# Patient Record
Sex: FEMALE | Race: WHITE | Employment: OTHER | URBAN - METROPOLITAN AREA
[De-identification: names, ages, dates, MRNs, and addresses within clinical notes are randomized per-mention and may not be internally consistent; named-entity substitution may affect disease eponyms.]

---

## 2017-01-18 RX ORDER — TOLTERODINE TARTRATE 2 MG/1
2 TABLET, EXTENDED RELEASE ORAL 2 TIMES DAILY
Qty: 180 TAB | Refills: 3 | Status: SHIPPED | OUTPATIENT
Start: 2017-01-18 | End: 2018-01-15 | Stop reason: SDUPTHER

## 2017-02-28 ENCOUNTER — OFFICE VISIT (OUTPATIENT)
Dept: CARDIOLOGY CLINIC | Age: 82
End: 2017-02-28

## 2017-02-28 ENCOUNTER — HOSPITAL ENCOUNTER (OUTPATIENT)
Dept: LAB | Age: 82
Discharge: HOME | End: 2017-02-28
Payer: MEDICARE

## 2017-02-28 VITALS
HEIGHT: 62 IN | OXYGEN SATURATION: 97 % | DIASTOLIC BLOOD PRESSURE: 70 MMHG | HEART RATE: 90 BPM | BODY MASS INDEX: 23 KG/M2 | WEIGHT: 125 LBS | SYSTOLIC BLOOD PRESSURE: 130 MMHG

## 2017-02-28 DIAGNOSIS — I25.10 ATHEROSCLEROSIS OF NATIVE CORONARY ARTERY OF NATIVE HEART WITHOUT ANGINA PECTORIS: Chronic | ICD-10-CM

## 2017-02-28 DIAGNOSIS — R00.2 PALPITATIONS: ICD-10-CM

## 2017-02-28 DIAGNOSIS — I35.0 MILD AORTIC STENOSIS: Chronic | ICD-10-CM

## 2017-02-28 DIAGNOSIS — I10 ESSENTIAL HYPERTENSION: Chronic | ICD-10-CM

## 2017-02-28 DIAGNOSIS — R07.9 CHEST PAIN, UNSPECIFIED TYPE: ICD-10-CM

## 2017-02-28 DIAGNOSIS — I48.0 PAF (PAROXYSMAL ATRIAL FIBRILLATION) (HCC): Primary | ICD-10-CM

## 2017-02-28 LAB
ANION GAP BLD CALC-SCNC: 5 MMOL/L (ref 3–18)
BASOPHILS # BLD AUTO: 0.1 K/UL (ref 0–0.1)
BASOPHILS # BLD: 1 % (ref 0–2)
BUN SERPL-MCNC: 36 MG/DL (ref 7–18)
BUN/CREAT SERPL: 44 (ref 12–20)
CALCIUM SERPL-MCNC: 8.9 MG/DL (ref 8.5–10.1)
CHLORIDE SERPL-SCNC: 107 MMOL/L (ref 100–108)
CO2 SERPL-SCNC: 29 MMOL/L (ref 21–32)
CREAT SERPL-MCNC: 0.81 MG/DL (ref 0.6–1.3)
DIFFERENTIAL METHOD BLD: ABNORMAL
EOSINOPHIL # BLD: 0.2 K/UL (ref 0–0.4)
EOSINOPHIL NFR BLD: 3 % (ref 0–5)
ERYTHROCYTE [DISTWIDTH] IN BLOOD BY AUTOMATED COUNT: 13.4 % (ref 11.6–14.5)
GLUCOSE SERPL-MCNC: 117 MG/DL (ref 74–99)
HCT VFR BLD AUTO: 34.5 % (ref 35–45)
HGB BLD-MCNC: 11.1 G/DL (ref 12–16)
LYMPHOCYTES # BLD AUTO: 25 % (ref 21–52)
LYMPHOCYTES # BLD: 1.4 K/UL (ref 0.9–3.6)
MCH RBC QN AUTO: 31.3 PG (ref 24–34)
MCHC RBC AUTO-ENTMCNC: 32.2 G/DL (ref 31–37)
MCV RBC AUTO: 97.2 FL (ref 74–97)
MONOCYTES # BLD: 0.5 K/UL (ref 0.05–1.2)
MONOCYTES NFR BLD AUTO: 9 % (ref 3–10)
NEUTS SEG # BLD: 3.4 K/UL (ref 1.8–8)
NEUTS SEG NFR BLD AUTO: 62 % (ref 40–73)
PLATELET # BLD AUTO: 167 K/UL (ref 135–420)
PMV BLD AUTO: 10.6 FL (ref 9.2–11.8)
POTASSIUM SERPL-SCNC: 4.3 MMOL/L (ref 3.5–5.5)
RBC # BLD AUTO: 3.55 M/UL (ref 4.2–5.3)
SODIUM SERPL-SCNC: 141 MMOL/L (ref 136–145)
WBC # BLD AUTO: 5.4 K/UL (ref 4.6–13.2)

## 2017-02-28 PROCEDURE — 36415 COLL VENOUS BLD VENIPUNCTURE: CPT | Performed by: INTERNAL MEDICINE

## 2017-02-28 PROCEDURE — 85025 COMPLETE CBC W/AUTO DIFF WBC: CPT | Performed by: INTERNAL MEDICINE

## 2017-02-28 PROCEDURE — 80048 BASIC METABOLIC PNL TOTAL CA: CPT | Performed by: INTERNAL MEDICINE

## 2017-02-28 NOTE — PROGRESS NOTES
HISTORY OF PRESENT ILLNESS  Rashida Dale is a 80 y.o. female. HPI  She came to the office with multiple issues. Approximately four weeks ago, she had sudden onset of chest tightness along with bilateral arm pain and jaw pain. However, her jaw pain and arm pain felt as if she had tingling rather than tightness. But, her chest felt very heavy. She was also short of breath. She has not had any recurrence of chest pain or jaw pain since then, but she has been short of breath with the slightest amount of physical activity. She has had no resting dyspnea, orthopnea or PND. She has been feeling palpitations lately and felt as if her heartbeat was wrong, but she could not pinpoint. She denies dizziness or syncope. She does have issues with balance and she walks with a cane. She has had no symptoms to indicate TIA or amaurosis fugax. She has a history of successful PTCA of the LAD in March 1995 and has had no recurrent angina ever since. She had a repeat cardiac catheterization in March 1995 for chest pain, which revealed no evidence of restenosis . Her noninvasive carotid studies on March 19, 2007, demonstrated 50% stenosis of the distal right internal carotid artery, which was not severe enough to warrant surgical intervention. She underwent stress nuclear cardiac imaging on May 11, 2009, which was a normal imaging, with no evidence of scarring or ischemia and no interval change in comparison to the study of March 2007.    Because of the continued chest pain despite the negative stress and EKG and cardiac imaging, she underwent the cardiac catheterization on 12/15/2010, which was reviewed.    1. Patient dominant RCA with the 30% of proximal stenosis. 2. Patent left main trunk. .  3. Patient LAD with the diffuse 20 to 30 % narrowing throughout. 4. Patient circumflex artery with diffuse 30% stenosis in the mid segment.   5. Normal left ventricular systolic function with EF in the 60% range.  6. Mild aortic stenosis with the pressure gradient of 10 to 20 mmHg on pullback. It was felt that her chest pain was noncardiac in nature, and the continued medical treatment was recommended.    She was admitted to [facility] on 12/4/11 with hemoptysis. She was evaluated by endoscopy examination and found to have a hiatal hernia but no source of bleeding. She was subsequently evaluated by pulmonology and was found to have obstructive lesion of left upper lobe. A bronchoscopy was done on 12/5/11 which demonstrated obstructive upper left lobe with hemorrhage of entire bronchial tree suggestive of hemoptysis rather than hematemesis from the GI system. 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 She has been under the care of Dr. Mariam Perez for hemoptysis and abnormal CT scan and chest X-ray but no decision has been made thus far. She underwent endoscopic examination on 10/21/13 and was found to have gastric ulcers, which were treated with Nexium. She is now just taking Prilosec occasionally. She has had stress nuclear cardiac imaging on 2/10/14, which demonstrated normal perfusion with no evidence of scarring or ischemia. The ejection fraction was in the 80% range. She underwent noninvasive carotid study to evaluate asymptomatic carotid bruit on 2/10/14, which demonstrated bilateral 1-49% stenosis of the internal carotid arteries, which was felt to be not severe enough to cause any symptoms or warrant treatment.    She continues to be followed by a pulmonologist for some mass like lesion in the lungs by chest X-ray and CT scan, but it has been negative for malignancy thus far. She has had occasional hemoptysis and it is not clear if it ever has been related to the lung lesions.  She is now under the care of Dr. Sylvia Cortes, as Dr. Mariam Perez has retired.    She underwent stress nuclear cardiac imaging on 02/22/16, which demonstrated a very small focal area of mild ischemia in the inferior apex. LV function was normal with EF in the 75% range. Review of Systems   Constitutional: Negative for malaise/fatigue and weight loss. HENT: Negative for hearing loss. Eyes: Negative for blurred vision and double vision. Respiratory: Positive for shortness of breath. Cardiovascular: Positive for chest pain and palpitations. Negative for orthopnea, claudication, leg swelling and PND. Gastrointestinal: Positive for heartburn. Negative for blood in stool and melena. Genitourinary: Negative for dysuria, frequency, hematuria and urgency. Musculoskeletal: Negative for back pain and joint pain. Skin: Negative for itching and rash. Neurological: Negative for dizziness, loss of consciousness, weakness and headaches. Psychiatric/Behavioral: Negative for depression and memory loss. Physical Exam   Constitutional: She is oriented to person, place, and time. She appears well-developed and well-nourished. HENT:   Head: Normocephalic and atraumatic. Eyes: Conjunctivae are normal. Pupils are equal, round, and reactive to light. Neck: Normal range of motion. Neck supple. No JVD present. Cardiovascular: Normal rate, S1 normal and S2 normal.  An irregularly irregular rhythm present. Extrasystoles are present. PMI is not displaced. Exam reveals no gallop and no friction rub. Murmur heard. Harsh early systolic murmur is present with a grade of 1/6  at the upper right sternal border radiating to the neck  Pulses:       Carotid pulses are 3+ on the right side with bruit, and 3+ on the left side with bruit. Pulmonary/Chest: Effort normal. She has no rales. Abdominal: Soft. There is no tenderness. Musculoskeletal: She exhibits no edema. Neurological: She is alert and oriented to person, place, and time. No cranial nerve deficit. Skin: Skin is warm and dry.    Psychiatric: She has a normal mood and affect. Her behavior is normal.     Visit Vitals    /70    Pulse 90    Ht 5' 2\" (1.575 m)    Wt 56.7 kg (125 lb)    SpO2 97%    BMI 22.86 kg/m2       Past Medical History:   Diagnosis Date    Allergic rhinitis     Anemia     Asymptomatic carotid bruit     asymptomatic    Cardiac cath 12/15/2010    pRCA 30%. LM patent. LAD 25%. CX 30%. LVEDP 10 mmHg. EF 60%. Mild AS.  Cardiac nuclear imaging test, low to mod risk 02/22/2016    Low to intermediate risk. Sm reversible inferoapical defect may be a sm area of ischemia. No prior infarction. EF >75%. Neg EKG on pharm stress test.    Carotid duplex 02/10/2014    Mild 1-49% bilateral ICA stenosis. 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 Marilyn Orlando Hypertension     Lung collapse Dec 2011    Collapse of left upper lobe    Mild aortic stenosis 8/22/2011    Osteoarthritis     Osteoporosis     Positive PPD        Social History     Social History    Marital status:      Spouse name: N/A    Number of children: N/A    Years of education: N/A     Occupational History    Not on file.      Social History Main Topics    Smoking status: Never Smoker    Smokeless tobacco: Never Used      Comment: extensive secondhand smoke exposure through job    Alcohol use No    Drug use: No    Sexual activity: No     Other Topics Concern    Not on file     Social History Narrative       Family History   Problem Relation Age of Onset    Heart Disease Mother      History of CHF    Heart Disease Father     Heart Attack Father     Heart Disease Sister     Cancer Brother      non-Hodgkin lymphoma    Cancer Brother      liver cancer    Heart Disease Brother     Stroke Brother     Parkinsonism Sister     Parkinsonism Brother        Past Surgical History:   Procedure Laterality Date    HX CATARACT REMOVAL      HX COLONOSCOPY      HX HEART CATHETERIZATION  12/15/10    negative    HX HYSTERECTOMY      HX KNEE ARTHROSCOPY      HX PTCA  3/1995    of the LAD; has had no recurrent angina since    HX TONSILLECTOMY      OH BRONCHOSCOPY BRONCHIAL/ENDOBRNCL BX 1+ SITES  9/25/2012            Current Outpatient Prescriptions   Medication Sig Dispense Refill    PSYLLIUM SEED, WITH DEXTROSE, (FIBER PO) Take  by mouth.  tolterodine (DETROL) 2 mg tablet Take 1 Tab by mouth two (2) times a day. 180 Tab 3    carvedilol (COREG) 12.5 mg tablet take 1 tablet by mouth twice a day 180 Tab 3    lovastatin (MEVACOR) 20 mg tablet Take 1 Tab by mouth daily. 90 Tab 3    trimethoprim-sulfamethoxazole (BACTRIM DS) 160-800 mg per tablet Take 1 Tab by mouth two (2) times a day. 14 Tab 0    amLODIPine (NORVASC) 10 mg tablet take 1 tablet by mouth daily 30 Tab 6    losartan (COZAAR) 50 mg tablet Take 1 Tab by mouth daily. 30 Tab 6    hydrochlorothiazide (MICROZIDE) 12.5 mg capsule Take 1 Cap by mouth daily. 90 Cap 3    docusate sodium (COLACE) 100 mg capsule One capsule by mouth twice daily 60 Cap 3    omeprazole (PRILOSEC) 20 mg capsule Take 20 mg by mouth daily as needed.  naproxen sodium (ALEVE) 220 mg cap Take  by mouth.  lactobacillus acidophilus (BACID) cap Take 2 capsules by mouth two (2) times a day.  ascorbic acid (VITAMIN C) 250 mg tablet Take 1 Tab by mouth two (2) times daily (with meals).  [Request refills from PCP ( Barry Chapman)] 30 Tab 0    cholecalciferol (VITAMIN D3) 1,000 unit tablet Take 2 Tabs by mouth daily. [Request refills from PCP (Dr. Aidan Chapman)] 30 Tab 0    aspirin 81 mg tablet Take 81 mg by mouth daily.  MULTIVITAMIN PO Take 1 Tab by mouth daily.  DOCOSAHEXANOIC ACID/EPA (FISH OIL PO) Take 3 Tabs by mouth daily. EKG: atrial fibrillation, rate controlled, occasional PVC noted, unifocal, AF new since 8/29/16  . ASSESSMENT and PLAN  Encounter Diagnoses   Name Primary?  PAF (paroxysmal atrial fibrillation) ([facility]) Yes    Coronary artery disease, status post PTCA of LAD in March 1995/EF 70%.  Mild aortic stenosis     Essential hypertension     Palpitations     Chest pain, unspecified type    Her chest pain was very suggestive of angina with heaviness in the center of the chest, jaw pain and bilateral arm pain, but was atypical in that it was nonexertional and has not been recurrent in the past four weeks. The  duration was twenty to twenty-five minutes that is atypical for angina. However, she has been experiencing dyspnea on exertion. She has developed new onset atrial fibrillation with some palpitations. Her dyspnea on exertion could be secondary to left ventricular diastolic dysfunction with superimposed atrial fibrillation. We discussed the atrial fibrillation and she was advised that atrial fibrillation is not a life threatening arrhythmia, but there are issues with heart rate control and stroke prevention. I would obtain a Holter monitor to determine the heart rate control. She is somewhat concerned about the anticoagulation because of her advanced age and balance issues, which I agreed with. I would like to evaluate her coronary artery status thoroughly before considering anticoagulation in case she would require invasive treatment.

## 2017-02-28 NOTE — MR AVS SNAPSHOT
Visit Information Date & Time Provider Department Dept. Phone Encounter #  
 2/28/2017 10:40 AM Dylan Painting MD [facility] [phone] [id number] Your Appointments 4/25/2017  9:25 AM  
LAB with IOC NURSE VISIT Internist of [facility] ([address]) Appt Note: OV lab  
 [address], [address] [facility] [address] [place]  
  
   
 [address], [address]  
  
    
 5/2/2017 10:00 AM  
Office Visit with Janey Cortez MD  
Internist of [address] [place] [address] [address] [facility]) Appt Note: 4 mth f/u  
 [address] [facility], [address] [facility] [address] [place]  
  
   
 [address], [address] Upcoming Health Maintenance Date Due DTaP/Tdap/Td series (1 - Tdap) 12/29/1949 Pneumococcal 65+ High/Highest Risk (2 of 2 - PPSV23) 6/16/2016 GLAUCOMA SCREENING Q2Y 1/5/2017 MEDICARE YEARLY EXAM 4/22/2017 Allergies as of 2/28/2017  Review Complete On: 2/28/2017 By: Dylan Painting MD  
  
 Severity Noted Reaction Type Reactions Bactrim [Sulfamethoprim Ds] High 02/11/2013   Systemic Nausea and Vomiting Vicodin [Hydrocodone-acetaminophen] Medium 10/03/2012   Side Effect Nausea Only Atenolol    Other (comments)  
 interolance Codeine    Other (comments)  
 headache Erythromycin    Unknown (comments) Other Medication    Not Reported This Time Refined sugar Pcn [Penicillins]    Hives Current Immunizations  Reviewed on 8/26/2016 Name Date Influenza High Dose Vaccine PF 8/26/2016 10:11 AM, 10/12/2015 11:23 AM, 9/10/2014  3:33 PM  
 Influenza Vaccine 10/24/2013 Influenza Vaccine Split 10/28/2011 Influenza Vaccine Whole 10/25/2010 Pneumococcal Conjugate (PCV-13) 4/21/2016 10:28 AM  
 TB Skin Test (PPD) 1/1/1992 Not reviewed this visit You Were Diagnosed With   
  
 Codes Comments PAF (paroxysmal atrial fibrillation) ([facility]    -  Primary ICD-10-CM: I48.0 ICD-9-CM: 427.31 Atherosclerosis of native coronary artery of native heart without angina pectoris     ICD-10-CM: I25.10 ICD-9-CM: 414.01 Mild aortic stenosis     ICD-10-CM: I35.0 ICD-9-CM: 424.1 Essential hypertension     ICD-10-CM: I10 
ICD-9-CM: 401.9 Palpitations     ICD-10-CM: R00.2 ICD-9-CM: 785.1 Chest pain, unspecified type     ICD-10-CM: R07.9 ICD-9-CM: 786.50 Vitals BP  
  
  
  
  
  
 130/70 Vitals History BMI and BSA Data Body Mass Index Body Surface Area  
 22.86 kg/m 2 1.57 m 2 Preferred Pharmacy Pharmacy Name Phone [facility] [address] [facility], [address] [place] [address] [phone] Your Updated Medication List  
  
   
This list is accurate as of: 2/28/17 11:59 AM.  Always use your most recent med list.  
  
  
  
  
 ALEVE 220 mg Cap Generic drug:  naproxen sodium Take  by mouth. amLODIPine 10 mg tablet Commonly known as:  NORVASC  
take 1 tablet by mouth daily  
  
 ascorbic acid (vitamin C) 250 mg tablet Commonly known as:  VITAMIN C Take 1 Tab by mouth two (2) times daily (with meals). [Request refills from PCP (Dr. Katrin Chapman)] aspirin 81 mg tablet Take 81 mg by mouth daily. carvedilol 12.5 mg tablet Commonly known as:  COREG  
take 1 tablet by mouth twice a day  
  
 cholecalciferol 1,000 unit tablet Commonly known as:  VITAMIN D3 Take 2 Tabs by mouth daily. [Request refills from PCP (Dr. Katrin Chapman)] docusate sodium 100 mg capsule Commonly known as:  Lucetta Oviedo One capsule by mouth twice daily FIBER PO Take  by mouth. FISH OIL PO Take 3 Tabs by mouth daily. hydroCHLOROthiazide 12.5 mg capsule Commonly known as:  Dalton Bue Take 1 Cap by mouth daily. Lactobacillus acidophilus Cap Commonly known as:  BACID  
 Take 2 capsules by mouth two (2) times a day. losartan 50 mg tablet Commonly known as:  COZAAR Take 1 Tab by mouth daily. lovastatin 20 mg tablet Commonly known as:  MEVACOR Take 1 Tab by mouth daily. MULTIVITAMIN PO Take 1 Tab by mouth daily. PriLOSEC 20 mg capsule Generic drug:  omeprazole Take 20 mg by mouth daily as needed. tolterodine 2 mg tablet Commonly known as:  Yared Knuckles Take 1 Tab by mouth two (2) times a day. trimethoprim-sulfamethoxazole 160-800 mg per tablet Commonly known as:  BACTRIM DS Take 1 Tab by mouth two (2) times a day. We Performed the Following AMB POC EKG ROUTINE W/ 12 LEADS, INTER & REP [22221 CPT(R)] To-Do List   
 02/28/2017 ECG:  CARDIAC SPECT REST AND STRESS   
  
 02/28/2017 Lab:  CBC WITH AUTOMATED DIFF   
  
 02/28/2017 ECG:  ECG HOLTER MONITOR, UP TO 48 HRS   
  
 02/28/2017 Lab:  METABOLIC PANEL, BASIC   
  
 02/28/2017 ECG:  NUCLEAR STRESS TEST   
  
 03/02/2017 9:15 AM  
  Appointment with [address] [place] [address] at [facility] NON-INVASIVE [address] [place] [address] ([phone]) This is a 2-part test which takes approximately 4 hours to complete. Please see part 2 of exam below for full instructions 03/02/2017 9:45 AM  
  Appointment with [facility] NUC CARD/TREADMILL ROOM at [facility] NON-INVASIVE CARD ([phone]) 1-No eating or coffee after midnight  2-Do not take diabetic meds (bring with) 3-Please take all other meds unless specified by cardiology 03/02/2017 1:00 PM  
  Appointment with Sarthak Gilman at [facility] NON-INVASIVE [address] [place] [address] ([phone]) Age Limit for ALL Heart procedures @ [address] is 18 yrs and older only. Under the age of 25, refer to [facility] ([phone]). This study requires a physician's written prescription. Patients may bring the order or it may also be faxed to Central Scheduling at [phone]. Please wear a shirt with buttons down the front. Bring list of medications.   Do not have a bath/shower during your 24 hour recording. Please remind patient they will need to return 24 hours, 48 hours, or 72 hours after monitor is in place to have it removed by the technician.  (example: If patient is scheduled for a 24 hour holter, return 24 hours after monitor is in place to have it removed. If patient is scheduled for a 48 hour holter monitor, then they would return 48 hours after holter is in place, etc.)  Staff is available until 2:30 p.m. for equipment removal/returns due on Saturday. IMPORTANT:  Once patient has been fitted with a HOLTER MONITOR, they should not have any other exams performed until the Holter has been removed. Introducing [facility]! [facility] introduces [employer] patient portal. Now you can access parts of your medical record, email your doctor's office, and request medication refills online. 1. In your internet browser, go to [email or web address] 2. Click on the First Time User? Click Here link in the Sign In box. You will see the New Member Sign Up page. 3. Enter your [employer] Access Code exactly as it appears below. You will not need to use this code after youve completed the sign-up process. If you do not sign up before the expiration date, you must request a new code. · [employer] Access Code: [email or web address] Expires: 3/5/2017 10:29 AM 
 
4. Enter the last four digits of your Social Security Number (xxxx) and Date of Birth (mm/dd/yyyy) as indicated and click Submit. You will be taken to the next sign-up page. 5. Create a [employer] ID. This will be your [employer] login ID and cannot be changed, so think of one that is secure and easy to remember. 6. Create a [employer] password. You can change your password at any time. 7. Enter your Password Reset Question and Answer. This can be used at a later time if you forget your password. 8. Enter your e-mail address.  You will receive e-mail notification when new information is available in [employer]. 9. Click Sign Up. You can now view and download portions of your medical record. 10. Click the Download Summary menu link to download a portable copy of your medical information. If you have questions, please visit the Frequently Asked Questions section of the [employer] website. Remember, [employer] is NOT to be used for urgent needs. For medical emergencies, dial 911. Now available from your iPhone and Android! Please provide this summary of care documentation to your next provider. Your primary care clinician is listed as Laureen Price. If you have any questions after today's visit, please call [phone].

## 2017-02-28 NOTE — PROGRESS NOTES
1. Have you been to the ER, urgent care clinic since your last visit? Hospitalized since your last visit? no  2. Have you seen or consulted any other health care providers outside of the [address] [place] [address] since your last visit? Include any pap smears or colon screening.  no

## 2017-03-02 ENCOUNTER — HOSPITAL ENCOUNTER (OUTPATIENT)
Dept: NON INVASIVE DIAGNOSTICS | Age: 82
Discharge: HOME | End: 2017-03-02
Attending: INTERNAL MEDICINE
Payer: MEDICARE

## 2017-03-02 DIAGNOSIS — I25.10 ATHEROSCLEROSIS OF NATIVE CORONARY ARTERY OF NATIVE HEART WITHOUT ANGINA PECTORIS: Chronic | ICD-10-CM

## 2017-03-02 DIAGNOSIS — R07.9 CHEST PAIN, UNSPECIFIED TYPE: ICD-10-CM

## 2017-03-02 DIAGNOSIS — I48.0 PAF (PAROXYSMAL ATRIAL FIBRILLATION) (HCC): ICD-10-CM

## 2017-03-02 PROCEDURE — 93017 CV STRESS TEST TRACING ONLY: CPT | Performed by: INTERNAL MEDICINE

## 2017-03-02 PROCEDURE — 93225 XTRNL ECG REC<48 HRS REC: CPT | Performed by: INTERNAL MEDICINE

## 2017-03-02 PROCEDURE — A9500 TC99M SESTAMIBI: HCPCS

## 2017-03-02 PROCEDURE — 78452 HT MUSCLE IMAGE SPECT MULT: CPT | Performed by: INTERNAL MEDICINE

## 2017-03-02 PROCEDURE — 74011250636 HC RX REV CODE- 250/636: Performed by: INTERNAL MEDICINE

## 2017-03-02 RX ORDER — SODIUM CHLORIDE 9 MG/ML
250 INJECTION, SOLUTION INTRAVENOUS ONCE
Status: COMPLETED | OUTPATIENT
Start: 2017-03-02 | End: 2017-03-02

## 2017-03-02 RX ADMIN — SODIUM CHLORIDE 250 ML/HR: 900 INJECTION, SOLUTION INTRAVENOUS at 10:40

## 2017-03-02 RX ADMIN — REGADENOSON 0.4 MG: 0.08 INJECTION, SOLUTION INTRAVENOUS at 10:45

## 2017-03-02 NOTE — PROCEDURES
[address] [facility] CARDIAC STRESS    Name:  Katey Holguin  MR#:  [id number]  :  [id number]  Account #:  [de-identified]  Date of Adm:  2017  Date of Service:  2017      PHARMACOLOGIC NUCLEAR SCAN RESULTS    PROCEDURES PERFORMED: Pharmacologic nuclear scan. Baseline electrocardiogram shows atrial fibrillation with nonspecific ST  changes. The patient has an IV in the right arm. She received Lexiscan per  protocol. She tolerated the procedure well. No chest pain was noted. She received resting dose of sestamibi 10.73 mCi at 9:15 a.m. She received stress dose of sestamibi 33.0 mCi at 10:45 a.m. TREADMILL FINDINGS:  1. ST segment changes: Less than 0.5 mm upsloping ST depression  inferolaterally at peak infusion. 2. Dysrhythmia: Occasional PVCs. 3. Chest pain: None. 4. Both heart rate and blood pressure response are normal.    TREADMILL CONCLUSION: This is a negative pharmacologic stress  test from an electrocardiographic standpoint. MYOCARDIAL NUCLEAR PERFUSION STUDY FINDINGS:  1. Perfusion imaging shows no evidence of significant ongoing  ischemia or prior infarction. 2. Gated SPECT imaging shows small left ventricular chamber size  and hyperdynamic LV function with estimated ejection fraction greater  than 70%. No obvious regional wall motion abnormalities noted. CONCLUSIONS:  1. Negative pharmacologic stress test from an electrocardiographic  standpoint. 2. Normal perfusion study. 3. Perfusion imaging shows no evidence of significant ongoing  ischemia or prior infarction. 4. Gated SPECT imaging shows small left ventricular chamber size  and hyperdynamic LV function with estimated ejection fraction greater  than 70%. No obvious regional wall motion abnormalities are noted. 5. In comparison to prior study of 2016, no significant changes  are noted. 6. This is a low risk finding.         MD KARIS Camilo / Fadi  D:  2017   11:45  T: 03/02/2017   15:34  Job #:  [id number]

## 2017-03-02 NOTE — PROGRESS NOTES
Patient was given 10.74 mCi of Sestamibi for the Resting pictures. Patient received 0.4 mg of Lexiscan for the exercise portion of the Stress test. Patient was then given 33 mCi of Sestamibi for the Stress pictures. Armband was removed and disposed of before the patient left.

## 2017-03-03 LAB
ATTENDING PHYSICIAN, CST07: NORMAL
DIAGNOSIS, 93000: NORMAL
DUKE TM SCORE RESULT, CST14: NORMAL
DUKE TREADMILL SCORE, CST13: NORMAL
ECG INTERP BEFORE EX, CST11: NORMAL
ECG INTERP DURING EX, CST12: NORMAL
FUNCTIONAL CAPACITY, CST17: NORMAL
KNOWN CARDIAC CONDITION, CST08: NORMAL
MAX. DIASTOLIC BP, CST04: 87 MMHG
MAX. HEART RATE, CST05: 127 BPM
MAX. SYSTOLIC BP, CST03: 143 MMHG
OVERALL BP RESPONSE TO EXERCISE, CST16: NORMAL
OVERALL HR RESPONSE TO EXERCISE, CST15: NORMAL
PEAK EX METS, CST10: 1 METS
PROTOCOL NAME, CST01: NORMAL
TEST INDICATION, CST09: NORMAL

## 2017-03-17 RX ORDER — AMLODIPINE BESYLATE 10 MG/1
TABLET ORAL
Qty: 30 TAB | Refills: 6 | Status: SHIPPED | OUTPATIENT
Start: 2017-03-17 | End: 2017-10-10 | Stop reason: SDUPTHER

## 2017-03-30 RX ORDER — LOSARTAN POTASSIUM 50 MG/1
50 TABLET ORAL DAILY
Qty: 30 TAB | Refills: 6 | Status: SHIPPED | OUTPATIENT
Start: 2017-03-30 | End: 2017-11-02 | Stop reason: SDUPTHER

## 2017-04-18 ENCOUNTER — OFFICE VISIT (OUTPATIENT)
Dept: CARDIOLOGY CLINIC | Age: 82
End: 2017-04-18

## 2017-04-18 VITALS
DIASTOLIC BLOOD PRESSURE: 72 MMHG | WEIGHT: 126 LBS | HEART RATE: 82 BPM | BODY MASS INDEX: 23.19 KG/M2 | HEIGHT: 62 IN | SYSTOLIC BLOOD PRESSURE: 130 MMHG | OXYGEN SATURATION: 97 %

## 2017-04-18 DIAGNOSIS — I25.10 ATHEROSCLEROSIS OF NATIVE CORONARY ARTERY OF NATIVE HEART WITHOUT ANGINA PECTORIS: Chronic | ICD-10-CM

## 2017-04-18 DIAGNOSIS — I10 ESSENTIAL HYPERTENSION: Chronic | ICD-10-CM

## 2017-04-18 DIAGNOSIS — I35.0 MILD AORTIC STENOSIS: Chronic | ICD-10-CM

## 2017-04-18 DIAGNOSIS — R07.9 CHEST PAIN, UNSPECIFIED TYPE: ICD-10-CM

## 2017-04-18 DIAGNOSIS — I48.20 CHRONIC ATRIAL FIBRILLATION (HCC): Primary | ICD-10-CM

## 2017-04-18 NOTE — PROGRESS NOTES
HISTORY OF PRESENT ILLNESS  Neri Garg is a 80 y.o. female. HPI  She has been feeling better in general.  She is no longer experiencing chest tightness. She continues with the mild dyspnea on exertion. She has had occasional fluttering in the chest, but no prolonged palpitations. She has had dizziness but more vertigo-type. She has had no syncope. She has had no symptoms to indicate TIA or amaurosis fugax. For the evaluation of her chest pain, she underwent stress nuclear cardiac imaging on 03/02/2017, which demonstrated normal perfusion with no evidence of scarring or ischemia. Ejection fraction was greater than 70%. She underwent a Holter monitor on 03/06/2017 to evaluate the heart rate response, which demonstrated baseline atrial fibrillation with a minimum heart rate of 58 and a maximum heart rate of 141 with an average heart rate of 88. There were no significant pauses. Her BUN/creatinine were 36 and 0.81 on 02/28/2017. She has a history of successful PTCA of the LAD in March 1995 and has had no recurrent angina ever since. She had a repeat cardiac catheterization in March 1995 for chest pain, which revealed no evidence of restenosis . Her noninvasive carotid studies on March 19, 2007, demonstrated 50% stenosis of the distal right internal carotid artery, which was not severe enough to warrant surgical intervention. She underwent stress nuclear cardiac imaging on May 11, 2009, which was a normal imaging, with no evidence of scarring or ischemia and no interval change in comparison to the study of March 2007.    Because of the continued chest pain despite the negative stress and EKG and cardiac imaging, she underwent the cardiac catheterization on 12/15/2010, which was reviewed.    1. Patient dominant RCA with the 30% of proximal stenosis. 2. Patent left main trunk. .  3. Patient LAD with the diffuse 20 to 30 % narrowing throughout.   4. Patient circumflex artery with diffuse 30% stenosis in the mid segment. 5. Normal left ventricular systolic function with EF in the 60% range. 6. Mild aortic stenosis with the pressure gradient of 10 to 20 mmHg on pullback. It was felt that her chest pain was noncardiac in nature, and the continued medical treatment was recommended.    [facility]. She was evaluated by endoscopy examination and found to have a hiatal hernia but no source of bleeding. She was subsequently evaluated by pulmonology and was found to have obstructive lesion of left upper lobe. A bronchoscopy was done on 12/5/11 which demonstrated obstructive upper left lobe with hemorrhage of entire bronchial tree suggestive of hemoptysis rather than hematemesis from the GI system. 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 She has been under the care of Dr. Chandler Avelar for hemoptysis and abnormal CT scan and chest X-ray but no decision has been made thus far. She underwent endoscopic examination on 10/21/13 and was found to have gastric ulcers, which were treated with Nexium. She is now just taking Prilosec occasionally. She has had stress nuclear cardiac imaging on 2/10/14, which demonstrated normal perfusion with no evidence of scarring or ischemia. The ejection fraction was in the 80% range. She underwent noninvasive carotid study to evaluate asymptomatic carotid bruit on 2/10/14, which demonstrated bilateral 1-49% stenosis of the internal carotid arteries, which was felt to be not severe enough to cause any symptoms or warrant treatment.    She continues to be followed by a pulmonologist for some mass like lesion in the lungs by chest X-ray and CT scan, but it has been negative for malignancy thus far. She has had occasional hemoptysis and it is not clear if it ever has been related to the lung lesions.  She is now under the care of Dr. Concepción Esparza, as Dr. Sara Christiansen has retired.    She underwent stress nuclear cardiac imaging on 02/22/16, which demonstrated a very small focal area of mild ischemia in the inferior apex. LV function was normal with EF in the 75% range. Review of Systems   Constitutional: Negative for malaise/fatigue and weight loss. HENT: Negative for hearing loss. Eyes: Negative for blurred vision and double vision. Respiratory: Positive for shortness of breath. Cardiovascular: Positive for palpitations. Negative for chest pain, orthopnea, claudication, leg swelling and PND. Gastrointestinal: Negative for blood in stool, heartburn and melena. Genitourinary: Negative for dysuria, frequency, hematuria and urgency. Musculoskeletal: Negative for back pain and joint pain. Skin: Negative for itching and rash. Neurological: Negative for dizziness, loss of consciousness, weakness and headaches. Psychiatric/Behavioral: Negative for depression and memory loss. Physical Exam   Constitutional: She is oriented to person, place, and time. She appears well-developed and well-nourished. HENT:   Head: Normocephalic and atraumatic. Eyes: Conjunctivae are normal. Pupils are equal, round, and reactive to light. Neck: Normal range of motion. Neck supple. No JVD present. Cardiovascular: Normal rate, S1 normal and S2 normal.  An irregularly irregular rhythm present. No extrasystoles are present. PMI is not displaced. Exam reveals no gallop and no friction rub. Murmur heard. Harsh early systolic murmur is present with a grade of 1/6  at the upper right sternal border radiating to the neck  Pulses:       Carotid pulses are 3+ on the right side with bruit, and 3+ on the left side with bruit. Pulmonary/Chest: Effort normal. She has no rales. Abdominal: Soft. There is no tenderness. Musculoskeletal: She exhibits no edema. Neurological: She is alert and oriented to person, place, and time.  No cranial nerve deficit. Skin: Skin is warm and dry. Psychiatric: She has a normal mood and affect. Her behavior is normal.     Visit Vitals    /72    Pulse 82    Ht 5' 2\" (1.575 m)    Wt 57.2 kg (126 lb)    SpO2 97%    BMI 23.05 kg/m2       Past Medical History:   Diagnosis Date    Allergic rhinitis     Anemia     Asymptomatic carotid bruit     asymptomatic    Cardiac cath 12/15/2010    pRCA 30%. LM patent. LAD 25%. CX 30%. LVEDP 10 mmHg. EF 60%. Mild AS.  Cardiac nuclear imaging test, low to mod risk 02/22/2016    Low to intermediate risk. Sm reversible inferoapical defect may be a sm area of ischemia. No prior infarction. EF >75%. Neg EKG on pharm stress test.    Carotid duplex 02/10/2014    Mild 1-49% bilateral ICA stenosis. 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 Matthew Mcintyre Hypertension     Lung collapse Dec 2011    Collapse of left upper lobe    Mild aortic stenosis 8/22/2011    Osteoarthritis     Osteoporosis     Positive PPD        Social History     Social History    Marital status:      Spouse name: N/A    Number of children: N/A    Years of education: N/A     Occupational History    Not on file.      Social History Main Topics    Smoking status: Never Smoker    Smokeless tobacco: Never Used      Comment: extensive secondhand smoke exposure through job    Alcohol use No    Drug use: No    Sexual activity: No     Other Topics Concern    Not on file     Social History Narrative       Family History   Problem Relation Age of Onset    Heart Disease Mother      History of CHF    Heart Disease Father     Heart Attack Father     Heart Disease Sister     Cancer Brother      non-Hodgkin lymphoma    Cancer Brother      liver cancer    Heart Disease Brother     Stroke Brother     Parkinsonism Sister     Parkinsonism Brother        Past Surgical History:   Procedure Laterality Date    HX CATARACT REMOVAL      HX COLONOSCOPY      HX HEART CATHETERIZATION  12/15/10    negative    HX HYSTERECTOMY      HX KNEE ARTHROSCOPY      HX PTCA  3/1995    of the LAD; has had no recurrent angina since    HX TONSILLECTOMY      NC BRONCHOSCOPY BRONCHIAL/ENDOBRNCL BX 1+ SITES  9/25/2012            Current Outpatient Prescriptions   Medication Sig Dispense Refill    losartan (COZAAR) 50 mg tablet Take 1 Tab by mouth daily. 30 Tab 6    amLODIPine (NORVASC) 10 mg tablet take 1 tablet by mouth daily 30 Tab 6    PSYLLIUM SEED, WITH DEXTROSE, (FIBER PO) Take  by mouth.  tolterodine (DETROL) 2 mg tablet Take 1 Tab by mouth two (2) times a day. 180 Tab 3    carvedilol (COREG) 12.5 mg tablet take 1 tablet by mouth twice a day 180 Tab 3    lovastatin (MEVACOR) 20 mg tablet Take 1 Tab by mouth daily. 90 Tab 3    hydrochlorothiazide (MICROZIDE) 12.5 mg capsule Take 1 Cap by mouth daily. 90 Cap 3    docusate sodium (COLACE) 100 mg capsule One capsule by mouth twice daily 60 Cap 3    omeprazole (PRILOSEC) 20 mg capsule Take 20 mg by mouth daily as needed.  naproxen sodium (ALEVE) 220 mg cap Take  by mouth.  lactobacillus acidophilus (BACID) cap Take 2 capsules by mouth two (2) times a day.  ascorbic acid (VITAMIN C) 250 mg tablet Take 1 Tab by mouth two (2) times daily (with meals).  [Request refills from PCP (Dr. Lis Chapman)] 30 Tab 0    cholecalciferol (VITAMIN D3) 1,000 unit tablet Take 2 Tabs by mouth daily. [Request refills from PCP ( [facility])] 30 Tab 0    aspirin 81 mg tablet Take 81 mg by mouth daily.  MULTIVITAMIN PO Take 1 Tab by mouth daily.  DOCOSAHEXANOIC ACID/EPA (FISH OIL PO) Take 3 Tabs by mouth daily. EKG: unchanged from previous tracings, atrial fibrillation, rate controlled, occasional PVC noted, unifocal.    ASSESSMENT and PLAN  Encounter Diagnoses   Name Primary?  Chronic atrial fibrillation (HCC) Yes    Coronary artery disease, status post PTCA of LAD in March 1995/EF 70%.  Mild aortic stenosis     Essential hypertension     Chest pain, unspecified type    She has been feeling better. She is no longer experiencing chest pain and her recent stress test demonstrated normal perfusion with no evidence of ischemia and normal left ventricle. She continues with atrial fibrillation of which the rate seems to be adequately controlled. She has had only mild palpitations with no prolonged palpitations. Her heart rate seems to have been under control. We have discussed at length regarding the pros and cons of anticoagulation. The patient feels that she is confident enough to handle the anticoagulation. She feels that she is quite steady, although, she walks with a cane. We discussed Coumadin vs. novel anticoagulation. We agreed upon initiating anticoagulation with Xarelto 15 mg daily. I chose Xarelto because of her advanced age with the potential for a renal dysfunction issue in the future. I spent forty minutes with the patient in face-to-face consultation of which greater than fifty percent was spent in counseling and discussion.

## 2017-04-18 NOTE — PATIENT INSTRUCTIONS
Start Xarelto 15mg one tab dailyRivaroxaban (By mouth)   Rivaroxaban ([id number]-a-ban)  Treats and prevents blood clots, which lowers the risk of stroke, deep vein thrombosis (DVT), pulmonary embolism (PE), and similar conditions. This medicine is a blood thinner. Brand Name(s):Xarelto, Xarelto Starter Pack   There may be other brand names for this medicine. When This Medicine Should Not Be Used: This medicine is not right for everyone. Do not use it if you had an allergic reaction to rivaroxaban, or you have severe bleeding. How to Use This Medicine:   Tablet  · Take this medicine as directed, and take it at the same time each day. · 10-milligram (mg) tablet: Take with or without food. · 15-mg or 20-mg tablet: Take with food. · If you cannot swallow the tablets, you may crush the tablet and mix it with applesauce. Eat some food after you swallow the mixture. · Tube feeding: You may crush the tablet and mix the medicine in 50 milliliters (mL) of water before giving it via the tube. This must be followed by a feeding. · This medicine should come with a Medication Guide. Ask your pharmacist for a copy if you do not have one. · Missed dose:   ¨ Ask your doctor or pharmacist if you are not sure what to do if you miss a dose. ¨ Once-daily dose: If you miss a dose or forget to use your medicine, use it as soon as you can on the same day. Do not use extra medicine to make up for a missed dose. ¨ Twice-daily dose to treat a blood clot (15-mg tablet): If you miss a dose or forget to use your medicine, use it as soon as you can on the same day. You may take 2 doses at the same time to make up for the missed dose. This is only for people who take a total of 30 mg per day. · Store the medicine in a closed container at room temperature, away from heat, moisture, and direct light.   Drugs and Foods to Avoid:   Ask your doctor or pharmacist before using any other medicine, including over-the-counter medicines, vitamins, and herbal products. · Some foods and medicines can affect how rivaroxaban works. Tell your doctor if you are using any of the following:  ¨ NSAID medicine (including aspirin, celecoxib, diclofenac, ibuprofen, naproxen)  ¨ Ketoconazole, itraconazole, lopinavir, ritonavir, indinavir, conivaptan, carbamazepine, phenytoin, rifampin, Alannah's wort  ¨ Another blood thinner (including clopidogrel, enoxaparin, heparin, warfarin)  Warnings While Using This Medicine:   · Tell your doctor if you are pregnant or breastfeeding, or if you have kidney disease, liver disease, bleeding problems, or an artificial heart valve. · This medicine may increase your risk of bleeding. Be careful to avoid injuries that could cause bleeding. Stay away from rough sports or other situations where you could be bruised, cut, or hurt. Brush and floss your teeth gently. Be careful when using sharp objects, including razors and fingernail clippers. Avoid picking your nose. If you need to blow your nose, blow it gently. · This medicine may cause nerve damage if you have a medical procedure done to your back, including anesthesia or a spinal puncture. This is more likely to happen if you have a history of back injury, back surgery, problems with your spine, or procedures or punctures to your back. Tell your doctor if you are also taking another blood thinner, because this also increases the risk. · Do not stop using this medicine suddenly without asking your doctor. You might have a higher risk of stroke for a short time after you stop using this medicine. · Tell any doctor or dentist who treats you that you are using this medicine. · Your doctor will do lab tests at regular visits to check on the effects of this medicine. Keep all appointments. · Keep all medicine out of the reach of children. Never share your medicine with anyone.   Possible Side Effects While Using This Medicine:   Call your doctor right away if you notice any of these side effects:  · Allergic reaction: Itching or hives, swelling in your face or hands, swelling or tingling in your mouth or throat, chest tightness, trouble breathing  · Blistering, peeling, or red skin rash  · Decrease in how much or how often you urinate  · Heavy menstrual bleeding, or vaginal bleeding  · Red or brown urine, bloody or black, tarry stools  · Unusual bleeding or bruising, including frequent nosebleeds  · Vomiting blood or material that looks like coffee grounds  If you notice other side effects that you think are caused by this medicine, tell your doctor. Call your doctor for medical advice about side effects. You may report side effects to FDA at 0-729-WEP-5328  © 2016 [employer] is for End User's use only and may not be sold, redistributed or otherwise used for commercial purposes. The above information is an  only. It is not intended as medical advice for individual conditions or treatments. Talk to your doctor, nurse or pharmacist before following any medical regimen to see if it is safe and effective for you.

## 2017-04-18 NOTE — MR AVS SNAPSHOT
Visit Information Date & Time Provider Department Dept. Phone Encounter #  
 4/18/2017  9:40 AM Narciso Pereira MD [facility] [address] [id number] Your Appointments 4/25/2017 11:05 AM  
LAB with [facility] NURSE VISIT Internist of [facility] ([facility] Appt Note: OV lab; pt called to confirm appt 02/16/17; pt r/s from 04/25/2017  
 [address], [address] [facility] [address] [place]  
  
   
 [address], [address]  
  
    
 5/2/2017 10:00 AM  
Office Visit with Abiel Ibarra MD  
Intern[facility] of [address] [place] [address] [facility]: [facility] mth f/u  
 [address] [facility], [address] [id number] [address] [place]  
  
   
 [address], [address] Upcoming Health Maintenance Date Due DTaP/Tdap/Td series (1 - Tdap) 12/29/1949 GLAUCOMA SCREENING Q2Y 1/5/2017 Pneumococcal 65+ Low/Medium Risk (2 of 2 - PPSV23) 4/21/2017 MEDICARE YEARLY EXAM 4/22/2017 Allergies as of 4/18/2017  Review Complete On: 4/18/2017 By: Narciso Pereira MD  
  
 Severity Noted Reaction Type Reactions Bactrim [Sulfamethoprim Ds] High 02/11/2013   Systemic Nausea and Vomiting Vicodin [Hydrocodone-acetaminophen] Medium 10/03/2012   Side Effect Nausea Only Atenolol    Other (comments)  
 interolance Codeine    Other (comments)  
 headache Erythromycin    Unknown (comments) Other Medication    Not Reported This Time Refined sugar Pcn [Penicillins]    Hives Current Immunizations  Reviewed on 8/26/2016 Name Date Influenza High Dose Vaccine PF 8/26/2016 10:11 AM, 10/12/2015 11:23 AM, 9/10/2014  3:33 PM  
 Influenza Vaccine 10/24/2013 Influenza Vaccine Split 10/28/2011 Influenza Vaccine Whole 10/25/2010 Pneumococcal Conjugate (PCV-13) 4/21/2016 10:28 AM  
 TB Skin Test (PPD) 1/1/1992 Not reviewed this visit You Were Diagnosed With   
 Codes Comments PAF (paroxysmal atrial fibrillation) ([facility]    -  Primary ICD-10-CM: I48.0 ICD-9-CM: 427.31 Atherosclerosis of native coronary artery of native heart without angina pectoris     ICD-10-CM: I25.10 ICD-9-CM: 414.01 Mild aortic stenosis     ICD-10-CM: I35.0 ICD-9-CM: 424.1 Essential hypertension     ICD-10-CM: I10 
ICD-9-CM: 401.9 Vitals BP Pulse Height(growth percentile) Weight(growth percentile) SpO2 BMI  
 130/72 82 5' 2\" (1.575 m) 126 lb (57.2 kg) 97% 23.05 kg/m2 OB Status Smoking Status Hysterectomy Never Smoker Vitals History BMI and BSA Data Body Mass Index Body Surface Area 23.05 kg/m 2 1.58 m 2 Preferred Pharmacy Pharmacy Name Phone [facility] [address] [facility], [address] [facility] [phone] Your Updated Medication List  
  
   
This list is accurate as of: 4/18/17 10:36 AM.  Always use your most recent med list.  
  
  
  
  
 ALEVE 220 mg Cap Generic drug:  naproxen sodium Take  by mouth. amLODIPine 10 mg tablet Commonly known as:  NORVASC  
take 1 tablet by mouth daily  
  
 ascorbic acid (vitamin C) 250 mg tablet Commonly known as:  VITAMIN C Take 1 Tab by mouth two (2) times daily (with meals). [Request refills from PCP (Dr. Jermaine Chapman)] aspirin 81 mg tablet Take 81 mg by mouth daily. carvedilol 12.5 mg tablet Commonly known as:  COREG  
take 1 tablet by mouth twice a day  
  
 cholecalciferol 1,000 unit tablet Commonly known as:  VITAMIN D3 Take 2 Tabs by mouth daily. [Request refills from PCP (Dr. Jermaine Chapman)] docusate sodium 100 mg capsule Commonly known as:  Mello Evert One capsule by mouth twice daily FIBER PO Take  by mouth. FISH OIL PO Take 3 Tabs by mouth daily. hydroCHLOROthiazide 12.5 mg capsule Commonly known as:  Jean Claude Sheppardtead Take 1 Cap by mouth daily. Lactobacillus acidophilus Cap Commonly known as:  BACID Take 2 capsules by mouth two (2) times a day. losartan 50 mg tablet Commonly known as:  COZAAR Take 1 Tab by mouth daily. lovastatin 20 mg tablet Commonly known as:  MEVACOR Take 1 Tab by mouth daily. MULTIVITAMIN PO Take 1 Tab by mouth daily. PriLOSEC 20 mg capsule Generic drug:  omeprazole Take 20 mg by mouth daily as needed. tolterodine 2 mg tablet Commonly known as:  Jung Wheeler Take 1 Tab by mouth two (2) times a day. We Performed the Following AMB POC EKG ROUTINE W/ 12 LEADS, INTER & REP [39883 CPT(R)] Patient Instructions Start Xarelto 15mg one tab dailyRivaroxaban (By mouth) Rivaroxaban (wee-l-FUZ-a-ban) Treats and prevents blood clots, which lowers the risk of stroke, deep vein thrombosis (DVT), pulmonary embolism (PE), and similar conditions. This medicine is a blood thinner. Brand Name(s):Xarelto, Xarelto Starter Pack There may be other brand names for this medicine. When This Medicine Should Not Be Used: This medicine is not right for everyone. Do not use it if you had an allergic reaction to rivaroxaban, or you have severe bleeding. How to Use This Medicine:  
Tablet · Take this medicine as directed, and take it at the same time each day. · 10-milligram (mg) tablet: Take with or without food. · 15-mg or 20-mg tablet: Take with food. · If you cannot swallow the tablets, you may crush the tablet and mix it with applesauce. Eat some food after you swallow the mixture. · Tube feeding: You may crush the tablet and mix the medicine in 50 milliliters (mL) of water before giving it via the tube. This must be followed by a feeding. · This medicine should come with a Medication Guide. Ask your pharmacist for a copy if you do not have one. · Missed dose: ¨ Ask your doctor or pharmacist if you are not sure what to do if you miss a dose. ¨ Once-daily dose: If you miss a dose or forget to use your medicine, use it as soon as you can on the same day. Do not use extra medicine to make up for a missed dose. ¨ Twice-daily dose to treat a blood clot (15-mg tablet): If you miss a dose or forget to use your medicine, use it as soon as you can on the same day. You may take 2 doses at the same time to make up for the missed dose. This is only for people who take a total of 30 mg per day. · Store the medicine in a closed container at room temperature, away from heat, moisture, and direct light. Drugs and Foods to Avoid: Ask your doctor or pharmacist before using any other medicine, including over-the-counter medicines, vitamins, and herbal products. · Some foods and medicines can affect how rivaroxaban works. Tell your doctor if you are using any of the following: ¨ NSAID medicine (including aspirin, celecoxib, diclofenac, ibuprofen, naproxen) ¨ Ketoconazole, itraconazole, lopinavir, ritonavir, indinavir, conivaptan, carbamazepine, phenytoin, rifampin, Alannah's wort ¨ Another blood thinner (including clopidogrel, enoxaparin, heparin, warfarin) Warnings While Using This Medicine: · Tell your doctor if you are pregnant or breastfeeding, or if you have kidney disease, liver disease, bleeding problems, or an artificial heart valve. · This medicine may increase your risk of bleeding. Be careful to avoid injuries that could cause bleeding. Stay away from rough sports or other situations where you could be bruised, cut, or hurt. Brush and floss your teeth gently. Be careful when using sharp objects, including razors and fingernail clippers. Avoid picking your nose. If you need to blow your nose, blow it gently. · This medicine may cause nerve damage if you have a medical procedure done to your back, including anesthesia or a spinal puncture.  This is more likely to happen if you have a history of back injury, back surgery, problems with your spine, or procedures or punctures to your back. Tell your doctor if you are also taking another blood thinner, because this also increases the risk. · Do not stop using this medicine suddenly without asking your doctor. You might have a higher risk of stroke for a short time after you stop using this medicine. · Tell any doctor or dentist who treats you that you are using this medicine. · Your doctor will do lab tests at regular visits to check on the effects of this medicine. Keep all appointments. · Keep all medicine out of the reach of children. Never share your medicine with anyone. Possible Side Effects While Using This Medicine:  
Call your doctor right away if you notice any of these side effects: · Allergic reaction: Itching or hives, swelling in your face or hands, swelling or tingling in your mouth or throat, chest tightness, trouble breathing · Blistering, peeling, or red skin rash · Decrease in how much or how often you urinate · Heavy menstrual bleeding, or vaginal bleeding · Red or brown urine, bloody or black, tarry stools · Unusual bleeding or bruising, including frequent nosebleeds · Vomiting blood or material that looks like coffee grounds If you notice other side effects that you think are caused by this medicine, tell your doctor. Call your doctor for medical advice about side effects. You may report side effects to FDA at 7-190-FDA-5391 © 2016 [address] is for End User's use only and may not be sold, redistributed or otherwise used for commercial purposes. The above information is an  only. It is not intended as medical advice for individual conditions or treatments. Talk to your doctor, nurse or pharmacist before following any medical regimen to see if it is safe and effective for you. Introducing [facility]!    
 Jorge L Part introduces [employer] patient portal. Now you can access parts of your medical record, email your doctor's office, and request medication refills online. 1. In your internet browser, go to [email or web address] 2. Click on the First Time User? Click Here link in the Sign In box. You will see the New Member Sign Up page. 3. Enter your [employer]om[employer] Access Code exactly as it appears below. You will not need to use this code after youve completed the sign-up process. If you do not sign up before the expiration date, you must request a new code. · [employer] Access Code: [email or web address] Expires: 7/17/2017 10:36 AM 
 
4. Enter the last four digits of your Social Security Number (xxxx) and Date of Birth (mm/dd/yyyy) as indicated and click Submit. You will be taken to the next sign-up page. 5. Create a Womensforum ID. This will be your Wom[employer] login ID and cannot be changed, so think of one that is secure and easy to remember. 6. Create a [employer] password. You can change your password at any time. 7. Enter your Password Reset Question and Answer. This can be used at a later time if you forget your password. 8. Enter your e-mail address. You will receive e-mail notification when new information is available in [address] 9. Click Sign Up. You can now view and download portions of your medical record. 10. Click the Download Summary menu link to download a portable copy of your medical information. If you have questions, please visit the Frequently Asked Questions section of the [employer] website. Remember, [employer] is NOT to be used for urgent needs. For medical emergencies, dial 911. Now available from your iPhone and Android! Please provide this summary of care documentation to your next provider. Your primary care clinician is listed as Cece Jones. If you have any questions after today's visit, please call [phone].

## 2017-04-18 NOTE — PROGRESS NOTES
1. Have you been to the ER, urgent care clinic since your last visit? Hospitalized since your last visit? no  2. Have you seen or consulted any other health care providers outside of the [address] [place] [address] since your last visit? Include any pap smears or colon screening.  no

## 2017-04-20 DIAGNOSIS — E78.5 HYPERLIPIDEMIA LDL GOAL <100: ICD-10-CM

## 2017-04-20 DIAGNOSIS — E55.9 HYPOVITAMINOSIS D: ICD-10-CM

## 2017-04-20 DIAGNOSIS — I10 ESSENTIAL HYPERTENSION: Chronic | ICD-10-CM

## 2017-04-21 ENCOUNTER — HOSPITAL ENCOUNTER (EMERGENCY)
Age: 82
Discharge: HOME | End: 2017-04-21
Attending: EMERGENCY MEDICINE
Payer: MEDICARE

## 2017-04-21 VITALS
SYSTOLIC BLOOD PRESSURE: 120 MMHG | TEMPERATURE: 97.9 F | WEIGHT: 120 LBS | BODY MASS INDEX: 21.95 KG/M2 | RESPIRATION RATE: 20 BRPM | OXYGEN SATURATION: 96 % | HEART RATE: 87 BPM | DIASTOLIC BLOOD PRESSURE: 78 MMHG

## 2017-04-21 DIAGNOSIS — N30.01 ACUTE CYSTITIS WITH HEMATURIA: Primary | ICD-10-CM

## 2017-04-21 LAB
APPEARANCE UR: ABNORMAL
BACTERIA URNS QL MICRO: ABNORMAL /HPF
BILIRUB UR QL: NEGATIVE
COLOR UR: YELLOW
EPITH CASTS URNS QL MICRO: ABNORMAL /LPF (ref 0–5)
GLUCOSE UR STRIP.AUTO-MCNC: NEGATIVE MG/DL
HGB UR QL STRIP: ABNORMAL
KETONES UR QL STRIP.AUTO: NEGATIVE MG/DL
LEUKOCYTE ESTERASE UR QL STRIP.AUTO: ABNORMAL
NITRITE UR QL STRIP.AUTO: POSITIVE
PH UR STRIP: 6.5 [PH] (ref 5–8)
PROT UR STRIP-MCNC: NEGATIVE MG/DL
RBC #/AREA URNS HPF: ABNORMAL /HPF (ref 0–5)
SP GR UR REFRACTOMETRY: 1.01 (ref 1–1.03)
UROBILINOGEN UR QL STRIP.AUTO: 1 EU/DL (ref 0.2–1)
WBC URNS QL MICRO: ABNORMAL /HPF (ref 0–4)

## 2017-04-21 PROCEDURE — 99282 EMERGENCY DEPT VISIT SF MDM: CPT

## 2017-04-21 PROCEDURE — 81001 URINALYSIS AUTO W/SCOPE: CPT | Performed by: EMERGENCY MEDICINE

## 2017-04-21 RX ORDER — CIPROFLOXACIN 500 MG/1
TABLET ORAL
Qty: 10 TAB | Refills: 0 | Status: SHIPPED | OUTPATIENT
Start: 2017-04-21 | End: 2017-04-28 | Stop reason: ALTCHOICE

## 2017-04-21 NOTE — ED PROVIDER NOTES
HPI Comments: 9:05 AM Dangelo Gambino is a 80 y.o. Female, with h/o frequent UTIs and kidney disease (unknown stage), who presents to ED c/o hematuria onset 6AM. Pt explains that she went to the bathroom every 2 hours through the night and had no abnormalities during that time. She also reports that she recently started her blood thinning medication last night at meal time and takes Aspirin daily. Pt also c/o increased frequency and dysuria. Pt denies abd pain, fever, chills, or any other symptom at this time. No other acute symptoms or complaints were noted. Patient is a 80 y.o. female presenting with hematuria. The history is provided by the patient. Blood in Urine    Associated symptoms include hematuria. Pertinent negatives include no nausea, no vomiting, no abdominal pain and no back pain. Past Medical History:   Diagnosis Date    Allergic rhinitis     Anemia     Asymptomatic carotid bruit     asymptomatic    Atrial fibrillation ([address] 04/2017    Started on Xarelto    Cardiac cath 12/15/2010    pRCA 30%. LM patent. LAD 25%. CX 30%. LVEDP 10 mmHg. EF 60%. Mild AS.  Cardiac nuclear imaging test, low to mod risk 02/22/2016    Low to intermediate risk. Sm reversible inferoapical defect may be a sm area of ischemia. No prior infarction. EF >75%. Neg EKG on pharm stress test.    Carotid duplex 02/10/2014    Mild 1-49% bilateral ICA stenosis.      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 Raven Finney Hypertension     Lung collapse Dec 2011    Collapse of left upper lobe    Mild aortic stenosis 8/22/2011    Osteoarthritis     Osteoporosis     Positive PPD        Past Surgical History:   Procedure Laterality Date    HX CATARACT REMOVAL      HX COLONOSCOPY      HX HEART CATHETERIZATION  12/15/10    negative    HX HYSTERECTOMY      HX KNEE ARTHROSCOPY      HX PTCA  3/1995    of the LAD; has had no recurrent angina since    HX TONSILLECTOMY      NH BRONCHOSCOPY BRONCHIAL/ENDOBRNCL BX 1+ SITES  9/25/2012              Family History:   Problem Relation Age of Onset    Heart Disease Mother      History of CHF    Heart Disease Father     Heart Attack Father     Heart Disease Sister     Cancer Brother      non-Hodgkin lymphoma    Cancer Brother      liver cancer    Heart Disease Brother     Stroke Brother     Parkinsonism Sister     Parkinsonism Brother        Social History     Social History    Marital status:      Spouse name: N/A    Number of children: N/A    Years of education: N/A     Occupational History    Not on file. Social History Main Topics    Smoking status: Never Smoker    Smokeless tobacco: Never Used      Comment: extensive secondhand smoke exposure through job    Alcohol use No    Drug use: No    Sexual activity: No     Other Topics Concern    Not on file     Social History Narrative         ALLERGIES: Bactrim [sulfamethoprim ds]; Vicodin [hydrocodone-acetaminophen]; Atenolol; Codeine; Erythromycin; Other medication; and Pcn [penicillins]    Review of Systems   Constitutional: Negative for fever. HENT: Negative for congestion and rhinorrhea. Respiratory: Negative for shortness of breath. Cardiovascular: Negative for chest pain. Gastrointestinal: Negative for abdominal pain, diarrhea, nausea and vomiting.    Genitourinary: Positive for dysuria and hematuria. Musculoskeletal: Negative for back pain. Skin: Negative for rash. Neurological: Negative for dizziness and headaches. All other systems reviewed and are negative. Vitals:    04/21/17 0842   BP: 120/78   Pulse: 87   Resp: 20   Temp: 97.9 °F (36.6 °C)   SpO2: 96%   Weight: 54.4 kg (120 lb)            Physical Exam   Constitutional: She is oriented to person, place, and time. She appears well-developed and well-nourished. HENT:   Head: Normocephalic and atraumatic. Neck: Neck supple. No JVD present. Cardiovascular: Normal rate and regular rhythm. Pulmonary/Chest: Effort normal and breath sounds normal. No respiratory distress. Abdominal: Soft. There is no tenderness. Musculoskeletal: She exhibits no edema. Neurological: She is alert and oriented to person, place, and time. Skin: Skin is warm and dry. No rash noted. Psychiatric: Judgment normal.        MDM  Number of Diagnoses or Management Options  Acute cystitis with hematuria:   Diagnosis management comments: Results reviewed with pt, she agrees with dispo and F/U plan. Chapis Fairbanks MD  11:33 AM         Amount and/or Complexity of Data Reviewed  Clinical lab tests: ordered and reviewed      ED Course       Procedures    Vitals:  Patient Vitals for the past 12 hrs:   Temp Pulse Resp BP SpO2   04/21/17 0842 97.9 °F (36.6 °C) 87 20 120/78 96 %     96% on RA, indicating adequate oxygenation.      Lab findings:  Recent Results (from the past 12 hour(s))   URINALYSIS W/ RFLX MICROSCOPIC    Collection Time: 04/21/17  9:35 AM   Result Value Ref Range    Color YELLOW      Appearance CLOUDY      Specific gravity 1.012 1.005 - 1.030      pH (UA) 6.5 5.0 - 8.0      Protein NEGATIVE  NEG mg/dL    Glucose NEGATIVE  NEG mg/dL    Ketone NEGATIVE  NEG mg/dL    Bilirubin NEGATIVE  NEG      Blood LARGE (A) NEG      Urobilinogen 1.0 0.2 - 1.0 EU/dL    Nitrites POSITIVE (A) NEG      Leukocyte Esterase LARGE (A) NEG URINE MICROSCOPIC ONLY    Collection Time: 04/21/17  9:35 AM   Result Value Ref Range    WBC TOO NUMEROUS TO COUNT 0 - 4 /hpf    RBC 21 to 35 0 - 5 /hpf    Epithelial cells 1+ 0 - 5 /lpf    Bacteria 4+ (A) NEG /hpf       Reevaluation of patient:   11:28 AM Patient rechecked. Discussed all lab findings, diagnosis, and plan for dc. She will be sent home with Cipro and was instructed to return to the ED if symptoms worsen or do not resolve within the next 7-10 days    Disposition:  Diagnosis:   1. Acute cystitis with hematuria        Disposition: discharged home    Follow-up Information     None            Patient's Medications   Start Taking    No medications on file   Continue Taking    AMLODIPINE (NORVASC) 10 MG TABLET    take 1 tablet by mouth daily    ASCORBIC ACID (VITAMIN C) 250 MG TABLET    Take 1 Tab by mouth two (2) times daily (with meals). [Request refills from PCP (Dr. Eb Chapman)]    ASPIRIN 81 MG TABLET    Take 81 mg by mouth daily. CARVEDILOL (COREG) 12.5 MG TABLET    take 1 tablet by mouth twice a day    CHOLECALCIFEROL (VITAMIN D3) 1,000 UNIT TABLET    Take 2 Tabs by mouth daily. [Request refills from PCP (Dr. Eb Chapman)]    DOCOSAHEXANOIC ACID/EPA (FISH OIL PO)    Take 3 Tabs by mouth daily. DOCUSATE SODIUM (COLACE) 100 MG CAPSULE    One capsule by mouth twice daily    HYDROCHLOROTHIAZIDE (MICROZIDE) 12.5 MG CAPSULE    Take 1 Cap by mouth daily. LACTOBACILLUS ACIDOPHILUS (BACID) CAP    Take 2 capsules by mouth two (2) times a day. LOSARTAN (COZAAR) 50 MG TABLET    Take 1 Tab by mouth daily. LOVASTATIN (MEVACOR) 20 MG TABLET    Take 1 Tab by mouth daily. MULTIVITAMIN PO    Take 1 Tab by mouth daily. NAPROXEN SODIUM (ALEVE) 220 MG CAP    Take  by mouth. OMEPRAZOLE (PRILOSEC) 20 MG CAPSULE    Take 20 mg by mouth daily as needed. PSYLLIUM SEED, WITH DEXTROSE, (FIBER PO)    Take  by mouth. RIVAROXABAN (XARELTO) 15 MG TAB TABLET    Take 1 Tab by mouth daily. TOLTERODINE (DETROL) 2 MG TABLET    Take 1 Tab by mouth two (2) times a day.    These Medications have changed    No medications on file   Stop Taking    No medications on file     Scribe Attestation:   I, Dhruv Silva, am acting as a scribe for, and in the presence of, Dr. Janelle Gordillo MD April 21, 2017 at 9:13 AM   Signed by: Lee Ferrell, April 21, 2017 9:13 AM    Physician Attestation   I personally performed the services described in this documentation, reviewed and edited the documentation which was dictated to the scribe in my presence, and it accurately records my words and actions  Janelle Gordillo MD April 21, 2017 at 9:13 AM

## 2017-04-21 NOTE — ED TRIAGE NOTES
Blood in urine started 6 am. No pain, dysuria or frequency.  Started on Xarelto last night for recent afib diagnosis by Dr Kailey Byrnes

## 2017-04-21 NOTE — DISCHARGE INSTRUCTIONS
Urinary Tract Infection in Women: Care Instructions  Your Care Instructions    A urinary tract infection, or UTI, is a general term for an infection anywhere between the kidneys and the urethra (where urine comes out). Most UTIs are bladder infections. They often cause pain or burning when you urinate. UTIs are caused by bacteria and can be cured with antibiotics. Be sure to complete your treatment so that the infection goes away. Follow-up care is a key part of your treatment and safety. Be sure to make and go to all appointments, and call your doctor if you are having problems. It's also a good idea to know your test results and keep a list of the medicines you take. How can you care for yourself at home? · Take your antibiotics as directed. Do not stop taking them just because you feel better. You need to take the full course of antibiotics. · Drink extra water and other fluids for the next day or two. This may help wash out the bacteria that are causing the infection. (If you have kidney, heart, or liver disease and have to limit fluids, talk with your doctor before you increase your fluid intake.)  · Avoid drinks that are carbonated or have caffeine. They can irritate the bladder. · Urinate often. Try to empty your bladder each time. · To relieve pain, take a hot bath or lay a heating pad set on low over your lower belly or genital area. Never go to sleep with a heating pad in place. To prevent UTIs  · Drink plenty of water each day. This helps you urinate often, which clears bacteria from your system. (If you have kidney, heart, or liver disease and have to limit fluids, talk with your doctor before you increase your fluid intake.)  · Urinate when you need to. · Urinate right after you have sex. · Change sanitary pads often. · Avoid douches, bubble baths, feminine hygiene sprays, and other feminine hygiene products that have deodorants.   · After going to the bathroom, wipe from front to back.  When should you call for help? Call your doctor now or seek immediate medical care if:  · Symptoms such as fever, chills, nausea, or vomiting get worse or appear for the first time. · You have new pain in your back just below your rib cage. This is called flank pain. · There is new blood or pus in your urine. · You have any problems with your antibiotic medicine. Watch closely for changes in your health, and be sure to contact your doctor if:  · You are not getting better after taking an antibiotic for 2 days. · Your symptoms go away but then come back. Where can you learn more? Go to http://raine-geovanny.info/. Enter D324 in the search box to learn more about \"Urinary Tract Infection in Women: Care Instructions. \"  Current as of: November 28, 2016  Content Version: 11.2  © 6375-1690 [employer], [employer]. Care instructions adapted under license by [employer] (which disclaims liability or warranty for this information). If you have questions about a medical condition or this instruction, always ask your healthcare professional. [address] any warranty or liability for your use of this information.

## 2017-04-21 NOTE — ED NOTES
C/o dysuria and urgency x2 days but felt there was  Blood in urine last night after starting Xarelto.

## 2017-04-24 ENCOUNTER — PATIENT OUTREACH (OUTPATIENT)
Dept: INTERNAL MEDICINE CLINIC | Age: 82
End: 2017-04-24

## 2017-04-24 NOTE — PROGRESS NOTES
Patient admitted to [address], 4/21/17 to 4/21/17 for acute cystitis. Presenting symptoms:   Blood in urine    New medications/changes to current medications:  Cipro 500 mg    ED utilization in past 6 months: 0    Contacted patient for ED follow up. Verified 2 patient identifiers. Introduced self, role and reason for call. Patient reports:  Little burning with urination  Fatigue  Clear urine    Patient denies:  Dysuria  Lower abdominal or flank pain  Chills/fever  N/V  Dizziness  AMS    ADL's:  Ambulates with cane. Has rolling walker on hand. Performs all other ADL's independently. DME:   Cane; rolling walker on hand    Support:   Spouse, daughter     Patient reports hx of frequent UTI's but had never seen blood like this in urine which is why she went to ED. Hx of A-fib. Started taking Xarelto Thursday evening (4/20/17) and saw ant blood in urine of the morning of 4/21/17. Denies blood in urine today but did have hematuria X 2 yesterday. Did suggest patient inform Dr. Abram Urias of recent visit to ED. Ambulating with cane for balance but states she has a rolling walker on hand. Patient verbalizes Dr. Abram Urias encouraged her to start using walker for more stability however patient continues to use cane. States it takes more to use walker. Encouraged patient to begin using walker as this may provide more stability and reduce falls. Educated on the importance of completing antibiotic. Instructed patient to report/monitor for any new or concerning symptoms or worsening of condition. Patient verbalizes understanding of information discussed. Opportunity to ask questions was provided. Patient has contact information was provided for future reference or further questions. Appointment(s):  Labs 4/25/17 at 11:05 AM  Dr. Ariadne Cruz on 5/2/17 at 10 AM  Patient plans to drive self to appointments.

## 2017-04-25 ENCOUNTER — HOSPITAL ENCOUNTER (OUTPATIENT)
Dept: LAB | Age: 82
Discharge: HOME | End: 2017-04-25
Payer: MEDICARE

## 2017-04-25 ENCOUNTER — TELEPHONE (OUTPATIENT)
Dept: CARDIOLOGY CLINIC | Age: 82
End: 2017-04-25

## 2017-04-25 ENCOUNTER — LAB ONLY (OUTPATIENT)
Dept: INTERNAL MEDICINE CLINIC | Age: 82
End: 2017-04-25

## 2017-04-25 DIAGNOSIS — E78.5 HYPERLIPIDEMIA LDL GOAL <100: ICD-10-CM

## 2017-04-25 DIAGNOSIS — I10 ESSENTIAL HYPERTENSION: Primary | ICD-10-CM

## 2017-04-25 DIAGNOSIS — E55.9 HYPOVITAMINOSIS D: ICD-10-CM

## 2017-04-25 DIAGNOSIS — I10 ESSENTIAL HYPERTENSION: ICD-10-CM

## 2017-04-25 LAB
ALBUMIN SERPL BCP-MCNC: 3.7 G/DL (ref 3.4–5)
ALBUMIN/GLOB SERPL: 1.2 {RATIO} (ref 0.8–1.7)
ALP SERPL-CCNC: 85 U/L (ref 45–117)
ALT SERPL-CCNC: 77 U/L (ref 13–56)
ANION GAP BLD CALC-SCNC: 9 MMOL/L (ref 3–18)
AST SERPL W P-5'-P-CCNC: 58 U/L (ref 15–37)
BASOPHILS # BLD AUTO: 0 K/UL (ref 0–0.06)
BASOPHILS # BLD: 1 % (ref 0–2)
BILIRUB SERPL-MCNC: 0.8 MG/DL (ref 0.2–1)
BUN SERPL-MCNC: 27 MG/DL (ref 7–18)
BUN/CREAT SERPL: 36 (ref 12–20)
CALCIUM SERPL-MCNC: 9.5 MG/DL (ref 8.5–10.1)
CHLORIDE SERPL-SCNC: 104 MMOL/L (ref 100–108)
CHOLEST SERPL-MCNC: 135 MG/DL
CO2 SERPL-SCNC: 29 MMOL/L (ref 21–32)
CREAT SERPL-MCNC: 0.75 MG/DL (ref 0.6–1.3)
DIFFERENTIAL METHOD BLD: ABNORMAL
EOSINOPHIL # BLD: 0.2 K/UL (ref 0–0.4)
EOSINOPHIL NFR BLD: 4 % (ref 0–5)
ERYTHROCYTE [DISTWIDTH] IN BLOOD BY AUTOMATED COUNT: 13.7 % (ref 11.6–14.5)
GLOBULIN SER CALC-MCNC: 3.1 G/DL (ref 2–4)
GLUCOSE SERPL-MCNC: 85 MG/DL (ref 74–99)
HCT VFR BLD AUTO: 38.5 % (ref 35–45)
HDLC SERPL-MCNC: 86 MG/DL (ref 40–60)
HDLC SERPL: 1.6 {RATIO} (ref 0–5)
HGB BLD-MCNC: 12.2 G/DL (ref 12–16)
LDLC SERPL CALC-MCNC: 41 MG/DL (ref 0–100)
LIPID PROFILE,FLP: ABNORMAL
LYMPHOCYTES # BLD AUTO: 31 % (ref 21–52)
LYMPHOCYTES # BLD: 1.6 K/UL (ref 0.9–3.6)
MCH RBC QN AUTO: 31.2 PG (ref 24–34)
MCHC RBC AUTO-ENTMCNC: 31.7 G/DL (ref 31–37)
MCV RBC AUTO: 98.5 FL (ref 74–97)
MONOCYTES # BLD: 0.5 K/UL (ref 0.05–1.2)
MONOCYTES NFR BLD AUTO: 10 % (ref 3–10)
NEUTS SEG # BLD: 2.8 K/UL (ref 1.8–8)
NEUTS SEG NFR BLD AUTO: 54 % (ref 40–73)
PLATELET # BLD AUTO: 172 K/UL (ref 135–420)
PMV BLD AUTO: 10.3 FL (ref 9.2–11.8)
POTASSIUM SERPL-SCNC: 4.3 MMOL/L (ref 3.5–5.5)
PROT SERPL-MCNC: 6.8 G/DL (ref 6.4–8.2)
RBC # BLD AUTO: 3.91 M/UL (ref 4.2–5.3)
SODIUM SERPL-SCNC: 142 MMOL/L (ref 136–145)
T4 FREE SERPL-MCNC: 1.1 NG/DL (ref 0.7–1.5)
TRIGL SERPL-MCNC: 40 MG/DL (ref ?–150)
TSH SERPL DL<=0.05 MIU/L-ACNC: 1.17 UIU/ML (ref 0.36–3.74)
VLDLC SERPL CALC-MCNC: 8 MG/DL
WBC # BLD AUTO: 5.1 K/UL (ref 4.6–13.2)

## 2017-04-25 PROCEDURE — 84439 ASSAY OF FREE THYROXINE: CPT | Performed by: INTERNAL MEDICINE

## 2017-04-25 PROCEDURE — 80053 COMPREHEN METABOLIC PANEL: CPT | Performed by: INTERNAL MEDICINE

## 2017-04-25 PROCEDURE — 80061 LIPID PANEL: CPT | Performed by: INTERNAL MEDICINE

## 2017-04-25 PROCEDURE — 82306 VITAMIN D 25 HYDROXY: CPT | Performed by: INTERNAL MEDICINE

## 2017-04-25 PROCEDURE — 84443 ASSAY THYROID STIM HORMONE: CPT | Performed by: INTERNAL MEDICINE

## 2017-04-25 PROCEDURE — 85025 COMPLETE CBC W/AUTO DIFF WBC: CPT | Performed by: INTERNAL MEDICINE

## 2017-04-25 PROCEDURE — 36415 COLL VENOUS BLD VENIPUNCTURE: CPT | Performed by: INTERNAL MEDICINE

## 2017-04-26 LAB — 25(OH)D3 SERPL-MCNC: 43.1 NG/ML (ref 30–100)

## 2017-04-28 ENCOUNTER — HOSPITAL ENCOUNTER (OUTPATIENT)
Dept: LAB | Age: 82
Discharge: HOME | End: 2017-04-28
Payer: MEDICARE

## 2017-04-28 ENCOUNTER — OFFICE VISIT (OUTPATIENT)
Dept: INTERNAL MEDICINE CLINIC | Age: 82
End: 2017-04-28

## 2017-04-28 VITALS
HEART RATE: 71 BPM | BODY MASS INDEX: 23.15 KG/M2 | WEIGHT: 125.8 LBS | SYSTOLIC BLOOD PRESSURE: 132 MMHG | HEIGHT: 62 IN | OXYGEN SATURATION: 97 % | DIASTOLIC BLOOD PRESSURE: 62 MMHG | RESPIRATION RATE: 12 BRPM | TEMPERATURE: 97.7 F

## 2017-04-28 DIAGNOSIS — R30.0 DYSURIA: ICD-10-CM

## 2017-04-28 DIAGNOSIS — I48.20 CHRONIC ATRIAL FIBRILLATION (HCC): ICD-10-CM

## 2017-04-28 DIAGNOSIS — R31.0 GROSS HEMATURIA: Primary | ICD-10-CM

## 2017-04-28 DIAGNOSIS — Z23 ENCOUNTER FOR IMMUNIZATION: ICD-10-CM

## 2017-04-28 DIAGNOSIS — I10 ESSENTIAL HYPERTENSION WITH GOAL BLOOD PRESSURE LESS THAN 140/90: ICD-10-CM

## 2017-04-28 DIAGNOSIS — E78.5 HYPERLIPIDEMIA LDL GOAL <100: ICD-10-CM

## 2017-04-28 DIAGNOSIS — R31.0 GROSS HEMATURIA: ICD-10-CM

## 2017-04-28 LAB
APPEARANCE UR: ABNORMAL
BACTERIA URNS QL MICRO: ABNORMAL /HPF
BILIRUB UR QL: NEGATIVE
COLOR UR: ABNORMAL
EPITH CASTS URNS QL MICRO: NEGATIVE /LPF (ref 0–5)
GLUCOSE UR STRIP.AUTO-MCNC: NEGATIVE MG/DL
HGB UR QL STRIP: ABNORMAL
KETONES UR QL STRIP.AUTO: NEGATIVE MG/DL
LEUKOCYTE ESTERASE UR QL STRIP.AUTO: ABNORMAL
NITRITE UR QL STRIP.AUTO: NEGATIVE
PH UR STRIP: 5.5 [PH] (ref 5–8)
PROT UR STRIP-MCNC: 30 MG/DL
RBC #/AREA URNS HPF: >100 /HPF (ref 0–5)
SP GR UR REFRACTOMETRY: 1.02 (ref 1–1.03)
UROBILINOGEN UR QL STRIP.AUTO: 0.2 EU/DL (ref 0.2–1)
WBC URNS QL MICRO: ABNORMAL /HPF (ref 0–4)

## 2017-04-28 PROCEDURE — 81001 URINALYSIS AUTO W/SCOPE: CPT | Performed by: INTERNAL MEDICINE

## 2017-04-28 PROCEDURE — 87086 URINE CULTURE/COLONY COUNT: CPT | Performed by: INTERNAL MEDICINE

## 2017-04-28 NOTE — MR AVS SNAPSHOT
Visit Information Date & Time Provider Department Dept. Phone Encounter #  
 4/28/2017  8:15 AM Nader Hope MD Internist of [address] [id number] Your Appointments 5/2/2017 10:00 AM  
Office Visit with Nader Hope MD  
Internist of [address] [place] [address]) Appt Note: 4 mth f/u  
 [address] [facility], [address] [facility] [address] [place]  
  
   
 [address] Rajinder  
  
    
 5/16/2017 11:30 AM  
XRAY with [facility] [address] ([address]) Appt Note: APPT [email or web address]  
 [address] [place] [address], [address] [address]  
[phone]  
  
   
 [address] [place] [address], [address] [place] [address],[address] [place] [address] 5/16/2017 11:45 AM  
Follow Up with Josie Fernandez MD  
[address] ([address] [place] [address]) Appt Note: [email or web address] [address] [place] [address], [address], [address] [place] [address],[address] [place] [address]  
  
    
 7/5/2017 10:00 AM  
Follow Up with Shivani De Paz MD  
[facility] ([address] [place] [address]) Appt Note: 3 month f/up [place] [address] [address] [address] [phone] [address]  
  
    
 8/31/2017 10:00 AM  
Office Visit with Nader Hope MD  
Internist of [address] [place] [address]) Appt Note: 4 month f/u  
 [address] [facility], [address] [place] [address]  
[phone] Upcoming Health Maintenance Date Due Pneumococcal 65+ Low/Medium Risk (2 of 2 - PPSV23) 4/21/2017 MEDICARE YEARLY EXAM 4/22/2017 DTaP/Tdap/Td series (1 - Tdap) 5/26/2017* GLAUCOMA SCREENING Q2Y 2/6/2019 *Topic was postponed. The date shown is not the original due date.    
  
Allergies as of 4/28/2017  Review Complete On: 4/28/2017 By: Nader Hope MD  
  
 Severity Noted Reaction Type Reactions Bactrim [Sulfamethoprim Ds] High 02/11/2013   Systemic Nausea and Vomiting Vicodin [Hydrocodone-acetaminophen] Medium 10/03/2012   Side Effect Nausea Only Atenolol    Other (comments)  
 interolance Codeine    Other (comments)  
 headache Erythromycin    Unknown (comments) Other Medication    Not Reported This Time Refined sugar Pcn [Penicillins]    Hives Current Immunizations  Reviewed on 8/26/2016 Name Date Influenza High Dose Vaccine PF 8/26/2016 10:11 AM, 10/12/2015 11:23 AM, 9/10/2014  3:33 PM  
 Influenza Vaccine 10/24/2013 Influenza Vaccine Split 10/28/2011 Influenza Vaccine Whole 10/25/2010 Pneumococcal Conjugate (PCV-13) 4/21/2016 10:28 AM  
 Pneumococcal Polysaccharide (PPSV-23)  Incomplete TB Skin Test (PPD) 1/1/1992 Not reviewed this visit You Were Diagnosed With   
  
 Codes Comments Gross hematuria    -  Primary ICD-10-CM: R31.0 ICD-9-CM: 599.71 Encounter for immunization     ICD-10-CM: M22 ICD-9-CM: V03.89 Chronic atrial fibrillation (HCC)     ICD-10-CM: S95.9 ICD-9-CM: 427.31 Essential hypertension with goal blood pressure less than 140/90     ICD-10-CM: I10 
ICD-9-CM: 401.9 Hyperlipidemia LDL goal <100     ICD-10-CM: E78.5 ICD-9-CM: 272.4 Dysuria     ICD-10-CM: R30.0 ICD-9-CM: 157. 1 Vitals BP Pulse Temp Resp Height(growth percentile) Weight(growth percentile) 132/62 71 97.7 °F (36.5 °C) (Oral) 12 5' 2\" (1.575 m) 125 lb 12.8 oz (57.1 kg) SpO2 BMI OB Status Smoking Status 97% 23.01 kg/m2 Hysterectomy Never Smoker Vitals History BMI and BSA Data Body Mass Index Body Surface Area 23.01 kg/m 2 1.58 m 2 Preferred Pharmacy Pharmacy Name Phone [facility] [address] [facility], [address] [place] [address] [phone] Your Updated Medication List  
  
   
 This list is accurate as of: 4/28/17  8:59 AM.  Always use your most recent med list.  
  
  
  
  
 ALEVE 220 mg Cap Generic drug:  naproxen sodium Take  by mouth. amLODIPine 10 mg tablet Commonly known as:  NORVASC  
take 1 tablet by mouth daily  
  
 ascorbic acid (vitamin C) 250 mg tablet Commonly known as:  VITAMIN C Take 1 Tab by mouth two (2) times daily (with meals). [Request refills from PCP (Dr. Ariana Chapman)] aspirin 81 mg tablet Take 81 mg by mouth daily. carvedilol 12.5 mg tablet Commonly known as:  COREG  
take 1 tablet by mouth twice a day  
  
 cholecalciferol 1,000 unit tablet Commonly known as:  VITAMIN D3 Take 2 Tabs by mouth daily. [Request refills from PCP (Dr. Ariana Chapman)] docusate sodium 100 mg capsule Commonly known as:  Landry Helton One capsule by mouth twice daily FIBER PO Take  by mouth. FISH OIL PO Take 3 Tabs by mouth daily. hydroCHLOROthiazide 12.5 mg capsule Commonly known as:  Sisto Lambing Take 1 Cap by mouth daily. Lactobacillus acidophilus Cap Commonly known as:  BACID Take 2 capsules by mouth two (2) times a day. losartan 50 mg tablet Commonly known as:  COZAAR Take 1 Tab by mouth daily. lovastatin 20 mg tablet Commonly known as:  MEVACOR Take 1 Tab by mouth daily. MULTIVITAMIN PO Take 1 Tab by mouth daily. PriLOSEC 20 mg capsule Generic drug:  omeprazole Take 20 mg by mouth daily as needed. rivaroxaban 15 mg Tab tablet Commonly known as:  Matt Looney Take 1 Tab by mouth daily. tolterodine 2 mg tablet Commonly known as:  So Kumart Take 1 Tab by mouth two (2) times a day. We Performed the Following ADMIN PNEUMOCOCCAL VACCINE [ \[facility] [place] [facility]\""] PNEUMOCOCCAL POLYSACCHARIDE VACCINE, 23-VALENT, ADULT OR IMMUNOSUPPRESSED PT DOSE, [94010 CPT(R)] To-Do List   
 Around 04/28/2017   Microbiology:  CULTURE, URINE   
  
 Around 04/28/2017 Lab:  URINALYSIS W/ RFLX MICROSCOPIC Around 08/21/2017 Lab:  LIPID PANEL Around 08/21/2017 Lab:  METABOLIC PANEL, COMPREHENSIVE Introducing [facility]! Ella Salters introduces [facility] patient portal. Now you can access parts of your medical record, email your doctor's office, and request medication refills online. 1. In your internet browser, go to [email or web address] 2. Click on the First Time User? Click Here link in the Sign In box. You will see the New Member Sign Up page. 3. Enter your [facility] Access Code exactly as it appears below. You will not need to use this code after youve completed the sign-up process. If you do not sign up before the expiration date, you must request a new code. · [facility] Access Code: [email or web address] Expires: 7/17/2017 10:36 AM 
 
4. Enter the last four digits of your Social Security Number (xxxx) and Date of Birth (mm/dd/yyyy) as indicated and click Submit. You will be taken to the next sign-up page. 5. Create a [facility] ID. This will be your [facility] login ID and cannot be changed, so think of one that is secure and easy to remember. 6. Create a [facility] password. You can change your password at any time. 7. Enter your Password Reset Question and Answer. This can be used at a later time if you forget your password. 8. Enter your e-mail address. You will receive e-mail notification when new information is available in [address] 9. Click Sign Up. You can now view and download portions of your medical record. 10. Click the Download Summary menu link to download a portable copy of your medical information. If you have questions, please visit the Frequently Asked Questions section of the [employer] website. Remember, [facility] is NOT to be used for urgent needs. For medical emergencies, dial 911. Now available from your iPhone and Android! Please provide this summary of care documentation to your next provider. Your primary care clinician is listed as Leonel Salazar. Francy Angela. If you have any questions after today's visit, please call [phone].

## 2017-04-28 NOTE — PROGRESS NOTES
Govind Hameed,born 12/29/1928, is a 80 y.o. female, who is seen today for reevaluation of hyperlipidemia hypertension atherosclerotic heart disease chronic atrial fibrillation and now complaining of hematuria. Her cardiologist talked to her at length about the importance of treating her atrial fibrillation with anticoagulant and that Xarelto would be much easier and safer for her particularly in view of her somewhat erratic diet. She notes that the day before starting Xarelto she did have some dysuria and starting the day she used Xarelto for the first time she had gross hematuria along with dysuria and frequency so she went to the emergency room that morning which was April 21. Urinalysis showed white cells too numerous to count and many red cells and she was treated with Cipro. She still has dysuria and every morning she uses Xarelto there is gross hematuria. No culture was done. She finished high-dose Cipro 3 days ago. She is having no chest pain or dyspnea. She is taking all of her medicine correctly including Xarelto despite seeing blood in her urine each day for a while after using Xarelto. Past Medical History:   Diagnosis Date    Allergic rhinitis     Anemia     Asymptomatic carotid bruit     asymptomatic    Atrial fibrillation ([facility] [address] 04/2017    Started on Xarelto    Cardiac cath 12/15/2010    pRCA 30%. LM patent. LAD 25%. CX 30%. LVEDP 10 mmHg. EF 60%. Mild AS.  Cardiac nuclear imaging test, low to mod risk 02/22/2016    Low to intermediate risk. Sm reversible inferoapical defect may be a sm area of ischemia. No prior infarction. EF >75%. Neg EKG on pharm stress test.    Carotid duplex 02/10/2014    Mild 1-49% bilateral ICA stenosis.      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 Lata Ho Hypertension     Lung collapse Dec 2011    Collapse of left upper lobe    Mild aortic stenosis 8/22/2011    Osteoarthritis     Osteoporosis     Positive PPD      Current Outpatient Prescriptions   Medication Sig Dispense Refill    rivaroxaban (XARELTO) 15 mg tab tablet Take 1 Tab by mouth daily. 30 Tab 6    losartan (COZAAR) 50 mg tablet Take 1 Tab by mouth daily. 30 Tab 6    amLODIPine (NORVASC) 10 mg tablet take 1 tablet by mouth daily 30 Tab 6    PSYLLIUM SEED, WITH DEXTROSE, (FIBER PO) Take  by mouth.  tolterodine (DETROL) 2 mg tablet Take 1 Tab by mouth two (2) times a day. 180 Tab 3    carvedilol (COREG) 12.5 mg tablet take 1 tablet by mouth twice a day 180 Tab 3    lovastatin (MEVACOR) 20 mg tablet Take 1 Tab by mouth daily. 90 Tab 3    hydrochlorothiazide (MICROZIDE) 12.5 mg capsule Take 1 Cap by mouth daily. 90 Cap 3    docusate sodium (COLACE) 100 mg capsule One capsule by mouth twice daily 60 Cap 3    omeprazole (PRILOSEC) 20 mg capsule Take 20 mg by mouth daily as needed.  naproxen sodium (ALEVE) 220 mg cap Take  by mouth.  lactobacillus acidophilus (BACID) cap Take 2 capsules by mouth two (2) times a day.  ascorbic acid (VITAMIN C) 250 mg tablet Take 1 Tab by mouth two (2) times daily (with meals). [Request refills from PCP (Dr. Luis Enrique Chapman)] 30 Tab 0    cholecalciferol (VITAMIN D3) 1,000 unit tablet Take 2 Tabs by mouth daily.  [Request refills from PCP (Dr. Luis Enrique Chapman)] 30 Tab 0    aspirin 81 mg tablet Take 81 mg by mouth daily.      MULTIVITAMIN PO Take 1 Tab by mouth daily.  DOCOSAHEXANOIC ACID/EPA (FISH OIL PO) Take 3 Tabs by mouth daily. Visit Vitals    /62    Pulse 71    Temp 97.7 °F (36.5 °C) (Oral)    Resp 12    Ht 5' 2\" (1.575 m)    Wt 125 lb 12.8 oz (57.1 kg)    SpO2 97%    BMI 23.01 kg/m2     Carotids are 2+ without bruits. Lungs are clear to percussion. Good breath sounds with no wheezing or crackles. Heart reveals an irregularly irregular rhythm with no murmur gallop click or rub. Apical impulse is not palpable. Abdomen is soft and nontender with no hepatosplenomegaly or masses and no bruits. Extremities reveal no clubbing cyanosis or edema. Pulses are intact throughout. Results for orders placed or performed during the hospital encounter of 04/25/17   VITAMIN D, 25 HYDROXY   Result Value Ref Range    Vitamin D 25-Hydroxy 43.1 30 - 100 ng/mL   T4, FREE   Result Value Ref Range    T4, Free 1.1 0.7 - 1.5 NG/DL   TSH 3RD GENERATION   Result Value Ref Range    TSH 1.17 0.36 - 3.74 uIU/mL   CBC WITH AUTOMATED DIFF   Result Value Ref Range    WBC 5.1 4.6 - 13.2 K/uL    RBC 3.91 (L) 4.20 - 5.30 M/uL    HGB 12.2 12.0 - 16.0 g/dL    HCT 38.5 35.0 - 45.0 %    MCV 98.5 (H) 74.0 - 97.0 FL    MCH 31.2 24.0 - 34.0 PG    MCHC 31.7 31.0 - 37.0 g/dL    RDW 13.7 11.6 - 14.5 %    PLATELET 027 003 - 755 K/uL    MPV 10.3 9.2 - 11.8 FL    NEUTROPHILS 54 40 - 73 %    LYMPHOCYTES 31 21 - 52 %    MONOCYTES 10 3 - 10 %    EOSINOPHILS 4 0 - 5 %    BASOPHILS 1 0 - 2 %    ABS. NEUTROPHILS 2.8 1.8 - 8.0 K/UL    ABS. LYMPHOCYTES 1.6 0.9 - 3.6 K/UL    ABS. MONOCYTES 0.5 0.05 - 1.2 K/UL    ABS. EOSINOPHILS 0.2 0.0 - 0.4 K/UL    ABS.  BASOPHILS 0.0 0.0 - 0.06 K/UL    DF AUTOMATED     METABOLIC PANEL, COMPREHENSIVE   Result Value Ref Range    Sodium 142 136 - 145 mmol/L    Potassium 4.3 3.5 - 5.5 mmol/L    Chloride 104 100 - 108 mmol/L    CO2 29 21 - 32 mmol/L    Anion gap 9 3.0 - 18 mmol/L    Glucose 85 74 - 99 mg/dL BUN 27 (H) 7.0 - 18 MG/DL    Creatinine 0.75 0.6 - 1.3 MG/DL    BUN/Creatinine ratio 36 (H) 12 - 20      GFR est AA >60 >60 ml/min/1.73m2    GFR est non-AA >60 >60 ml/min/1.73m2    Calcium 9.5 8.5 - 10.1 MG/DL    Bilirubin, total 0.8 0.2 - 1.0 MG/DL    ALT (SGPT) 77 (H) 13 - 56 U/L    AST (SGOT) 58 (H) 15 - 37 U/L    Alk. phosphatase 85 45 - 117 U/L    Protein, total 6.8 6.4 - 8.2 g/dL    Albumin 3.7 3.4 - 5.0 g/dL    Globulin 3.1 2.0 - 4.0 g/dL    A-G Ratio 1.2 0.8 - 1.7     LIPID PANEL   Result Value Ref Range    LIPID PROFILE          Cholesterol, total 135 <200 MG/DL    Triglyceride 40 <150 MG/DL    HDL Cholesterol 86 (H) 40 - 60 MG/DL    LDL, calculated 41 0 - 100 MG/DL    VLDL, calculated 8 MG/DL    CHOL/HDL Ratio 1.6 0 - 5.0       Assessment: #1. Hypertension controlled. She will continue hydrochlorothiazide 12.5 mg daily and amlodipine 10 mg daily. #2.  ASHD asymptomatic, she will continue aspirin 81 mg daily and carvedilol 12.5 mg twice daily. #3.  Chronic atrial fibrillation but currently with gross hematuria. Recommended she stop Xarelto for the next few days until urinary tract infection is adequately treated and then she should not see blood in her urine anymore. Try to convince her that Xarelto was not causing the bleeding but is accentuating bleeding that would be there anyway from earlier infection with inflammation of the urinary tract. #4. Hyperlipidemia doing well. She will continue lovastatin 20 mg each evening. #5.  ALT and AST are slightly elevated, that will be repeated in 4 months. She is having no symptoms referable to that laboratory findings so I do not think we need to check any sooner than 4 months. Follow-up in 4 months with lab    [facility] GLADIS Neves MD FACP    Please note: This document has been produced using voice recognition software. Unrecognized errors in transcription may be present.

## 2017-04-28 NOTE — PROGRESS NOTES
Patient in office today for ED follow up.  Admitted to [address] [place] [address], 4/21/17 to 4/21/17 for acute cystitis.

## 2017-04-30 LAB
BACTERIA SPEC CULT: NORMAL
SERVICE CMNT-IMP: NORMAL

## 2017-05-02 DIAGNOSIS — R31.0 GROSS HEMATURIA: Primary | ICD-10-CM

## 2017-05-02 NOTE — PROGRESS NOTES
I called the patient with her urinalysis results, she is growing only gram-positive contaminants, less than 3 white cells per high-power field but 100 red cells per high-power field and gross hematuria anytime she uses Xarelto. I told her she needs to see a urologist and I will arrange for an appointment.

## 2017-05-05 ENCOUNTER — TELEPHONE (OUTPATIENT)
Dept: CARDIOLOGY CLINIC | Age: 82
End: 2017-05-05

## 2017-05-08 NOTE — TELEPHONE ENCOUNTER
MD Bernard Frazier CNA        Cc: [facility]       Caller: Unspecified (3 days ago,  3:11 PM)                     Stop the Xaeralto.            Previous Messages          Called and left message for patient to call back.   Federica Villasenor MA

## 2017-05-09 PROBLEM — I48.0 PAROXYSMAL ATRIAL FIBRILLATION (HCC): Status: ACTIVE | Noted: 2017-05-09

## 2017-05-09 PROBLEM — E11.9 TYPE 2 DIABETES MELLITUS WITHOUT COMPLICATION (HCC): Status: ACTIVE | Noted: 2017-05-09

## 2017-05-09 PROBLEM — I10 ESSENTIAL HYPERTENSION: Status: ACTIVE | Noted: 2017-05-09

## 2017-05-09 PROBLEM — R31.0 GROSS HEMATURIA: Status: ACTIVE | Noted: 2017-05-09

## 2017-05-09 PROBLEM — N95.2 ATROPHIC VAGINITIS: Status: ACTIVE | Noted: 2017-05-09

## 2017-05-22 ENCOUNTER — OFFICE VISIT (OUTPATIENT)
Dept: PULMONOLOGY | Age: 82
End: 2017-05-22

## 2017-05-22 VITALS
HEART RATE: 84 BPM | BODY MASS INDEX: 23.19 KG/M2 | WEIGHT: 126 LBS | HEIGHT: 62 IN | OXYGEN SATURATION: 97 % | TEMPERATURE: 97.9 F | SYSTOLIC BLOOD PRESSURE: 134 MMHG | DIASTOLIC BLOOD PRESSURE: 70 MMHG | RESPIRATION RATE: 16 BRPM

## 2017-05-22 DIAGNOSIS — R91.8 PULMONARY INFILTRATE: Primary | ICD-10-CM

## 2017-05-22 DIAGNOSIS — J98.11 ATELECTASIS OF BOTH LUNGS: ICD-10-CM

## 2017-05-22 NOTE — MR AVS SNAPSHOT
Visit Information Date & Time Provider Department Dept. Phone Encounter #  
 5/22/2017  3:00 PM Fartun Mcgee MD [facility] [place] [phone] Follow-up Instructions Return in about 1 year (around 5/22/2018). Follow-up and Disposition History Your Appointments 7/5/2017 10:00 AM  
Follow Up with Camila Guthrie MD  
[facility] ([address] [place] [address] Road) Appt Note: 3 month f/up Nicole Hinds [phone]  
[phone] [address] [facility] [address] 8/18/2017  8:55 AM  
LAB with IOC NURSE VISIT [facility] ([address] [place] [address]) Appt Note: lab  
 [address], [address] [facility] [address] [place]  
  
   
 [address], [address]  
  
    
 8/31/2017 10:00 AM  
Office Visit with Shahla Hernandez MD  
Internist of [address] [place] [address] Appt Note: 4 month f/u  
 [address] [facility], [address] Ryan Roberts [address] [place]  
  
   
 [address], [address] Upcoming Health Maintenance Date Due  
 MEDICARE YEARLY EXAM 4/22/2017 DTaP/Tdap/Td series (1 - Tdap) 5/26/2017* INFLUENZA AGE 9 TO ADULT 8/1/2017 GLAUCOMA SCREENING Q2Y 2/6/2019 *Topic was postponed. The date shown is not the original due date. Allergies as of 5/22/2017  Review Complete On: 5/22/2017 By: Hiral Guevara LPN Severity Noted Reaction Type Reactions Bactrim [Sulfamethoprim Ds] High 02/11/2013   Systemic Nausea and Vomiting Vicodin [Hydrocodone-acetaminophen] Medium 10/03/2012   Side Effect Nausea Only Atenolol    Other (comments)  
 interolance Codeine    Other (comments)  
 headache Erythromycin    Unknown (comments) Other Medication    Not Reported This Time Refined sugar Pcn [Penicillins]    Hives Current Immunizations  Reviewed on 8/26/2016 Name Date Influenza High Dose Vaccine PF 8/26/2016 10:11 AM, 10/12/2015 11:23 AM, 9/10/2014  3:33 PM  
 Influenza Vaccine 10/24/2013 Influenza Vaccine Split 10/28/2011 Influenza Vaccine Whole 10/25/2010 Pneumococcal Conjugate (PCV-13) 4/21/2016 10:28 AM  
 Pneumococcal Polysaccharide (PPSV-23) 4/28/2017  9:07 AM  
 TB Skin Test (PPD) 1/1/1992 Not reviewed this visit You Were Diagnosed With   
  
 Codes Comments Pulmonary infiltrate    -  Primary ICD-10-CM: R91.8 ICD-9-CM: 793.19 Atelectasis of both lungs     ICD-10-CM: J98.11 ICD-9-CM: 518.0 Vitals BP Pulse Temp Resp Height(growth percentile) Weight(growth percentile) 134/70 (BP 1 Location: Left arm, BP Patient Position: Sitting) 84 97.9 °F (36.6 °C) (Oral) 16 5' 2\" (1.575 m) 126 lb (57.2 kg) SpO2 BMI OB Status Smoking Status 97% 23.05 kg/m2 Hysterectomy Never Smoker Vitals History BMI and BSA Data Body Mass Index Body Surface Area 23.05 kg/m 2 1.58 m 2 Preferred Pharmacy Pharmacy Name Phone [facility], [address] [place] [address] [phone] Your Updated Medication List  
  
   
This list is accurate as of: 5/22/17  3:52 PM.  Always use your most recent med list.  
  
  
  
  
 ALEVE 220 mg Cap Generic drug:  naproxen sodium Take  by mouth. amLODIPine 10 mg tablet Commonly known as:  NORVASC  
take 1 tablet by mouth daily  
  
 ascorbic acid (vitamin C) 250 mg tablet Commonly known as:  VITAMIN C Take 1 Tab by mouth two (2) times daily (with meals). [Request refills from PCP (Dr. Leida Chapman)] aspirin 81 mg tablet Take 81 mg by mouth daily. carvedilol 12.5 mg tablet Commonly known as:  COREG  
take 1 tablet by mouth twice a day  
  
 cholecalciferol 1,000 unit tablet Commonly known as:  VITAMIN D3  
 Take 2 Tabs by mouth daily. [Request refills from PCP (Dr. Gilberto Chapman)] docusate sodium 100 mg capsule Commonly known as:  Brayan Cost One capsule by mouth twice daily FIBER PO Take  by mouth. FISH OIL PO Take 3 Tabs by mouth daily. hydroCHLOROthiazide 12.5 mg capsule Commonly known as:  Rod Zelalem Take 1 Cap by mouth daily. Lactobacillus acidophilus Cap Commonly known as:  BACID Take 2 capsules by mouth two (2) times a day. losartan 50 mg tablet Commonly known as:  COZAAR Take 1 Tab by mouth daily. lovastatin 20 mg tablet Commonly known as:  MEVACOR Take 1 Tab by mouth daily. MULTIVITAMIN PO Take 1 Tab by mouth daily. OTHER Take 1 Tab by mouth daily as needed. Mely Left PriLOSEC 20 mg capsule Generic drug:  omeprazole Take 20 mg by mouth daily as needed. rivaroxaban 15 mg Tab tablet Commonly known as:  Chely Better Take 1 Tab by mouth daily. tolterodine 2 mg tablet Commonly known as:  Seferino Sick Take 1 Tab by mouth two (2) times a day. We Performed the Following AMB POC XRAY, CHEST, 2 VIEWS, FRONTAL [86298 CPT(R)] Follow-up Instructions Return in about 1 year (around 5/22/2018). Patient Instructions Call for symptoms such as worsening shortness of breath or a persisting cough Introducing [facility]! [employer] introduces [employer] patient portal. Now you can access parts of your medical record, email your doctor's office, and request medication refills online. 1. In your internet browser, go to [email or web address] 2. Click on the First Time User? Click Here link in the Sign In box. You will see the New Member Sign Up page. 3. Enter your Clear[employer] Access Code exactly as it appears below. You will not need to use this code after youve completed the sign-up process. If you do not sign up before the expiration date, you must request a new code. · [employer] Access Code: [email or web address] Expires: 7/17/2017 10:36 AM 
 
4. Enter the last four digits of your Social Security Number (xxxx) and Date of Birth (mm/dd/yyyy) as indicated and click Submit. You will be taken to the next sign-up page. 5. Create a [employer] ID. This will be your [employer] login ID and cannot be changed, so think of one that is secure and easy to remember. 6. Create a [employer] password. You can change your password at any time. 7. Enter your Password Reset Question and Answer. This can be used at a later time if you forget your password. 8. Enter your e-mail address. You will receive e-mail notification when new information is available in [address] 9. Click Sign Up. You can now view and download portions of your medical record. 10. Click the Download Summary menu link to download a portable copy of your medical information. If you have questions, please visit the Frequently Asked Questions section of the [employer] website. Remember, [employer] is NOT to be used for urgent needs. For medical emergencies, dial 911. Now available from your iPhone and Android! Please provide this summary of care documentation to your next provider. Your primary care clinician is listed as Fatou Giles. Shara Dawkins. If you have any questions after today's visit, please call [phone].

## 2017-05-22 NOTE — LETTER
Request for Examination Exam Date: 2017 Patient's Name:  Jae Galeas SS#:  [id number] :  [id number] Pregnant: No 
 
Address:  [facility] [place] [address] Phone#:  [phone] (home) Ordering Physician: Irais Mirza. Jihan Fuller MD 
 
Technician: Hannah Phipps LPN Insurance Information: See Attached Exams Ordered: CXR: PA & LAT Clinical Data/ Brief History: Atelectasis J98.11, Pulm. Infiltrate R91.8 Preliminary Exam Report: 
 
 
 
 
 
 
 
 
___________________________  __________________ ___________ Radiologist                  Date         Time

## 2017-05-22 NOTE — PROGRESS NOTES
[facility]  Pulmonary, Critical Care, and Sleep Medicine      Chief complaint:  Abnormal chest x-ray appearance    HPI:  Stacey Kan is 80years old returns to office today for followup concerning abnormal chest x-ray appearance and relates she has a minimal cough usually when she is short of breath. She says her shortness of breath is stable for the most part she is able to do shopping and housework without significant shortness of breath when she feels palpitations in her chest she notes shortness of breath and this is episodic and denies any chest pain except on one instance when she had severe pain radiating down her arms and upper jaw which lasted for 20 min. And which did not recur. She also relates she has no leg swelling  Allergies   Allergen Reactions    Bactrim [Sulfamethoprim Ds] Nausea and Vomiting    Vicodin [Hydrocodone-Acetaminophen] Nausea Only    Atenolol Other (comments)     interolance    Codeine Other (comments)     headache    Erythromycin Unknown (comments)    Other Medication Not Reported This Time     Refined sugar    Pcn [Penicillins] Hives     Current Outpatient Prescriptions   Medication Sig    OTHER Take 1 Tab by mouth daily as needed. allevert    losartan (COZAAR) 50 mg tablet Take 1 Tab by mouth daily.  amLODIPine (NORVASC) 10 mg tablet take 1 tablet by mouth daily    PSYLLIUM SEED, WITH DEXTROSE, (FIBER PO) Take  by mouth.  tolterodine (DETROL) 2 mg tablet Take 1 Tab by mouth two (2) times a day.  carvedilol (COREG) 12.5 mg tablet take 1 tablet by mouth twice a day    lovastatin (MEVACOR) 20 mg tablet Take 1 Tab by mouth daily.  hydrochlorothiazide (MICROZIDE) 12.5 mg capsule Take 1 Cap by mouth daily.  docusate sodium (COLACE) 100 mg capsule One capsule by mouth twice daily    omeprazole (PRILOSEC) 20 mg capsule Take 20 mg by mouth daily as needed.  naproxen sodium (ALEVE) 220 mg cap Take  by mouth.     lactobacillus acidophilus (BACID) cap Take 2 capsules by mouth two (2) times a day.  ascorbic acid (VITAMIN C) 250 mg tablet Take 1 Tab by mouth two (2) times daily (with meals). [Request refills from PCP (Dr. Shankar Chapman)]    cholecalciferol (VITAMIN D3) 1,000 unit tablet Take 2 Tabs by mouth daily. [Request refills from PCP (Dr. Shankar Chapman)]    aspirin 81 mg tablet Take 81 mg by mouth daily.  MULTIVITAMIN PO Take 1 Tab by mouth daily.  DOCOSAHEXANOIC ACID/EPA (FISH OIL PO) Take 3 Tabs by mouth daily.  rivaroxaban (XARELTO) 15 mg tab tablet Take 1 Tab by mouth daily. No current facility-administered medications for this visit. Past Medical History:   Diagnosis Date    Allergic rhinitis     Anemia     Asymptomatic carotid bruit     asymptomatic    Atrial fibrillation ([facility] [address] 04/2017    Started on Xarelto    Cardiac cath 12/15/2010    pRCA 30%. LM patent. LAD 25%. CX 30%. LVEDP 10 mmHg. EF 60%. Mild AS.  Cardiac nuclear imaging test, low to mod risk 02/22/2016    Low to intermediate risk. Sm reversible inferoapical defect may be a sm area of ischemia. No prior infarction. EF >75%. Neg EKG on pharm stress test.    Carotid duplex 02/10/2014    Mild 1-49% bilateral ICA stenosis.      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 Villafana Cage Hypertension     Lung collapse Dec 2011 Collapse of left upper lobe    Mild aortic stenosis 8/22/2011    Osteoarthritis     Osteoporosis     Positive PPD      Past Surgical History:   Procedure Laterality Date    HX CATARACT REMOVAL      HX COLONOSCOPY      HX HEART CATHETERIZATION  12/15/10    negative    HX HYSTERECTOMY      HX KNEE ARTHROSCOPY      HX PTCA  3/1995    of the LAD; has had no recurrent angina since    HX TONSILLECTOMY      IN BRONCHOSCOPY BRONCHIAL/ENDOBRNCL BX 1+ SITES  9/25/2012          Social History     Social History    Marital status:      Spouse name: N/A    Number of children: N/A    Years of education: N/A     Occupational History    Not on file.      Social History Main Topics    Smoking status: Never Smoker    Smokeless tobacco: Never Used      Comment: extensive secondhand smoke exposure through job    Alcohol use No    Drug use: No    Sexual activity: No     Other Topics Concern    Not on file     Social History Narrative     Family History   Problem Relation Age of Onset    Heart Disease Mother      History of CHF    Heart Disease Father     Heart Attack Father     Heart Disease Sister     Cancer Brother      non-Hodgkin lymphoma    Cancer Brother      liver cancer    Heart Disease Brother     Stroke Brother     Parkinsonism Sister     Parkinsonism Brother        Review of systems:  She denies fever chills poor appetite or weight loss    Physical Exam:  Visit Vitals    /70 (BP 1 Location: Left arm, BP Patient Position: Sitting)    Pulse 84  Comment: irregular    Temp 97.9 °F (36.6 °C) (Oral)    Resp 16    Ht 5' 2\" (1.575 m)    Wt 57.2 kg (126 lb)    SpO2 97%    BMI 23.05 kg/m2       Well-developed elderly  HEENT: WNL  Lymph node exam: Supraclavicular cervical lymph nodes negative  Chest: Equal symmetrical expansion no dullness no wheezes rales rubs with slightly decreased breath sounds in the left upper lung field  Heart: Irregular rhythm no gallop or murmur no JVD no peripheral edema  Extremities: No cyanosis or clubbing  Neurological: Alert and oriented    LABS:    O2 sat room air at rest 97%  Chest x-ray 5/22/70 with a left perihilar mass abutting her aorta and a shift of the mediastinal structures to the left indicating atelectasis which is stable no apparent change from 5/26/16    Impression:   Abnormal chest x-ray appearance with atelectasis but clinically and by chest x-ray stable  Atrial fibrillation and symptoms of palpitations shortness of breath and on one occasion chest pain which suggest a cardiac etiology    Plan:  followup in one year or sooner if needed with a chest x-ray    Samuel Eden MD , [facility]    CC: Jennifer Edwards MD           [facility], [address] [place] [address],[address]  [place], [address] [facility] [address]     P: [phone]     F: [phone]

## 2017-05-24 ENCOUNTER — PATIENT OUTREACH (OUTPATIENT)
Dept: INTERNAL MEDICINE CLINIC | Age: 82
End: 2017-05-24

## 2017-05-24 NOTE — PROGRESS NOTES
Episode closed:   Patient admitted to [address] [place] [address], 4/21/17 to 4/21/17 for acute cystitis. No hospitalization or ED admission post 30 days from discharge of 4/21/17.

## 2017-06-13 PROBLEM — N36.2 URETHRAL CARUNCLE: Status: ACTIVE | Noted: 2017-06-13

## 2017-06-13 PROBLEM — Z87.898 HISTORY OF GROSS HEMATURIA: Status: ACTIVE | Noted: 2017-06-13

## 2017-06-19 RX ORDER — HYDROCHLOROTHIAZIDE 12.5 MG/1
CAPSULE ORAL
Qty: 90 CAP | Refills: 3 | Status: SHIPPED | OUTPATIENT
Start: 2017-06-19 | End: 2018-07-16 | Stop reason: SDUPTHER

## 2017-07-05 ENCOUNTER — OFFICE VISIT (OUTPATIENT)
Dept: CARDIOLOGY CLINIC | Age: 82
End: 2017-07-05

## 2017-07-05 VITALS
WEIGHT: 124 LBS | OXYGEN SATURATION: 98 % | BODY MASS INDEX: 22.82 KG/M2 | HEART RATE: 91 BPM | DIASTOLIC BLOOD PRESSURE: 70 MMHG | SYSTOLIC BLOOD PRESSURE: 128 MMHG | HEIGHT: 62 IN

## 2017-07-05 DIAGNOSIS — I25.10 ATHEROSCLEROSIS OF NATIVE CORONARY ARTERY OF NATIVE HEART WITHOUT ANGINA PECTORIS: Chronic | ICD-10-CM

## 2017-07-05 DIAGNOSIS — I35.0 MILD AORTIC STENOSIS: Chronic | ICD-10-CM

## 2017-07-05 DIAGNOSIS — I10 ESSENTIAL HYPERTENSION: Chronic | ICD-10-CM

## 2017-07-05 DIAGNOSIS — I48.20 CHRONIC ATRIAL FIBRILLATION (HCC): Primary | ICD-10-CM

## 2017-07-05 NOTE — PROGRESS NOTES
HISTORY OF PRESENT ILLNESS  Rachna Pacheco is a 80 y.o. female. HPI  She has been feeling reasonably well. She is no longer experiencing hematuria and she was advised to go back on the Xarelto by her urologist.  She does have dyspnea on exertion, which has not changed much. She indicates that her pulmonologist, Dr. Swapna Tang, told her to slow down. She has had very little palpitations from her atrial fibrillation. She denies chest pain, resting dyspnea, orthopnea or PND. She has had no dizziness or syncope. She denies any symptoms to indicate TIA or amaurosis fugax. Her H & H have been fine and her renal function has been adequate. She has a history of successful PTCA of the LAD in March 1995 and has had no recurrent angina ever since. She had a repeat cardiac catheterization in March 1995 for chest pain, which revealed no evidence of restenosis . Her noninvasive carotid studies on March 19, 2007, demonstrated 50% stenosis of the distal right internal carotid artery, which was not severe enough to warrant surgical intervention. She underwent stress nuclear cardiac imaging on May 11, 2009, which was a normal imaging, with no evidence of scarring or ischemia and no interval change in comparison to the study of March 2007.    Because of the continued chest pain despite the negative stress and EKG and cardiac imaging, she underwent the cardiac catheterization on 12/15/2010, which was reviewed.    1. Patient dominant RCA with the 30% of proximal stenosis. 2. Patent left main trunk. .  3. Patient LAD with the diffuse 20 to 30 % narrowing throughout. 4. Patient circumflex artery with diffuse 30% stenosis in the mid segment. 5. Normal left ventricular systolic function with EF in the 60% range. 6. Mild aortic stenosis with the pressure gradient of 10 to 20 mmHg on pullback.   It was felt that her chest pain was noncardiac in nature, and the continued medical treatment was recommended.    She was admitted to [facility] on 12/4/11 with hemoptysis. She was evaluated by endoscopy examination and found to have a hiatal hernia but no source of bleeding. She was subsequently evaluated by pulmonology and was found to have obstructive lesion of left upper lobe. A bronchoscopy was done on 12/5/11 which demonstrated obstructive upper left lobe with hemorrhage of entire bronchial tree suggestive of hemoptysis rather than hematemesis from the GI system. 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 She has been under the care of Dr. Giovany Penny for hemoptysis and abnormal CT scan and chest X-ray but no decision has been made thus far. She underwent endoscopic examination on 10/21/13 and was found to have gastric ulcers, which were treated with Nexium. She is now just taking Prilosec occasionally. She has had stress nuclear cardiac imaging on 2/10/14, which demonstrated normal perfusion with no evidence of scarring or ischemia. The ejection fraction was in the 80% range. She underwent noninvasive carotid study to evaluate asymptomatic carotid bruit on 2/10/14, which demonstrated bilateral 1-49% stenosis of the internal carotid arteries, which was felt to be not severe enough to cause any symptoms or warrant treatment.    She continues to be followed by a pulmonologist for some mass like lesion in the lungs by chest X-ray and CT scan, but it has been negative for malignancy thus far. She has had occasional hemoptysis and it is not clear if it ever has been related to the lung lesions. She is now under the care of Dr. Brandie Cruz, as Dr. Giovany Penny has retired.    She underwent stress nuclear cardiac imaging on 02/22/16, which demonstrated a very small focal area of mild ischemia in the inferior apex. LV function was normal with EF in the 75% range.    For the evaluation of her chest pain, she underwent stress nuclear cardiac imaging on 03/02/2017, which demonstrated normal perfusion with no evidence of scarring or ischemia. Ejection fraction was greater than 70%. She underwent a Holter monitor on 03/06/2017 to evaluate the heart rate response, which demonstrated baseline atrial fibrillation with a minimum heart rate of 58 and a maximum heart rate of 141 with an average heart rate of 88. There were no significant pauses. Her BUN/creatinine were 36 and 0.81 on 02/28/2017. Review of Systems   Constitutional: Negative for malaise/fatigue and weight loss. HENT: Negative for hearing loss. Eyes: Negative for blurred vision and double vision. Respiratory: Positive for shortness of breath. Cardiovascular: Positive for palpitations. Negative for chest pain, orthopnea, claudication, leg swelling and PND. Gastrointestinal: Negative for blood in stool, heartburn and melena. Genitourinary: Positive for frequency and hematuria. Negative for urgency. Musculoskeletal: Negative for back pain and joint pain. Skin: Negative for itching and rash. Neurological: Negative for dizziness, loss of consciousness, weakness and headaches. Psychiatric/Behavioral: Negative for depression and memory loss. Physical Exam   Constitutional: She is oriented to person, place, and time. She appears well-developed and well-nourished. HENT:   Head: Normocephalic and atraumatic. Eyes: Conjunctivae are normal. Pupils are equal, round, and reactive to light. Neck: Normal range of motion. Neck supple. No JVD present. Cardiovascular: Normal rate, S1 normal and S2 normal.  An irregularly irregular rhythm present. No extrasystoles are present. PMI is not displaced. Exam reveals no gallop and no friction rub. Murmur heard.    Harsh early systolic murmur is present with a grade of 1/6  at the upper right sternal border radiating to the neck  Pulses:       Carotid pulses are 3+ on the right side with bruit, and 3+ on the left side with bruit. Pulmonary/Chest: Effort normal. She has no rales. Abdominal: Soft. There is no tenderness. Musculoskeletal: She exhibits no edema. Neurological: She is alert and oriented to person, place, and time. No cranial nerve deficit. Skin: Skin is warm and dry. Psychiatric: She has a normal mood and affect. Her behavior is normal.     Visit Vitals    /70    Pulse 91    Ht 5' 2\" (1.575 m)    Wt 56.2 kg (124 lb)    SpO2 98%    BMI 22.68 kg/m2       Past Medical History:   Diagnosis Date    Allergic rhinitis     Anemia     Asymptomatic carotid bruit     asymptomatic    Atrial fibrillation ([facility] [address] 04/2017    Started on Xarelto    Atrial fibrillation ([facility] [address]     Cardiac cath 12/15/2010    pRCA 30%. LM patent. LAD 25%. CX 30%. LVEDP 10 mmHg. EF 60%. Mild AS.  Cardiac nuclear imaging test, low to mod risk 02/22/2016    Low to intermediate risk. Sm reversible inferoapical defect may be a sm area of ischemia. No prior infarction. EF >75%. Neg EKG on pharm stress test.    Carotid duplex 02/10/2014    Mild 1-49% bilateral ICA stenosis.       Chronic anemia     Chronic kidney disease     CKD (chronic kidney disease) stage 2, GFR 60-89 ml/min     Closed bilateral fracture of pubic rami (HCC) 5/15/2014    Acute pubic bone fracture on both sides of the symphysis pubis    Coronary artery disease     Status post PTCA of the LAD in March 1995/ EF 70%    Coronary artery disease     Diabetes mellitus ([facility] [address]     has had elevated BS from time to time    Dyslipidemia     Dysphagia     Gastroesophageal reflux disease     Gross hematuria     Heart problem     Histoplasmosis Oct 2012    With probable bronchiectasis and localized AV malformations at left upper lobe with recurrent hemoptysis    History of peptic ulcer     Dr. Seth Ast Hypertension     Lung collapse Dec 2011    Collapse of left upper lobe    Mild aortic stenosis 8/22/2011    Osteoarthritis     Osteoporosis  Positive PPD        Social History     Social History    Marital status:      Spouse name: N/A    Number of children: N/A    Years of education: N/A     Occupational History    Not on file. Social History Main Topics    Smoking status: Never Smoker    Smokeless tobacco: Never Used      Comment: extensive secondhand smoke exposure through job    Alcohol use No    Drug use: No    Sexual activity: No     Other Topics Concern    Not on file     Social History Narrative    ** Merged History Encounter **            Family History   Problem Relation Age of Onset    Heart Disease Mother      History of CHF    Heart Disease Father     Heart Attack Father     Heart Disease Sister     Cancer Brother      non-Hodgkin lymphoma    Cancer Brother      liver cancer    Heart Disease Brother     Stroke Brother     Parkinsonism Sister     Parkinsonism Brother        Past Surgical History:   Procedure Laterality Date    HX CATARACT REMOVAL      HX COLONOSCOPY      HX HEART CATHETERIZATION  12/15/10    negative    HX HYSTERECTOMY      HX KNEE ARTHROSCOPY      HX PTCA  3/1995    of the LAD; has had no recurrent angina since    HX TONSILLECTOMY      ME BRONCHOSCOPY BRONCHIAL/ENDOBRNCL BX 1+ SITES  9/25/2012            Current Outpatient Prescriptions   Medication Sig Dispense Refill    hydroCHLOROthiazide (MICROZIDE) 12.5 mg capsule take 1 capsule by mouth once daily 90 Cap 3    OTHER Take 1 Tab by mouth daily as needed. allevert      losartan (COZAAR) 50 mg tablet Take  by mouth daily.  hydroCHLOROthiazide (HYDRODIURIL) 12.5 mg tablet Take 12.5 mg by mouth daily.  tolterodine (DETROL) 2 mg tablet Take 2 mg by mouth two (2) times a day.  Omega-3 Fatty Acids (FISH OIL) 300 mg cap Take  by mouth.  ascorbic acid, vitamin C, (VITAMIN C) 500 mg tablet Take  by mouth.  multivitamin (ONE A DAY) tablet Take 1 Tab by mouth daily.       METHYLCELLULOSE (FIBER THERAPY) by Does Not Apply route.  Lactobacillus acidophilus (ACIDOPHILUS) cap Take 2 Caps by mouth two (2) times a day.  naproxen sodium (ALEVE) 220 mg cap Take  by mouth.  losartan (COZAAR) 50 mg tablet Take 1 Tab by mouth daily. 30 Tab 6    amLODIPine (NORVASC) 10 mg tablet take 1 tablet by mouth daily 30 Tab 6    PSYLLIUM SEED, WITH DEXTROSE, (FIBER PO) Take  by mouth.  tolterodine (DETROL) 2 mg tablet Take 1 Tab by mouth two (2) times a day. 180 Tab 3    carvedilol (COREG) 12.5 mg tablet take 1 tablet by mouth twice a day 180 Tab 3    lovastatin (MEVACOR) 20 mg tablet Take 1 Tab by mouth daily. 90 Tab 3    docusate sodium (COLACE) 100 mg capsule One capsule by mouth twice daily 60 Cap 3    omeprazole (PRILOSEC) 20 mg capsule Take 20 mg by mouth daily as needed.  naproxen sodium (ALEVE) 220 mg cap Take  by mouth.  lactobacillus acidophilus (BACID) cap Take 2 capsules by mouth two (2) times a day.  ascorbic acid (VITAMIN C) 250 mg tablet Take 1 Tab by mouth two (2) times daily (with meals). [Request refills from PCP ( [address])] 30 Tab 0    cholecalciferol (VITAMIN D3) 1,000 unit tablet Take 2 Tabs by mouth daily. [Request refills from PCP ( [address])] 30 Tab 0    aspirin 81 mg tablet Take 81 mg by mouth daily.  MULTIVITAMIN PO Take 1 Tab by mouth daily.  DOCOSAHEXANOIC ACID/EPA (FISH OIL PO) Take 3 Tabs by mouth daily. EKG: unchanged from previous tracings, atrial fibrillation, rate controlled  . ASSESSMENT and PLAN  Encounter Diagnoses   Name Primary?  Chronic atrial fibrillation (HCC) Yes    Coronary artery disease, status post PTCA of LAD in March 1995/EF 70%.  Mild aortic stenosis     Essential hypertension    She has been doing reasonably well. Shed is relatively asymptomatic from the atrial fibrillation.   She is no longer having issues with hematuria and has been advised that she could go back on the anticoagulation by her urologist.  She looks quite good and active for her age of [de-identified], therefore, I would start her back on the reduced dose of Xarelto and wait and see how she does in terms of the hematuria. Her dyspnea on exertion seems to be multifactorial, including left ventricular diastolic dysfunction secondary to age and hypertension and also chronic pulmonary disease. I would recommend that she continue to walk and be active to improve her dyspnea on exertion rather than slowing down, if possible.

## 2017-07-05 NOTE — PROGRESS NOTES
1. Have you been to the ER, urgent care clinic since your last visit? Hospitalized since your last visit? No  2. Have you seen or consulted any other health care providers outside of the [facility] since your last visit? Include any pap smears or colon screening.   no

## 2017-07-05 NOTE — MR AVS SNAPSHOT
Visit Information Date & Time Provider Department Dept. Phone Encounter #  
 7/5/2017 10:00 AM Ale Calderón MD [facility] [address] [id number] Your Appointments 8/18/2017  8:55 AM  
LAB with [facility] NURSE VISIT [facility] ([facility]) Appt Note: lab  
 [address], [address] [facility] [address]  
  
   
 [address], [address]  
  
    
 8/31/2017 10:00 AM  
Office Visit with Loretta Simms MD  
[facility] Appt Note: 4 month f/u  
 [address] [facility], [address] [address] [address]  
  
   
 [address], [address]  
  
    
 1/10/2018 11:00 AM  
Follow Up with Ale Calderón MD  
[facility] ([facility] Appt Note: F/U 6 months [place] [address] [address] [address]  
[phone] [address] [facility] [address] [address]  
  
    
  
 8/7/2017  2:15 PM  
Any with Shahla Vital MD  
[facility] ([facility]) Appt Note: Return in about 2 months (around 8/13/2017). [address] [facility] [address]  
[address] [facility] [address] [facility] [address] [facility] [address] Upcoming Health Maintenance Date Due HEMOGLOBIN A1C Q6M 12/29/1928 FOOT EXAM Q1 12/29/1938 MICROALBUMIN Q1 12/29/1938 EYE EXAM RETINAL OR DILATED Q1 12/29/1938 DTaP/Tdap/Td series (1 - Tdap) 12/29/1949 MEDICARE YEARLY EXAM 4/22/2017 INFLUENZA AGE 9 TO ADULT 8/1/2017 LIPID PANEL Q1 4/25/2018 GLAUCOMA SCREENING Q2Y 2/6/2019 Allergies as of 7/5/2017  Review Complete On: 7/5/2017 By: Ale Calderón MD  
  
 Severity Noted Reaction Type Reactions Bactrim [Sulfamethoprim Ds] High 02/11/2013   Systemic Nausea and Vomiting Vicodin [Hydrocodone-acetaminophen] Medium 10/03/2012   Side Effect Nausea Only Atenolol    Other (comments)  
 interolance Atenolol  05/09/2017    Other (comments) Black out Codeine  05/09/2017    Other (comments)  
 headache Erythromycin    Unknown (comments) Erythromycin  05/09/2017    Hives, Itching Other Medication    Not Reported This Time Refined sugar Pcn [Penicillins]    Hives Penicillin G  05/09/2017    Hives, Itching Current Immunizations  Reviewed on 8/26/2016 Name Date Influenza High Dose Vaccine PF 8/26/2016 10:11 AM, 10/12/2015 11:23 AM, 9/10/2014  3:33 PM  
 Influenza Vaccine 10/24/2013 Influenza Vaccine Split 10/28/2011 Influenza Vaccine Whole 10/25/2010 Pneumococcal Conjugate (PCV-13) 4/21/2016 10:28 AM  
 Pneumococcal Polysaccharide (PPSV-23) 4/28/2017  9:07 AM  
 TB Skin Test (PPD) 1/1/1992 Not reviewed this visit You Were Diagnosed With   
  
 Codes Comments Chronic atrial fibrillation (HCC)    -  Primary ICD-10-CM: T17.3 ICD-9-CM: 427.31 Atherosclerosis of native coronary artery of native heart without angina pectoris     ICD-10-CM: I25.10 ICD-9-CM: 414.01 Mild aortic stenosis     ICD-10-CM: I35.0 ICD-9-CM: 424.1 Essential hypertension     ICD-10-CM: I10 
ICD-9-CM: 401.9 Vitals BP Pulse Height(growth percentile) Weight(growth percentile) SpO2 BMI  
 128/70 91 5' 2\" (1.575 m) 124 lb (56.2 kg) 98% 22.68 kg/m2 OB Status Smoking Status Hysterectomy Never Smoker Vitals History BMI and BSA Data Body Mass Index Body Surface Area  
 22.68 kg/m 2 1.57 m 2 Preferred Pharmacy Pharmacy Name Phone [facility], [address] [phone] Your Updated Medication List  
  
   
This list is accurate as of: 7/5/17 10:52 AM.  Always use your most recent med list.  
  
  
  
  
 ACIDOPHILUS Cap Generic drug:  Lactobacillus acidophilus Take 2 Caps by mouth two (2) times a day. ALEVE 220 mg Cap Generic drug:  naproxen sodium Take  by mouth. amLODIPine 10 mg tablet Commonly known as:  NORVASC  
take 1 tablet by mouth daily  
  
 ascorbic acid (vitamin C) 250 mg tablet Commonly known as:  VITAMIN C Take 1 Tab by mouth two (2) times daily (with meals). [Request refills from PCP (Dr. Kathryn Holstein MacKinnon)] aspirin 81 mg tablet Take 81 mg by mouth daily. carvedilol 12.5 mg tablet Commonly known as:  COREG  
take 1 tablet by mouth twice a day  
  
 cholecalciferol 1,000 unit tablet Commonly known as:  VITAMIN D3 Take 2 Tabs by mouth daily. [Request refills from PCP (Dr. Kathryn Holstein MacKinnon)] docusate sodium 100 mg capsule Commonly known as:  Idella Razor One capsule by mouth twice daily FIBER THERAPY  
by Does Not Apply route. FISH OIL PO Take 3 Tabs by mouth daily. hydroCHLOROthiazide 12.5 mg capsule Commonly known as:  MICROZIDE  
take 1 capsule by mouth once daily  
  
 losartan 50 mg tablet Commonly known as:  COZAAR Take 1 Tab by mouth daily. lovastatin 20 mg tablet Commonly known as:  MEVACOR Take 1 Tab by mouth daily. multivitamin tablet Commonly known as:  ONE A DAY Take 1 Tab by mouth daily. PriLOSEC 20 mg capsule Generic drug:  omeprazole Take 20 mg by mouth daily as needed. tolterodine 2 mg tablet Commonly known as:  Renan Swan Take 1 Tab by mouth two (2) times a day. We Performed the Following AMB POC EKG ROUTINE W/ 12 LEADS, INTER & REP [24113 CPT(R)] Patient Instructions Xarelto 15mg daily Introducing [facility]! Haroon Pruett introduces [facility] patient portal. Now you can access parts of your medical record, email your doctor's office, and request medication refills online. 1. In your internet browser, go to [email or web address] 2. Click on the First Time User? Click Here link in the Sign In box. You will see the New Member Sign Up page. 3. Enter your [employer] Access Code exactly as it appears below. You will not need to use this code after youve completed the sign-up process. If you do not sign up before the expiration date, you must request a new code. · [employer] Access Code: [email or web address] Expires: 8/7/2017  4:09 PM 
 
4. Enter the last four digits of your Social Security Number (xxxx) and Date of Birth (mm/dd/yyyy) as indicated and click Submit. You will be taken to the next sign-up page. 5. Create a [employer] ID. This will be your [employer] login ID and cannot be changed, so think of one that is secure and easy to remember. 6. Create a [employer] password. You can change your password at any time. 7. Enter your Password Reset Question and Answer. This can be used at a later time if you forget your password. 8. Enter your e-mail address. You will receive e-mail notification when new information is available in [address] 9. Click Sign Up. You can now view and download portions of your medical record. 10. Click the Download Summary menu link to download a portable copy of your medical information. If you have questions, please visit the Frequently Asked Questions section of the [employer] website. Remember, [employer] is NOT to be used for urgent needs. For medical emergencies, dial 911. Now available from your iPhone and Android! Please provide this summary of care documentation to your next provider. Your primary care clinician is listed as Landry Alvarez. If you have any questions after today's visit, please call [phone].

## 2017-07-31 ENCOUNTER — TELEPHONE (OUTPATIENT)
Dept: CARDIOLOGY CLINIC | Age: 82
End: 2017-07-31

## 2017-07-31 NOTE — TELEPHONE ENCOUNTER
Patient called this morning with concerns of blood being in her urine. She restarted Xarelto on 7/5/17 at her last office visit with Dr. Kiki Addison. She said the blood in her urine last until about 10:30 at night. She also has an upcoming appointment with her Urologist, Dr. Vania Gonzalez on 8/7/17. Will go over with Dr. Kiki Addison to get any instructions.

## 2017-07-31 NOTE — TELEPHONE ENCOUNTER
Per. Dr. Bridger Carter, pt is to stop Xarelto completely. He did not to try her on any other blood thinner at this time.

## 2017-08-18 ENCOUNTER — LAB ONLY (OUTPATIENT)
Dept: INTERNAL MEDICINE CLINIC | Age: 82
End: 2017-08-18

## 2017-08-18 ENCOUNTER — HOSPITAL ENCOUNTER (OUTPATIENT)
Dept: LAB | Age: 82
Discharge: HOME | End: 2017-08-18
Payer: MEDICARE

## 2017-08-18 DIAGNOSIS — E11.9 TYPE 2 DIABETES MELLITUS WITHOUT COMPLICATION, UNSPECIFIED LONG TERM INSULIN USE STATUS: Primary | ICD-10-CM

## 2017-08-18 DIAGNOSIS — E11.9 TYPE 2 DIABETES MELLITUS WITHOUT COMPLICATION, UNSPECIFIED LONG TERM INSULIN USE STATUS: ICD-10-CM

## 2017-08-18 LAB
ALBUMIN SERPL-MCNC: 4 G/DL (ref 3.4–5)
ALBUMIN/GLOB SERPL: 1.3 {RATIO} (ref 0.8–1.7)
ALP SERPL-CCNC: 78 U/L (ref 45–117)
ALT SERPL-CCNC: 32 U/L (ref 13–56)
ANION GAP SERPL CALC-SCNC: 7 MMOL/L (ref 3–18)
AST SERPL-CCNC: 28 U/L (ref 15–37)
BILIRUB SERPL-MCNC: 0.7 MG/DL (ref 0.2–1)
BUN SERPL-MCNC: 30 MG/DL (ref 7–18)
BUN/CREAT SERPL: 39 (ref 12–20)
CALCIUM SERPL-MCNC: 9.4 MG/DL (ref 8.5–10.1)
CHLORIDE SERPL-SCNC: 106 MMOL/L (ref 100–108)
CHOLEST SERPL-MCNC: 148 MG/DL
CO2 SERPL-SCNC: 31 MMOL/L (ref 21–32)
CREAT SERPL-MCNC: 0.76 MG/DL (ref 0.6–1.3)
EST. AVERAGE GLUCOSE BLD GHB EST-MCNC: 114 MG/DL
GLOBULIN SER CALC-MCNC: 3.2 G/DL (ref 2–4)
GLUCOSE SERPL-MCNC: 88 MG/DL (ref 74–99)
HBA1C MFR BLD: 5.6 % (ref 4.2–5.6)
HDLC SERPL-MCNC: 90 MG/DL (ref 40–60)
HDLC SERPL: 1.6 {RATIO} (ref 0–5)
LDLC SERPL CALC-MCNC: 46.2 MG/DL (ref 0–100)
LIPID PROFILE,FLP: ABNORMAL
POTASSIUM SERPL-SCNC: 4.6 MMOL/L (ref 3.5–5.5)
PROT SERPL-MCNC: 7.2 G/DL (ref 6.4–8.2)
SODIUM SERPL-SCNC: 144 MMOL/L (ref 136–145)
TRIGL SERPL-MCNC: 59 MG/DL (ref ?–150)
VLDLC SERPL CALC-MCNC: 11.8 MG/DL

## 2017-08-18 PROCEDURE — 36415 COLL VENOUS BLD VENIPUNCTURE: CPT | Performed by: INTERNAL MEDICINE

## 2017-08-18 PROCEDURE — 80053 COMPREHEN METABOLIC PANEL: CPT | Performed by: INTERNAL MEDICINE

## 2017-08-18 PROCEDURE — 83036 HEMOGLOBIN GLYCOSYLATED A1C: CPT | Performed by: INTERNAL MEDICINE

## 2017-08-18 PROCEDURE — 80061 LIPID PANEL: CPT | Performed by: INTERNAL MEDICINE

## 2017-08-21 DIAGNOSIS — E78.5 HYPERLIPIDEMIA LDL GOAL <100: ICD-10-CM

## 2017-08-31 ENCOUNTER — HOSPITAL ENCOUNTER (OUTPATIENT)
Dept: LAB | Age: 82
Discharge: HOME | End: 2017-08-31
Payer: MEDICARE

## 2017-08-31 ENCOUNTER — PATIENT OUTREACH (OUTPATIENT)
Dept: INTERNAL MEDICINE CLINIC | Age: 82
End: 2017-08-31

## 2017-08-31 ENCOUNTER — OFFICE VISIT (OUTPATIENT)
Dept: INTERNAL MEDICINE CLINIC | Age: 82
End: 2017-08-31

## 2017-08-31 VITALS
SYSTOLIC BLOOD PRESSURE: 130 MMHG | HEART RATE: 67 BPM | RESPIRATION RATE: 14 BRPM | BODY MASS INDEX: 22.91 KG/M2 | OXYGEN SATURATION: 98 % | HEIGHT: 62 IN | DIASTOLIC BLOOD PRESSURE: 66 MMHG | WEIGHT: 124.5 LBS | TEMPERATURE: 98.1 F

## 2017-08-31 DIAGNOSIS — Z23 ENCOUNTER FOR IMMUNIZATION: ICD-10-CM

## 2017-08-31 DIAGNOSIS — E78.5 HYPERLIPIDEMIA LDL GOAL <100: ICD-10-CM

## 2017-08-31 DIAGNOSIS — Z71.89 ADVANCE DIRECTIVE DISCUSSED WITH PATIENT: ICD-10-CM

## 2017-08-31 DIAGNOSIS — E11.9 TYPE 2 DIABETES MELLITUS WITHOUT COMPLICATION, UNSPECIFIED LONG TERM INSULIN USE STATUS: ICD-10-CM

## 2017-08-31 DIAGNOSIS — M81.0 POSTMENOPAUSAL OSTEOPOROSIS: ICD-10-CM

## 2017-08-31 DIAGNOSIS — I10 ESSENTIAL HYPERTENSION WITH GOAL BLOOD PRESSURE LESS THAN 140/90: Primary | ICD-10-CM

## 2017-08-31 DIAGNOSIS — Z00.00 MEDICARE ANNUAL WELLNESS VISIT, SUBSEQUENT: ICD-10-CM

## 2017-08-31 DIAGNOSIS — I48.0 PAROXYSMAL ATRIAL FIBRILLATION (HCC): ICD-10-CM

## 2017-08-31 PROCEDURE — 82043 UR ALBUMIN QUANTITATIVE: CPT | Performed by: INTERNAL MEDICINE

## 2017-08-31 RX ORDER — LOVASTATIN 20 MG/1
20 TABLET ORAL DAILY
Qty: 90 TAB | Refills: 3 | Status: SHIPPED | OUTPATIENT
Start: 2017-08-31

## 2017-08-31 RX ORDER — DEXAMETHASONE 4 MG/1
TABLET ORAL
Qty: 20 TAB | Refills: 0 | Status: SHIPPED | OUTPATIENT
Start: 2017-08-31 | End: 2017-12-04

## 2017-08-31 NOTE — PATIENT INSTRUCTIONS
Medicare Part B Preventive Services Limitations Recommendation Scheduled   Bone Mass Measurement  (age 72 & older, biennial) Requires diagnosis related to osteoporosis or estrogen deficiency. Biennial benefit unless patient has history of long-term glucocorticoid tx or baseline is needed because initial test was by other method Every 2 years or more frequently if medically necessary. Order pended     Cardiovascular Screening Blood Tests (every 5 years)  Total cholesterol, HDL, Triglycerides Order as a panel if possible Every 5 years. Done:  8/18/2017         Colorectal Cancer Screening  -Fecal occult blood test (annual)  -Flexible sigmoidoscopy (5y)  -Screening colonoscopy (10y)  -Barium Enema Colorectal Cancer Screening (Ages 54-65 yrs old)  For all patients 48 and older:  -Annual fecal occult blood test or  colonoscopy every 10 years or  -Flexible sigmoidoscopy every 5 years or  -lower endoscopy to be performed more frequently, if advised by GI. N/A         Counseling to Prevent Tobacco Use (up to 8 sessions per year)  - Counseling greater than 3 and up to 10 minutes  - Counseling greater than 10 minutes Patients must be asymptomatic of tobacco-related conditions to receive as preventive service Two cessation counseling attempts (up to 8 counseling sessions) per year. N/A   Diabetes Screening Tests (at least every 3 years, Medicare covers annually or at 6-month intervals for prediabetic patients)    Fasting blood sugar (FBS) or glucose tolerance test (GTT) Patient must be diagnosed with one of the following:  -Hypertension, Dyslipidemia, obesity, previous impaired FBS or GTT  Or any two of the following: overweight, FH of diabetes, age ? 72, history of gestational diabetes, birth of baby weighing more than 9 pounds Annually or every 6 months if previous diagnosis of elevated FBS, elevated HbA1c, or impaired GTT, or glucosuria.  Done:  8/18/2017         Diabetes Self-Management Training (DSMT) (no USPSTF recommendation) Requires referral by treating physician for patient with diabetes or renal disease. 10 hours of initial DSMT session of no less than 30 minutes each in a continuous 12-month period. 2 hours of follow-up DSMT in subsequent years. Up to 10 hours of initial training within a continuous 12 month period of subsequent years: up to 2 hours of follow-up training each year after the initial year. A1C 5.6 as of 8/18/2017   Glaucoma Screening (no USPSTF recommendation) Diabetes mellitus, family history, , age 48 or over,  American, age 72 or over Annually for covered beneficiaries. Sees Dr. Lucy Miller. Will request last eye exam.          Human Immunodeficiency Virus (HIV) Screening (annually for increased risk patients)  HIV-1 and HIV-2 by EIA, ELMA, rapid antibody test, or oral mucosa transudate Patient must be at increased risk for HIV infection per USPSTF guidelines or pregnant. Tests covered annually for patients at increased risk. Pregnant patients may receive up to 3 test during pregnancy. Annually for beneficiaries at increased risk, including anyone who asks for the test. Denies risk   Medical Nutrition Therapy (MNT) (for diabetes or renal disease not recommended schedule) Requires referral by treating physician for patient with diabetes or renal disease. Can be provided in same year as diabetes self-management training (DSMT), and CMS recommends medical nutrition therapy take place after DSMT. Up to 3 hours for initial year and 2 hours in subsequent years. First year: 3 hours of one-on-one counseling or subsequent years: 2 hours. Patient declined at this time.    Prostate Cancer Screening (annually up to age 76)  - Digital rectal exam (LE)  - Prostate specific antigen (PSA) Annually (age 48 or over), LE not paid separately when covered E/M service is provided on same date Once every 12 months for patients age older than 48years of age includes: digital rectal exam and/or prostate specific antigen test. N/A         Seasonal Influenza Vaccination (annually)  Once per fall or winter season. Done:  8/31/2017           Pneumococcal Vaccination (once after 72)  Once after age 72 and if more than 5 years since last vaccination and/or uncertainty of vaccine status. Pneumococcal:  4/28/2017    Prevnar 13:  4/21/2016   Hepatitis B Vaccinations (if medium/high risk) Medium/high risk factors:  End-stage renal disease,  Hemophiliacs who received Factor VIII or IX concentrates, Clients of institutions for the mentally retarded, Persons who live in the same house as a HepB virus carrier, Homosexual men, Illicit injectable drug abusers. Schedule course of vaccines if patient not previously vaccinated  *additional shots if medically necessary. Denies risk   Screening Mammography (biennial age 54-69)? Annually (age 36 or over) Age 28 through 44: one baseline or aged 36 and older: annually. N/A         Screening Pap Tests and Pelvic Examination (up to age 79 and after 79 if unknown history or abnormal study last 10 years) Every 24 months except high risk Annually if at manuel risk for developing cervical or vaginal cancer, or childbearing, age with abnormal Pap test within past 3 years or every 2 years for women at normal risk. N/A           Ultrasound Screening for Abdominal Aortic Aneurysm (AAA) (once) Patient must be referred through IPPE and not have had a screening for abdominal aortic aneurysm before under Medicare. Limited to patients who meet one of the following criteria:  - Men who are 73-68 years old and have smoked more than 100 cigarettes in their lifetime.  -Anyone with a FH of AAA  -Anyone recommended for screening by USPSTF Once in a lifetime. N/A       Schedule of Personalized Health Plan  (Provide Copy to Patient)  The best way to stay healthy is to live a healthy lifestyle.  A healthy lifestyle includes regular exercise, eating a well-balanced diet, keeping a healthy weight and not smoking. Regular physical exams and screening tests are another important way to take care of yourself. Preventive exams provided by health care providers can find health problems early when treatment works best and can keep you from getting certain diseases or illnesses. Preventive services include exams, lab tests, screenings, shots, monitoring and information to help you take care of your own health. All people over 65 should have a pneumonia shot. Pneumonia shots are usually only needed once in a lifetime unless your doctor decides differently. All people over 65 should have a yearly flu shot. People over 65 are at medium to high risk for Hepatitis B. Three shots are needed for complete protection. In addition to your physical exam, some screening tests are recommended:    Bone mass measurement (dexa scan) is recommended every two years  Diabetes Mellitus screening is recommended every year. Glaucoma is an eye disease caused by high pressure in the eye. An eye exam is recommended every year. Cardiovascular screening tests that check your cholesterol and other blood fat (lipid) levels are recommended every five years. Colorectal Cancer screening tests help to find pre-cancerous polyps (growths in the colon) so they can be removed before they turn into cancer. Tests ordered for screening depend on your personal and family history risk factors.     Screening for Breast Cancer is recommended yearly with a mammogram.    Screening for Cervical Cancer is recommended every two years (annually for certain risk factors, such as previous history of STD or abnormal PAP in past 7 years), with a Pelvic Exam with PAP    Here is a list of your current Health Maintenance items with a due date:  Health Maintenance   Topic Date Due    EYE EXAM RETINAL OR DILATED Q1  10/31/2017 (Originally 12/29/1938)    FOOT EXAM Q1  01/30/2018 (Originally 12/29/1938)    MICROALBUMIN Q1  01/30/2018 (Originally 12/29/1938)    HEMOGLOBIN A1C Q6M  02/18/2018    LIPID PANEL Q1  08/18/2018    MEDICARE YEARLY EXAM  09/01/2018    GLAUCOMA SCREENING Q2Y  02/06/2019    DTaP/Tdap/Td series (2 - Td) 08/31/2027    OSTEOPOROSIS SCREENING (DEXA)  Completed    ZOSTER VACCINE AGE 60>  Addressed    Pneumococcal 65+ Low/Medium Risk  Completed    INFLUENZA AGE 9 TO ADULT  Completed              Advance Directives: Care Instructions  Your Care Instructions  An advance directive is a legal way to state your wishes at the end of your life. It tells your family and your doctor what to do if you can no longer say what you want. There are two main types of advance directives. You can change them any time that your wishes change. · A living will tells your family and your doctor your wishes about life support and other treatment. · A durable power of  for health care lets you name a person to make treatment decisions for you when you can't speak for yourself. This person is called a health care agent. If you do not have an advance directive, decisions about your medical care may be made by a doctor or a  who doesn't know you. It may help to think of an advance directive as a gift to the people who care for you. If you have one, they won't have to make tough decisions by themselves. Follow-up care is a key part of your treatment and safety. Be sure to make and go to all appointments, and call your doctor if you are having problems. It's also a good idea to know your test results and keep a list of the medicines you take. How can you care for yourself at home? · Discuss your wishes with your loved ones and your doctor. This way, there are no surprises. · Many states have a unique form. Or you might use a universal form that has been approved by many states. This kind of form can sometimes be completed and stored online. Your electronic copy will then be available wherever you have a connection to the Internet.  In most cases, doctors will respect your wishes even if you have a form from a different state. · You don't need a  to do an advance directive. But you may want to get legal advice. · Think about these questions when you prepare an advance directive:  ¨ Who do you want to make decisions about your medical care if you are not able to? Many people choose a family member or close friend. ¨ Do you know enough about life support methods that might be used? If not, talk to your doctor so you understand. ¨ What are you most afraid of that might happen? You might be afraid of having pain, losing your independence, or being kept alive by machines. ¨ Where would you prefer to die? Choices include your home, a hospital, or a nursing home. ¨ Would you like to have information about hospice care to support you and your family? ¨ Do you want to donate organs when you die? ¨ Do you want certain Moravian practices performed before you die? If so, put your wishes in the advance directive. · Read your advance directive every year, and make changes as needed. When should you call for help? Be sure to contact your doctor if you have any questions. Where can you learn more? Go to http://raine-geovanny.info/. Enter R264 in the search box to learn more about \"Advance Directives: Care Instructions. \"  Current as of: November 17, 2016  Content Version: 11.3  © 2907-8079 [employer], [employer]. Care instructions adapted under license by [employer] (which disclaims liability or warranty for this information). If you have questions about a medical condition or this instruction, always ask your healthcare professional. [place] [address] any warranty or liability for your use of this information. Preventing Falls: Care Instructions  Your Care Instructions  Getting around your home safely can be a challenge if you have injuries or health problems that make it easy for you to fall. Loose rugs and furniture in walkways are among the dangers for many older people who have problems walking or who have poor eyesight. People who have conditions such as arthritis, osteoporosis, or dementia also have to be careful not to fall. You can make your home safer with a few simple measures. Follow-up care is a key part of your treatment and safety. Be sure to make and go to all appointments, and call your doctor if you are having problems. It's also a good idea to know your test results and keep a list of the medicines you take. How can you care for yourself at home? Taking care of yourself  · You may get dizzy if you do not drink enough water. To prevent dehydration, drink plenty of fluids, enough so that your urine is light yellow or clear like water. Choose water and other caffeine-free clear liquids. If you have kidney, heart, or liver disease and have to limit fluids, talk with your doctor before you increase the amount of fluids you drink. · Exercise regularly to improve your strength, muscle tone, and balance. Walk if you can. Swimming may be a good choice if you cannot walk easily. · Have your vision and hearing checked each year or any time you notice a change. If you have trouble seeing and hearing, you might not be able to avoid objects and could lose your balance. · Know the side effects of the medicines you take. Ask your doctor or pharmacist whether the medicines you take can affect your balance. Sleeping pills or sedatives can affect your balance. · Limit the amount of alcohol you drink. Alcohol can impair your balance and other senses. · Ask your doctor whether calluses or corns on your feet need to be removed. If you wear loose-fitting shoes because of calluses or corns, you can lose your balance and fall. · Talk to your doctor if you have numbness in your feet. Preventing falls at home  · Remove raised doorway thresholds, throw rugs, and clutter.  Repair loose carpet or raised areas in the floor. · Move furniture and electrical cords to keep them out of walking paths. · Use nonskid floor wax, and wipe up spills right away, especially on ceramic tile floors. · If you use a walker or cane, put rubber tips on it. If you use crutches, clean the bottoms of them regularly with an abrasive pad, such as steel wool. · Keep your house well lit, especially [facility], and outside walkways. Use night-lights in areas such as hallways and bathrooms. Add extra light switches or use remote switches (such as switches that go on or off when you clap your hands) to make it easier to turn lights on if you have to get up during the night. · Install sturdy handrails on stairways. · Move items in your cabinets so that the things you use a lot are on the lower shelves (about waist level). · Keep a cordless phone and a flashlight with new batteries by your bed. If possible, put a phone in each of the main rooms of your house, or carry a cell phone in case you fall and cannot reach a phone. Or, you can wear a device around your neck or wrist. You push a button that sends a signal for help. · Wear low-heeled shoes that fit well and give your feet good support. Use footwear with nonskid soles. Check the heels and soles of your shoes for wear. Repair or replace worn heels or soles. · Do not wear socks without shoes on wood floors. · Walk on the grass when the sidewalks are slippery. If you live in an area that gets snow and ice in the winter, sprinkle salt on slippery steps and sidewalks. Preventing falls in the bath  · Install grab bars and nonskid mats inside and outside your shower or tub and near the toilet and sinks. · Use shower chairs and bath benches. · Use a hand-held shower head that will allow you to sit while showering.   · Get into a tub or shower by putting the weaker leg in first. Get out of a tub or shower with your strong side first.  · Repair loose toilet seats and consider installing a raised toilet seat to make getting on and off the toilet easier. · Keep your bathroom door unlocked while you are in the shower. Where can you learn more? Go to http://raine-geovanny.info/. Enter [phone] [id number] [phone] in the search box to learn more about \"Preventing Falls: Care Instructions. \"  Current as of: August 4, 2016  Content Version: 11.3  © 8208-5875 [employer]. Care instructions adapted under license by [employer] (which disclaims liability or warranty for this information). If you have questions about a medical condition or this instruction, always ask your healthcare professional. [place] [address] any warranty or liability for your use of this information.

## 2017-08-31 NOTE — MR AVS SNAPSHOT
Visit Information Date & Time Provider Department Dept. Phone Encounter #  
 8/31/2017 10:00 AM Sintia Alvarado MD [facility] of [address] [place] [address] Place [id number] Your Appointments 9/29/2017  8:30 AM  
PROCEDURE with Jaci Gerardo MD  
[facility] ([facility]) Appt Note: Cysto poss retro [address] [place] [id number]  
[address] [facility] [address] [place] [address]  
  
    
 9/29/2017  8:30 AM  
XRAY Visit with [facility] ([facility]) Appt Note: Cysto poss retro [address] [place] [address]  
[phone]  
  
   
 [address] [place] [address]  
  
    
 1/10/2018 11:00 AM  
Follow Up with Lesley Cooley MD  
[facility] [place] ([facility]) Appt Note: F/U 6 months [place] [facility] [address] [phone] [address] Upcoming Health Maintenance Date Due  
 MEDICARE YEARLY EXAM 4/22/2017 INFLUENZA AGE 9 TO ADULT 9/1/2017* EYE EXAM RETINAL OR DILATED Q1 10/31/2017* FOOT EXAM Q1 1/30/2018* MICROALBUMIN Q1 1/30/2018* HEMOGLOBIN A1C Q6M 2/18/2018 LIPID PANEL Q1 8/18/2018 GLAUCOMA SCREENING Q2Y 2/6/2019 DTaP/Tdap/Td series (2 - Td) 8/31/2027 *Topic was postponed. The date shown is not the original due date. Allergies as of 8/31/2017  Review Complete On: 8/31/2017 By: Sintia Alvarado MD  
  
 Severity Noted Reaction Type Reactions Bactrim [Sulfamethoprim Ds] High 02/11/2013   Systemic Nausea and Vomiting Vicodin [Hydrocodone-acetaminophen] Medium 10/03/2012   Side Effect Nausea Only Atenolol    Other (comments)  
 interolance Atenolol  05/09/2017    Other (comments) Black out Codeine  05/09/2017    Other (comments)  
 headache Erythromycin    Unknown (comments) Erythromycin  05/09/2017    Hives, Itching Other Medication    Not Reported This Time Refined sugar Pcn [Penicillins]    Hives Penicillin G  05/09/2017    Hives, Itching Current Immunizations  Reviewed on 8/31/2017 Name Date Influenza High Dose Vaccine PF 8/31/2017 10:27 AM, 8/26/2016 10:11 AM, 10/12/2015 11:23 AM, 9/10/2014  3:33 PM  
 Influenza Vaccine 10/24/2013 Influenza Vaccine Split 10/28/2011 Influenza Vaccine Whole 10/25/2010 Pneumococcal Conjugate (PCV-13) 4/21/2016 10:28 AM  
 Pneumococcal Polysaccharide (PPSV-23) 4/28/2017  9:07 AM  
 TB Skin Test (PPD) 1/1/1992 Reviewed by Brennen Thacker MD on 8/31/2017 at 10:10 AM  
You Were Diagnosed With   
  
 Codes Comments Essential hypertension with goal blood pressure less than 140/90    -  Primary ICD-10-CM: I10 
ICD-9-CM: 401.9 Encounter for immunization     ICD-10-CM: P44 ICD-9-CM: V03.89 Paroxysmal atrial fibrillation (HCC)     ICD-10-CM: I48.0 ICD-9-CM: 427.31 Hyperlipidemia LDL goal <100     ICD-10-CM: E78.5 ICD-9-CM: 272.4 Medicare annual wellness visit, subsequent     ICD-10-CM: Z00.00 ICD-9-CM: V70.0 Advance directive discussed with patient     ICD-10-CM: Z71.89 ICD-9-CM: V65.49 Vitals BP Pulse Temp Resp Height(growth percentile) Weight(growth percentile) 130/66 67 98.1 °F (36.7 °C) (Oral) 14 5' 2\" (1.575 m) 124 lb 8 oz (56.5 kg) SpO2 BMI OB Status Smoking Status 98% 22.77 kg/m2 Hysterectomy Never Smoker Vitals History BMI and BSA Data Body Mass Index Body Surface Area  
 22.77 kg/m 2 1.57 m 2 Preferred Pharmacy Pharmacy Name Phone [facility] [address] [facility], [address] [facility] [phone] Your Updated Medication List  
  
   
This list is accurate as of: 8/31/17 10:48 AM.  Always use your most recent med list.  
  
  
  
  
 ACIDOPHILUS Cap Generic drug:  Lactobacillus acidophilus Take 2 Caps by mouth two (2) times a day. ALEVE 220 mg Cap Generic drug:  naproxen sodium Take  by mouth. amLODIPine 10 mg tablet Commonly known as:  NORVASC  
take 1 tablet by mouth daily  
  
 ascorbic acid (vitamin C) 250 mg tablet Commonly known as:  VITAMIN C Take 1 Tab by mouth two (2) times daily (with meals). [Request refills from PCP (Dr. Wilbert Eisenmenger MacKinnon)] aspirin 81 mg tablet Take 81 mg by mouth daily. carvedilol 12.5 mg tablet Commonly known as:  COREG  
take 1 tablet by mouth twice a day  
  
 cholecalciferol 1,000 unit tablet Commonly known as:  VITAMIN D3 Take 2 Tabs by mouth daily. [Request refills from PCP (Dr. Wilbert Eisenmenger MacKinnon)] dexamethasone 4 mg tablet Commonly known as:  DECADRON  
1 tablet by mouth twice a day  
  
 docusate sodium 100 mg capsule Commonly known as:  Mahala Dunbar One capsule by mouth twice daily FISH OIL PO Take 3 Tabs by mouth daily. hydroCHLOROthiazide 12.5 mg capsule Commonly known as:  MICROZIDE  
take 1 capsule by mouth once daily  
  
 losartan 50 mg tablet Commonly known as:  COZAAR Take 1 Tab by mouth daily. lovastatin 20 mg tablet Commonly known as:  MEVACOR Take 1 Tab by mouth daily. multivitamin tablet Commonly known as:  ONE A DAY Take 1 Tab by mouth daily. PriLOSEC 20 mg capsule Generic drug:  omeprazole Take 20 mg by mouth daily as needed. tolterodine 2 mg tablet Commonly known as:  Danice Kubas Take 1 Tab by mouth two (2) times a day. Prescriptions Sent to Pharmacy Refills  
 lovastatin (MEVACOR) 20 mg tablet 3 Sig: Take 1 Tab by mouth daily. Class: Normal  
 Pharmacy: [id number] [address] [facility], [address] [place] [address] Ph #: [phone] Route: Oral  
 dexamethasone (DECADRON) 4 mg tablet 0 Si tablet by mouth twice a day  Class: Normal  
 Pharmacy: [id number] [address], [address] [facility] #: [phone] We Performed the Following INFLUENZA VIRUS VACCINE, HIGH DOSE SEASONAL, PRESERVATIVE FREE [27750 CPT(R)] To-Do List   
 Around 12/25/2017 Lab:  LIPID PANEL Around 12/25/2017 Lab:  METABOLIC PANEL, COMPREHENSIVE Introducing [facility]! Shannan Booker introduces Array Storm patient portal. Now you can access parts of your medical record, email your doctor's office, and request medication refills online. 1. In your internet browser, go to [email or web address] 2. Click on the First Time User? Click Here link in the Sign In box. You will see the New Member Sign Up page. 3. Enter your Array Storm Access Code exactly as it appears below. You will not need to use this code after youve completed the sign-up process. If you do not sign up before the expiration date, you must request a new code. · Array Storm Access Code: [email or web address] Expires: 11/29/2017 10:48 AM 
 
4. Enter the last four digits of your Social Security Number (xxxx) and Date of Birth (mm/dd/yyyy) as indicated and click Submit. You will be taken to the next sign-up page. 5. Create a Array Storm ID. This will be your Array Storm login ID and cannot be changed, so think of one that is secure and easy to remember. 6. Create a Array Storm password. You can change your password at any time. 7. Enter your Password Reset Question and Answer. This can be used at a later time if you forget your password. 8. Enter your e-mail address. You will receive e-mail notification when new information is available in [address] 9. Click Sign Up. You can now view and download portions of your medical record. 10. Click the Download Summary menu link to download a portable copy of your medical information.  
 
If you have questions, please visit the Frequently Asked Questions section of the PeptiVir. Remember, [email or web address] is NOT to be used for urgent needs. For medical emergencies, dial 911. Now available from your iPhone and Android! Please provide this summary of care documentation to your next provider. Your primary care clinician is listed as Pam Lovell. If you have any questions after today's visit, please call [phone].

## 2017-08-31 NOTE — PROGRESS NOTES
Melani Potter is a 80 y.o. female and presents for annual Medicare Wellness Visit. Problem List: Reviewed with patient and discussed risk factors. Patient Active Problem List   Diagnosis Code    Gross hematuria R31.0    Type 2 diabetes mellitus without complication ([facility]) F13.6    Essential hypertension I10    Atrophic vaginitis N95.2    Coronary artery disease, status post PTCA of LAD in March 1995/EF 70%. I25.10    Asymptomatic carotid bruit R09.89    Hypertension I10    Chest pain R07.9    Mild aortic stenosis I35.0    Osteoporosis M81.0    Aspiration pneumonia ([facility]) J69.0    Closed bilateral fracture of pubic rami ([facility]) S32.591A, S32.592A    Impaired mobility and ADLs Z74.09    Dysphagia R13.10    Osteoarthritis M19.90    Allergic rhinitis J30.9    History of peptic ulcer Z87.11    Gastroesophageal reflux disease K21.9    Dyslipidemia E78.5    Chronic anemia D64.9    History of acute respiratory failure Z87.09    Requires supplemental oxygen Z99.81    Urinary tract infection N39.0    CKD (chronic kidney disease) stage 2, GFR 60-89 ml/min N18.2    Pulmonary collapse J98.19    Essential hypertension I10    Palpitations, prob.  secondary to PVC's  R00.2    Abnormal nuclear stress test R94.39    Advance directive discussed with patient Z71.89    Hyperlipidemia LDL goal <100 E78.5    Essential hypertension with goal blood pressure less than 140/90 I10    Mild single current episode of major depressive disorder (HCC) F32.0    PAF (paroxysmal atrial fibrillation) ([facility]) I48.0    Chronic atrial fibrillation (HCC) I48.2    History of gross hematuria Z87.448    Urethral caruncle N36.2       Current medical providers:  Patient Care Team:  Evelyn Salgado MD as PCP - General (Internal Medicine)  Saroj Henderson MD (Cardiology)  Wade Wilson RN as [address] (Internal Medicine)  Justin Traylor MD (Pulmonary Disease)  Hanane Torres MD (Ophthalmology)  Janel Boogie BRYANT Zeng as [address] (Internal Medicine)  Rosie Saavedra MD (Internal Medicine)  Neil Neal MD (Urology)    PSH: Reviewed with patient  Past Surgical History:   Procedure Laterality Date    HX CATARACT REMOVAL      HX COLONOSCOPY      HX HEART CATHETERIZATION  12/15/10    negative    HX HYSTERECTOMY      HX KNEE ARTHROSCOPY      HX PTCA  3/1995    of the LAD; has had no recurrent angina since    HX TONSILLECTOMY      KY BRONCHOSCOPY BRONCHIAL/ENDOBRNCL BX 1+ SITES  9/25/2012             SH: Reviewed with patient  Social History   Substance Use Topics    Smoking status: Never Smoker    Smokeless tobacco: Never Used      Comment: extensive secondhand smoke exposure through job    Alcohol use No       FH: Reviewed with patient  Family History   Problem Relation Age of Onset    Heart Disease Mother      History of CHF    Heart Disease Father     Heart Attack Father     Heart Disease Sister     Cancer Brother      non-Hodgkin lymphoma    Cancer Brother      liver cancer    Heart Disease Brother     Stroke Brother     Parkinsonism Sister     Parkinsonism Brother        Medications/Allergies: Reviewed with patient  Current Outpatient Prescriptions on File Prior to Visit   Medication Sig Dispense Refill    hydroCHLOROthiazide (MICROZIDE) 12.5 mg capsule take 1 capsule by mouth once daily 90 Cap 3    multivitamin (ONE A DAY) tablet Take 1 Tab by mouth daily.  Lactobacillus acidophilus (ACIDOPHILUS) cap Take 2 Caps by mouth two (2) times a day.  losartan (COZAAR) 50 mg tablet Take 1 Tab by mouth daily. 30 Tab 6    amLODIPine (NORVASC) 10 mg tablet take 1 tablet by mouth daily 30 Tab 6    tolterodine (DETROL) 2 mg tablet Take 1 Tab by mouth two (2) times a day.  180 Tab 3    carvedilol (COREG) 12.5 mg tablet take 1 tablet by mouth twice a day 180 Tab 3    docusate sodium (COLACE) 100 mg capsule One capsule by mouth twice daily 60 Cap 3    omeprazole (PRILOSEC) 20 mg capsule Take 20 mg by mouth daily as needed.  naproxen sodium (ALEVE) 220 mg cap Take  by mouth.  ascorbic acid (VITAMIN C) 250 mg tablet Take 1 Tab by mouth two (2) times daily (with meals). [Request refills from PCP (Dr. Shankar Chapman)] 30 Tab 0    cholecalciferol (VITAMIN D3) 1,000 unit tablet Take 2 Tabs by mouth daily. [Request refills from PCP (Dr. Shankar Chapman)] 30 Tab 0    aspirin 81 mg tablet Take 81 mg by mouth daily.  DOCOSAHEXANOIC ACID/EPA (FISH OIL PO) Take 3 Tabs by mouth daily. No current facility-administered medications on file prior to visit. Allergies   Allergen Reactions    Bactrim [Sulfamethoprim Ds] Nausea and Vomiting    Vicodin [Hydrocodone-Acetaminophen] Nausea Only    Atenolol Other (comments)     interolance    Atenolol Other (comments)     Black out    Codeine Other (comments)     headache    Erythromycin Unknown (comments)    Erythromycin Hives and Itching    Other Medication Not Reported This Time     Refined sugar    Pcn [Penicillins] Hives    Penicillin G Hives and Itching       Objective:  Visit Vitals    /66    Pulse 67    Temp 98.1 °F (36.7 °C) (Oral)    Resp 14    Ht 5' 2\" (1.575 m)    Wt 124 lb 8 oz (56.5 kg)    SpO2 98%    BMI 22.77 kg/m2    Body mass index is 22.77 kg/(m^2). Assessment of cognitive impairment: Alert and oriented x 3    Depression Screen:   PHQ over the last two weeks 10/12/2015   Little interest or pleasure in doing things Several days   Feeling down, depressed or hopeless Several days   Total Score PHQ 2 2       Fall Risk Assessment:    Fall Risk Assessment, last 12 mths 8/31/2017   Able to walk? Yes   Fall in past 12 months? No   Fall with injury? -   Number of falls in past 12 months -   Fall Risk Score -       Functional Ability:   Does the patient exhibit a steady gait? No; ambulating with 3 point straight cane. Verbalizes she was advised to start using RW.    How long did it take the patient to get up and walk from a sitting position? 2 seconds   Is the patient self reliant?  (ie can do own laundry, meals, household chores)  yes     Does the patient handle his/her own medications? yes     Does the patient handle his/her own money? yes     Is the patients home safe (ie good lighting, handrails on stairs and bath, etc.)? Yes; has walk-in shower, raised toilet seat     Did you notice or did patient express any hearing difficulties? no     Did you notice or did patient express any vision difficulties?   no     Were distance and reading eye charts used? no       Advance Care Planning:   Patient was offered the opportunity to discuss advance care planning:  yes     Does patient have an Advance Directive:  yes   If no, did you provide information on Caring Connections?  no       Plan:      Orders Placed This Encounter    Influenza virus vaccine (FLUZONE HIGH-DOSE) 65 years and older (61326)    METABOLIC PANEL, COMPREHENSIVE    LIPID PANEL    lovastatin (MEVACOR) 20 mg tablet    dexamethasone (DECADRON) 4 mg tablet       Health Maintenance   Topic Date Due    EYE EXAM RETINAL OR DILATED Q1  10/31/2017 (Originally 12/29/1938)    FOOT EXAM Q1  01/30/2018 (Originally 12/29/1938)    MICROALBUMIN Q1  01/30/2018 (Originally 12/29/1938)    HEMOGLOBIN A1C Q6M  02/18/2018    LIPID PANEL Q1  08/18/2018    MEDICARE YEARLY EXAM  09/01/2018    GLAUCOMA SCREENING Q2Y  02/06/2019    DTaP/Tdap/Td series (2 - Td) 08/31/2027    OSTEOPOROSIS SCREENING (DEXA)  Completed    ZOSTER VACCINE AGE 60>  Addressed    Pneumococcal 65+ Low/Medium Risk  Completed    INFLUENZA AGE 9 TO ADULT  Completed       *Patient verbalized understanding and agreement with the plan. A copy of the After Visit Summary with personalized health plan was given to the patient today.

## 2017-08-31 NOTE — ACP (ADVANCE CARE PLANNING)
Non-Provider Advance Care Planning (ACP) Note    Date of ACP Conversation: 8/31/2017  Persons included in Conversation: patient  Length of ACP Conversation in minutes: <16 minutes (Non-Billable)    Conversation requested by:   Provider      Authorized Decision Maker (if patient is incapable of making informed decisions): This person is: Other Legally Authorized Decision Maker (e.g. Next of Kin)    General ACP for ALL Patients with Decision Making Capacity:    Advance Directive Conversation with Patients who have not yet planned:  N/A    Review of Existing Advance Directive: (Select questions covered)  \"What discussions did you have with your healthcare agent about your goals, values, and preferences before you completed the document? \" Alyse Flores daughter is aware that she does not want her death to be prolonged if doctor determines her death is close. Interventions Provided:  Recommended providing copy of Advance Care Plan to office to be scanned into medical chart.

## 2017-08-31 NOTE — PROGRESS NOTES
David Hameed,born 12/29/1928, is a 80 y.o. female, who is seen today for reevaluation of hypertension hyperlipidemia paroxysmal atrial fibrillation. She complains of sciatic pain on the left and previously had received epidural injections without benefit. Sometimes it goes all the way down to her ankle and that is the main thing that bothers her currently. She has a history of paroxysmal atrial fibrillation and her cardiologist did have her on Xarelto but after a while she decided not to use that and take her chances. She takes her other medicine regularly and eats a very healthy diet. Past Medical History:   Diagnosis Date    Allergic rhinitis     Anemia     Asymptomatic carotid bruit     asymptomatic    Atrial fibrillation ([address] 04/2017    Started on Xarelto    Atrial fibrillation [facility] [address]     Cardiac cath 12/15/2010    pRCA 30%. LM patent. LAD 25%. CX 30%. LVEDP 10 mmHg. EF 60%. Mild AS.  Cardiac nuclear imaging test, low to mod risk 02/22/2016    Low to intermediate risk. Sm reversible inferoapical defect may be a sm area of ischemia. No prior infarction. EF >75%. Neg EKG on pharm stress test.    Carotid duplex 02/10/2014    Mild 1-49% bilateral ICA stenosis.      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 Nicolás Manley Hypertension     Lung collapse Dec 2011    Collapse of left upper lobe    Mild aortic stenosis 8/22/2011    Osteoarthritis     Osteoporosis     Positive PPD      Current Outpatient Prescriptions   Medication Sig Dispense Refill    lovastatin (MEVACOR) 20 mg tablet Take 1 Tab by mouth daily. 90 Tab 3    hydroCHLOROthiazide (MICROZIDE) 12.5 mg capsule take 1 capsule by mouth once daily 90 Cap 3    multivitamin (ONE A DAY) tablet Take 1 Tab by mouth daily.  Lactobacillus acidophilus (ACIDOPHILUS) cap Take 2 Caps by mouth two (2) times a day.  losartan (COZAAR) 50 mg tablet Take 1 Tab by mouth daily. 30 Tab 6    amLODIPine (NORVASC) 10 mg tablet take 1 tablet by mouth daily 30 Tab 6    tolterodine (DETROL) 2 mg tablet Take 1 Tab by mouth two (2) times a day. 180 Tab 3    carvedilol (COREG) 12.5 mg tablet take 1 tablet by mouth twice a day 180 Tab 3    docusate sodium (COLACE) 100 mg capsule One capsule by mouth twice daily 60 Cap 3    omeprazole (PRILOSEC) 20 mg capsule Take 20 mg by mouth daily as needed.  naproxen sodium (ALEVE) 220 mg cap Take  by mouth.  ascorbic acid (VITAMIN C) 250 mg tablet Take 1 Tab by mouth two (2) times daily (with meals). [Request refills from PCP (Dr. Erasto Chapman)] 30 Tab 0    cholecalciferol (VITAMIN D3) 1,000 unit tablet Take 2 Tabs by mouth daily. [Request refills from PCP (Dr. Erasto Chapman)] 30 Tab 0    aspirin 81 mg tablet Take 81 mg by mouth daily.  DOCOSAHEXANOIC ACID/EPA (FISH OIL PO) Take 3 Tabs by mouth daily. Visit Vitals    /66    Pulse 67    Temp 98.1 °F (36.7 °C) (Oral)    Resp 14    Ht 5' 2\" (1.575 m)    Wt 124 lb 8 oz (56.5 kg)    SpO2 98%    BMI 22.77 kg/m2     Carotids are 2+ without bruits. Lungs are clear to percussion. Somewhat diminished breath sounds throughout with no wheezing or crackles. Heart reveals an irregularly irregular rhythm with no murmur gallop click or rub. Apical impulse is not palpable.   Abdomen is soft and nontender with no hepatosplenomegaly or masses and no bruits. Extremities reveal no clubbing cyanosis or edema. Pulses are intact throughout. Equivocal left straight leg raising. Results for orders placed or performed during the hospital encounter of 08/18/17   HEMOGLOBIN A1C WITH EAG   Result Value Ref Range    Hemoglobin A1c 5.6 4.2 - 5.6 %    Est. average glucose 114 mg/dL   LIPID PANEL   Result Value Ref Range    LIPID PROFILE          Cholesterol, total 148 <200 MG/DL    Triglyceride 59 <150 MG/DL    HDL Cholesterol 90 (H) 40 - 60 MG/DL    LDL, calculated 46.2 0 - 100 MG/DL    VLDL, calculated 11.8 MG/DL    CHOL/HDL Ratio 1.6 0 - 5.0     METABOLIC PANEL, COMPREHENSIVE   Result Value Ref Range    Sodium 144 136 - 145 mmol/L    Potassium 4.6 3.5 - 5.5 mmol/L    Chloride 106 100 - 108 mmol/L    CO2 31 21 - 32 mmol/L    Anion gap 7 3.0 - 18 mmol/L    Glucose 88 74 - 99 mg/dL    BUN 30 (H) 7.0 - 18 MG/DL    Creatinine 0.76 0.6 - 1.3 MG/DL    BUN/Creatinine ratio 39 (H) 12 - 20      GFR est AA >60 >60 ml/min/1.73m2    GFR est non-AA >60 >60 ml/min/1.73m2    Calcium 9.4 8.5 - 10.1 MG/DL    Bilirubin, total 0.7 0.2 - 1.0 MG/DL    ALT (SGPT) 32 13 - 56 U/L    AST (SGOT) 28 15 - 37 U/L    Alk. phosphatase 78 45 - 117 U/L    Protein, total 7.2 6.4 - 8.2 g/dL    Albumin 4.0 3.4 - 5.0 g/dL    Globulin 3.2 2.0 - 4.0 g/dL    A-G Ratio 1.3 0.8 - 1.7       Assessment: #1. Hypertension controlled. She will continue amlodipine 10 mg daily and hydrochlorothiazide 12.5 mg daily. #2. Hyperlipidemia doing well. She will continue lovastatin 20 mg each evening. #3.  Paroxysmal atrial fibrillation, she will continue carvedilol 12.5 mg twice daily. #4.  GERD asymptomatic. She will continue omeprazole 20 mg daily.   #5.  Sciatic symptoms on the left, we will try her on dexamethasone 4 mg twice daily for 10 days and if she has significant side effects which we discussed she will cut down to once a day or stop it altogether. She had a Medicare wellness evaluation today and I agree with Adamaris's note. She will get a flu shot now. Follow-up in 4 months with lab    Franco Mendez MD FACP    Please note: This document has been produced using voice recognition software. Unrecognized errors in transcription may be present.

## 2017-09-01 LAB
CREAT UR-MCNC: 73.98 MG/DL (ref 30–125)
MICROALBUMIN UR-MCNC: 1.8 MG/DL (ref 0–3)
MICROALBUMIN/CREAT UR-RTO: 24 MG/G (ref 0–30)

## 2017-09-07 ENCOUNTER — HOSPITAL ENCOUNTER (OUTPATIENT)
Dept: BONE DENSITY | Age: 82
Discharge: HOME | End: 2017-09-07
Attending: INTERNAL MEDICINE
Payer: MEDICARE

## 2017-09-07 DIAGNOSIS — M81.0 POSTMENOPAUSAL OSTEOPOROSIS: ICD-10-CM

## 2017-09-07 PROCEDURE — 77080 DXA BONE DENSITY AXIAL: CPT

## 2017-09-19 ENCOUNTER — PATIENT OUTREACH (OUTPATIENT)
Dept: INTERNAL MEDICINE CLINIC | Age: 82
End: 2017-09-19

## 2017-09-19 NOTE — PROGRESS NOTES
Faxed request to Dr. Holland [facility] office to request most recent diabetic eye exam and glaucoma screening. Provided 2 patient identifiers and office fax number.

## 2017-10-10 RX ORDER — AMLODIPINE BESYLATE 10 MG/1
TABLET ORAL
Qty: 30 TAB | Refills: 6 | Status: SHIPPED | OUTPATIENT
Start: 2017-10-10 | End: 2018-05-09 | Stop reason: SDUPTHER

## 2017-10-24 ENCOUNTER — PATIENT OUTREACH (OUTPATIENT)
Dept: INTERNAL MEDICINE CLINIC | Age: 82
End: 2017-10-24

## 2017-10-31 RX ORDER — CARVEDILOL 12.5 MG/1
TABLET ORAL
Qty: 180 TAB | Refills: 3 | Status: SHIPPED | OUTPATIENT
Start: 2017-10-31

## 2017-11-02 RX ORDER — LOSARTAN POTASSIUM 50 MG/1
50 TABLET ORAL DAILY
Qty: 30 TAB | Refills: 6 | Status: SHIPPED | OUTPATIENT
Start: 2017-11-02 | End: 2018-05-31 | Stop reason: SDUPTHER

## 2017-11-02 NOTE — TELEPHONE ENCOUNTER
Last Visit: 08/31/2017 with MD Thony Horn    Next Appointment: 01/09/2018 with MD Thony Horn   Previous Refill Encounters: 03/30/2017 per MD Diego Marshall #30 with 6 refills     Requested Prescriptions     Pending Prescriptions Disp Refills    losartan (COZAAR) 50 mg tablet 30 Tab 6     Sig: Take 1 Tab by mouth daily.

## 2017-12-04 ENCOUNTER — HOSPITAL ENCOUNTER (EMERGENCY)
Age: 82
Discharge: HOME | End: 2017-12-04
Attending: EMERGENCY MEDICINE
Payer: MEDICARE

## 2017-12-04 VITALS
SYSTOLIC BLOOD PRESSURE: 116 MMHG | WEIGHT: 127 LBS | DIASTOLIC BLOOD PRESSURE: 80 MMHG | OXYGEN SATURATION: 97 % | HEART RATE: 81 BPM | RESPIRATION RATE: 16 BRPM | TEMPERATURE: 97.9 F | BODY MASS INDEX: 24.94 KG/M2 | HEIGHT: 60 IN

## 2017-12-04 DIAGNOSIS — M17.11 OSTEOARTHRITIS OF RIGHT KNEE, UNSPECIFIED OSTEOARTHRITIS TYPE: ICD-10-CM

## 2017-12-04 DIAGNOSIS — M25.561 RECURRENT PAIN OF RIGHT KNEE: Primary | ICD-10-CM

## 2017-12-04 PROCEDURE — 99282 EMERGENCY DEPT VISIT SF MDM: CPT

## 2017-12-04 RX ORDER — METHYLPREDNISOLONE 4 MG/1
TABLET ORAL
Qty: 1 DOSE PACK | Refills: 0 | Status: SHIPPED | OUTPATIENT
Start: 2017-12-04 | End: 2018-01-10 | Stop reason: ALTCHOICE

## 2017-12-04 RX ORDER — TRAMADOL HYDROCHLORIDE 50 MG/1
25 TABLET ORAL
Qty: 10 TAB | Refills: 0 | Status: SHIPPED | OUTPATIENT
Start: 2017-12-04 | End: 2018-05-31 | Stop reason: ALTCHOICE

## 2017-12-04 NOTE — ED PROVIDER NOTES
EMERGENCY DEPARTMENT HISTORY AND PHYSICAL EXAM    4:47 PM      Date: 12/4/2017  Patient Name: Ham Sevilla    History of Presenting Illness     Chief Complaint   Patient presents with    Knee Pain         History Provided By: Patient    Chief Complaint: Right knee pain   Duration: 10 Hours  Timing:  Worsening  Location: Right knee   Quality: N/A  Severity: 4 out of 10  Modifying Factors: Pain worsens with movement   Associated Symptoms: pain shots up and down right leg       Additional History (Context): Ham Sevilla is a 80 y.o. female with hx of DM, HT, CKD, DJD, knee bursitis, a-fib, anemia, CAD, osteoporosis, mild aortic stenosis and asymptomatic carotid bruit presenting to the ED with c/o worsening right knee pain that began 10 hours ago. Pt reports she woke up today at 7 AM in a lot of pain. States pain shots up and down her right leg. Pt denies any CP, SOB, numbness, leg swelling, nausea, vomiting, diarrhea,  injury to the knee or hx of blood clots. Pt is able to ambulate with a cane, however pain worsens with movement of the leg. Severity is 4/10. No other sx or complaints given at this time. PCP: Andrea Ash MD    Current Outpatient Prescriptions   Medication Sig Dispense Refill    traMADol (ULTRAM) 50 mg tablet Take 0.5 Tabs by mouth every six (6) hours as needed for Pain. Max Daily Amount: 100 mg. 10 Tab 0    methylPREDNISolone (MEDROL, LAVONNE,) 4 mg tablet As directed 1 Dose Pack 0    losartan (COZAAR) 50 mg tablet Take 1 Tab by mouth daily. 30 Tab 6    carvedilol (COREG) 12.5 mg tablet take 1 tablet by mouth twice a day 180 Tab 3    amLODIPine (NORVASC) 10 mg tablet take 1 tablet by mouth daily 30 Tab 6    lovastatin (MEVACOR) 20 mg tablet Take 1 Tab by mouth daily. 90 Tab 3    hydroCHLOROthiazide (MICROZIDE) 12.5 mg capsule take 1 capsule by mouth once daily 90 Cap 3    multivitamin (ONE A DAY) tablet Take 1 Tab by mouth daily.       Lactobacillus acidophilus (ACIDOPHILUS) cap Take 2 Caps by mouth two (2) times a day.  tolterodine (DETROL) 2 mg tablet Take 1 Tab by mouth two (2) times a day. 180 Tab 3    docusate sodium (COLACE) 100 mg capsule One capsule by mouth twice daily 60 Cap 3    omeprazole (PRILOSEC) 20 mg capsule Take 20 mg by mouth daily as needed.  ascorbic acid (VITAMIN C) 250 mg tablet Take 1 Tab by mouth two (2) times daily (with meals). [Request refills from PCP ( [facility])] 30 Tab 0    cholecalciferol (VITAMIN D3) 1,000 unit tablet Take 2 Tabs by mouth daily. [Request refills from PCP ( [facility])] 30 Tab 0    aspirin 81 mg tablet Take 81 mg by mouth daily.  DOCOSAHEXANOIC ACID/EPA (FISH OIL PO) Take 3 Tabs by mouth daily. Past History     Past Medical History:  Past Medical History:   Diagnosis Date    Allergic rhinitis     Anemia     Asymptomatic carotid bruit     asymptomatic    Atrial fibrillation ([facility] [address] 04/2017    Started on Xarelto    Atrial fibrillation ([facility] [address]     Cardiac cath 12/15/2010    pRCA 30%. LM patent. LAD 25%. CX 30%. LVEDP 10 mmHg. EF 60%. Mild AS.  Cardiac nuclear imaging test, low to mod risk 02/22/2016    Low to intermediate risk. Sm reversible inferoapical defect may be a sm area of ischemia. No prior infarction. EF >75%. Neg EKG on pharm stress test.    Carotid duplex 02/10/2014    Mild 1-49% bilateral ICA stenosis.      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 Cullen Sic Hypertension     Lung collapse Dec 2011    Collapse of left upper lobe    Mild aortic stenosis 8/22/2011    Osteoarthritis     Osteoporosis     Positive PPD        Past Surgical History:  Past Surgical History:   Procedure Laterality Date    HX CATARACT REMOVAL      HX COLONOSCOPY      HX HEART CATHETERIZATION  12/15/10    negative    HX HYSTERECTOMY      HX KNEE ARTHROSCOPY      HX PTCA  3/1995    of the LAD; has had no recurrent angina since    HX TONSILLECTOMY      WA BRONCHOSCOPY BRONCHIAL/ENDOBRNCL BX 1+ SITES  9/25/2012            Family History:  Family History   Problem Relation Age of Onset    Heart Disease Mother      History of CHF    Heart Disease Father     Heart Attack Father     Heart Disease Sister     Cancer Brother      non-Hodgkin lymphoma    Cancer Brother      liver cancer    Heart Disease Brother     Stroke Brother     Parkinsonism Sister     Parkinsonism Brother        Social History:  Social History   Substance Use Topics    Smoking status: Never Smoker    Smokeless tobacco: Never Used      Comment: extensive secondhand smoke exposure through job    Alcohol use No       Allergies: Allergies   Allergen Reactions    Bactrim [Sulfamethoprim Ds] Nausea and Vomiting    Vicodin [Hydrocodone-Acetaminophen] Nausea Only    Atenolol Other (comments)     interolance    Atenolol Other (comments)     Black out    Codeine Other (comments)     headache    Erythromycin Unknown (comments)    Erythromycin Hives and Itching    Other Medication Not Reported This Time     Refined sugar    Pcn [Penicillins] Hives    Penicillin G Hives and Itching         Review of Systems       Review of Systems   Constitutional: Negative for activity change, fatigue and fever. HENT: Negative for congestion and rhinorrhea.     Eyes: Negative for visual disturbance. Respiratory: Negative for shortness of breath. Cardiovascular: Negative for chest pain and palpitations. Gastrointestinal: Negative for abdominal pain, diarrhea, nausea and vomiting. Genitourinary: Negative for dysuria and hematuria. Musculoskeletal: Negative for back pain. Right hip, leg and foot pain      Skin: Negative for rash. Neurological: Negative for dizziness, weakness and light-headedness. All other systems reviewed and are negative. Physical Exam     Visit Vitals    /80 (BP 1 Location: Left arm, BP Patient Position: Sitting)    Pulse 81    Temp 97.9 °F (36.6 °C)    Resp 16    Ht 5' (1.524 m)    Wt 57.6 kg (127 lb)    SpO2 97%    BMI 24.8 kg/m2         Physical Exam   Constitutional: She is oriented to person, place, and time. She appears well-developed and well-nourished. No distress. HENT:   Head: Normocephalic and atraumatic. Right Ear: External ear normal.   Left Ear: External ear normal.   Nose: Nose normal.   Mouth/Throat: Oropharynx is clear and moist.   Eyes: Conjunctivae and EOM are normal. Pupils are equal, round, and reactive to light. No scleral icterus. Neck: Normal range of motion. Neck supple. No JVD present. No tracheal deviation present. No thyromegaly present. Cardiovascular: Normal rate, regular rhythm, normal heart sounds and intact distal pulses. Exam reveals no gallop and no friction rub. No murmur heard. Pulmonary/Chest: Effort normal and breath sounds normal. She exhibits no tenderness. Abdominal: Soft. Bowel sounds are normal. She exhibits no distension. There is no tenderness. There is no rebound and no guarding. Musculoskeletal: She exhibits tenderness. She exhibits no edema. R knee and R calf with pain, mild crepitance, pain increases with internal rotation of the hip, distal pulses are equal, gait not observed, no swelling or discoloration    Lymphadenopathy:     She has no cervical adenopathy. Neurological: She is alert and oriented to person, place, and time. No cranial nerve deficit. Coordination normal.   No sensory loss, able to ambulate with cane, Motor 5/5   Skin: Skin is warm and dry. Psychiatric: She has a normal mood and affect. Her behavior is normal. Judgment and thought content normal.   Nursing note and vitals reviewed. Diagnostic Study Results     Labs -  No results found for this or any previous visit (from the past 12 hour(s)). Radiologic Studies -   No orders to display         Medical Decision Making   I am the first provider for this patient. I reviewed the vital signs, available nursing notes, past medical history, past surgical history, family history and social history. Vital Signs-Reviewed the patient's vital signs. Pulse Oximetry Analysis -  97% on room air (Interpretation) Normal     Cardiac Monitor:  Rate: 81 bpm   Rhythm:  Normal Sinus Rhythm     Records Reviewed: Nursing Notes (Time of Review: 4:47 PM)    ED Course: Progress Notes, Reevaluation, and Consults:      Provider Notes (Medical Decision Making): Pt is an 81yo female with a hx of knee bursitis, DJD, CAD, CKD presents with R knee pain radiating to her calf and hip. Pt pain is diffuse and with daughter at the bedside we discussed evaluation options. I do not feel XR will be helpful at this time as this pain is likely due to joint disease that is non traumatic. I offered an evaluation for DVT but using shared descision making will initiate supportive care and she will follow closely with [place] Dr. Ene Sow and return if at all worsened or concerned. Dee Beasley DO 5:01 PM      Diagnosis     Clinical Impression:   1. Recurrent pain of right knee    2.  Osteoarthritis of right knee, unspecified osteoarthritis type        Disposition: Discharge     Follow-up Information     Follow up With Details Comments Leda Marte MD Call in 2 days  [facility] [address]      [facility] EMERGENCY DEPT  As needed, If symptoms worsen [address] [address]  [phone]           Patient's Medications   Start Taking    METHYLPREDNISOLONE (MEDROL, LAVONNE,) 4 MG TABLET    As directed    TRAMADOL (ULTRAM) 50 MG TABLET    Take 0.5 Tabs by mouth every six (6) hours as needed for Pain. Max Daily Amount: 100 mg. Continue Taking    AMLODIPINE (NORVASC) 10 MG TABLET    take 1 tablet by mouth daily    ASCORBIC ACID (VITAMIN C) 250 MG TABLET    Take 1 Tab by mouth two (2) times daily (with meals). [Request refills from PCP (Dr. Zoey Chapman)]    ASPIRIN 81 MG TABLET    Take 81 mg by mouth daily. CARVEDILOL (COREG) 12.5 MG TABLET    take 1 tablet by mouth twice a day    CHOLECALCIFEROL (VITAMIN D3) 1,000 UNIT TABLET    Take 2 Tabs by mouth daily. [Request refills from PCP (Dr. Zoey Chapman)]    DOCOSAHEXANOIC ACID/EPA (FISH OIL PO)    Take 3 Tabs by mouth daily. DOCUSATE SODIUM (COLACE) 100 MG CAPSULE    One capsule by mouth twice daily    HYDROCHLOROTHIAZIDE (MICROZIDE) 12.5 MG CAPSULE    take 1 capsule by mouth once daily    LACTOBACILLUS ACIDOPHILUS (ACIDOPHILUS) CAP    Take 2 Caps by mouth two (2) times a day. LOSARTAN (COZAAR) 50 MG TABLET    Take 1 Tab by mouth daily. LOVASTATIN (MEVACOR) 20 MG TABLET    Take 1 Tab by mouth daily. MULTIVITAMIN (ONE A DAY) TABLET    Take 1 Tab by mouth daily. OMEPRAZOLE (PRILOSEC) 20 MG CAPSULE    Take 20 mg by mouth daily as needed. TOLTERODINE (DETROL) 2 MG TABLET    Take 1 Tab by mouth two (2) times a day.    These Medications have changed    No medications on file   Stop Taking    DEXAMETHASONE (DECADRON) 4 MG TABLET    1 tablet by mouth twice a day    NAPROXEN SODIUM (ALEVE) 220 MG CAP    Take  by mouth.     _______________________________    Attestations:  Lee [address] [place] [address] acting as a scribe for and in the presence of Lizzie Welsh MD      December 04, 2017 at 4:47 PM       Provider Attestation:      I personally performed the services described in the documentation, reviewed the documentation, as recorded by the scribe in my presence, and it accurately and completely records my words and actions.  December 04, 2017 at 4:47 PM - Lizzie Welsh MD    _______________________________

## 2017-12-05 ENCOUNTER — PATIENT OUTREACH (OUTPATIENT)
Dept: INTERNAL MEDICINE CLINIC | Age: 82
End: 2017-12-05

## 2017-12-05 RX ORDER — CIPROFLOXACIN 250 MG/1
TABLET, FILM COATED ORAL EVERY 12 HOURS
COMMUNITY
End: 2017-12-06 | Stop reason: ALTCHOICE

## 2017-12-05 NOTE — PROGRESS NOTES
Patient verbalizes she contacted Dr. Valdemar Quiñones office and will not be able to be seen until 12/15. Patient requested PCP appt. Appt scheduled for 12/6/17 at 2 PM with PCP. Patient aware.

## 2017-12-05 NOTE — PROGRESS NOTES
Transition of care coordination/ED Admission    Patient admitted to [facility], 12/4/17 to 12/4/17 for recurrent right knee pain; osteoarthritis. Assessment for DVT was delcined. No x-rays were taken. Presenting symptoms:   Worsening right knee pain    New medications/changes to current medications:  traMADol (ULTRAM) 50 mg tablet Take 0.5 Tabs by mouth every six (6) hours as needed for Pain. methylPREDNISolone (MEDROL, LAVONNE,) 4 mg tablet  As directed    ED utilization in past 6 months: 0    Contacted patient for transition of care post ED admission. Verified 2 patient identifiers. Introduced self, role and reason for call. Patient reports:  7/10 right knee pain  Increased pain with movement  Achy, burning pain  Ambulating with cane; has rollator on hand  Fatigue  Taking Cipro for UTI    Patient denies:  Dizziness  SOB  Fever/chills  N/V  Swelling  Warmth  Redness  Urinary urgency/frequency  Burning with urination  Frequency     ADL's:  Feeds self: independent  Ambulates: with use of cane    Self grooming: independent  Toileting: independent    DME:   Cane, rollator, walk-in shower with grab bars    Support:   Spouse     Patient verbalizes she started Medrol last night. Took 1/2 tab of Tramadol at 8:30 AM. Patient reports she was treated for UTI at [facility] during the Thanksgiving holiday. Added Cipro to the med list. Patient verbalizes her plan to contact Dr. Paola Red, ortho for follow up in 2 days. Encouraged patient to contact office if unable to get appt with ortho in the next few days or if pain worsens. Opportunity to ask questions was provided. Patient has contact information for future reference or further questions.

## 2017-12-06 ENCOUNTER — OFFICE VISIT (OUTPATIENT)
Dept: INTERNAL MEDICINE CLINIC | Age: 82
End: 2017-12-06

## 2017-12-06 VITALS
TEMPERATURE: 97.5 F | DIASTOLIC BLOOD PRESSURE: 54 MMHG | WEIGHT: 126 LBS | OXYGEN SATURATION: 97 % | HEIGHT: 60 IN | SYSTOLIC BLOOD PRESSURE: 116 MMHG | RESPIRATION RATE: 12 BRPM | HEART RATE: 64 BPM | BODY MASS INDEX: 24.74 KG/M2

## 2017-12-06 DIAGNOSIS — M25.561 ACUTE PAIN OF RIGHT KNEE: Primary | ICD-10-CM

## 2017-12-06 NOTE — PROGRESS NOTES
Domingo Hameed,born 12/29/1928, is a 80 y.o. female, who is seen today for follow-up from the emergency room where she had apparently knee pain. 2 days ago she was seen in the emergency room with rather severe pain in the medial knee but she also followed up into her posterolateral hip on the right as well as all the way to the ankle. The ER doctor felt that the pain was worse with internal rotation of the hip and gave her a prescription for Medrol Dosepak which she has been using the last 2 days and she also has used half of the tramadol twice today and she thinks that helps as well. When she first got out of bed this morning was a lot better but within 15 minutes it was hurting somewhat in the knee and down into the ankle medially on the right so she did use half of her tramadol around 730 and again around 1:00, it is now 244 and the pain is a lot better. She is getting around with her cane. She has not made an appointment to see the orthopedist yet. It sounds like it was going to take a while so she made an appointment here instead. She otherwise feels well but a little woozy from the pain medicine she says. Past Medical History:   Diagnosis Date    Allergic rhinitis     Anemia     Asymptomatic carotid bruit     asymptomatic    Atrial fibrillation ([address] 04/2017    Started on Xarelto    Atrial fibrillation ([facility] [address]     Cardiac cath 12/15/2010    pRCA 30%. LM patent. LAD 25%. CX 30%. LVEDP 10 mmHg. EF 60%. Mild AS.  Cardiac nuclear imaging test, low to mod risk 02/22/2016    Low to intermediate risk. Sm reversible inferoapical defect may be a sm area of ischemia. No prior infarction. EF >75%. Neg EKG on pharm stress test.    Carotid duplex 02/10/2014    Mild 1-49% bilateral ICA stenosis.      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 Anand Iyer Hypertension     Lung collapse Dec 2011    Collapse of left upper lobe    Mild aortic stenosis 8/22/2011    Osteoarthritis     Osteoporosis     Positive PPD      Current Outpatient Prescriptions   Medication Sig Dispense Refill    traMADol (ULTRAM) 50 mg tablet Take 0.5 Tabs by mouth every six (6) hours as needed for Pain. Max Daily Amount: 100 mg. 10 Tab 0    methylPREDNISolone (MEDROL, LAVONNE,) 4 mg tablet As directed 1 Dose Pack 0    losartan (COZAAR) 50 mg tablet Take 1 Tab by mouth daily. 30 Tab 6    carvedilol (COREG) 12.5 mg tablet take 1 tablet by mouth twice a day 180 Tab 3    amLODIPine (NORVASC) 10 mg tablet take 1 tablet by mouth daily 30 Tab 6    lovastatin (MEVACOR) 20 mg tablet Take 1 Tab by mouth daily. 90 Tab 3    hydroCHLOROthiazide (MICROZIDE) 12.5 mg capsule take 1 capsule by mouth once daily 90 Cap 3    multivitamin (ONE A DAY) tablet Take 1 Tab by mouth daily.  Lactobacillus acidophilus (ACIDOPHILUS) cap Take 2 Caps by mouth two (2) times a day.  tolterodine (DETROL) 2 mg tablet Take 1 Tab by mouth two (2) times a day. 180 Tab 3    docusate sodium (COLACE) 100 mg capsule One capsule by mouth twice daily 60 Cap 3    omeprazole (PRILOSEC) 20 mg capsule Take 20 mg by mouth daily as needed.  ascorbic acid (VITAMIN C) 250 mg tablet Take 1 Tab by mouth two (2) times daily (with meals).  [Request refills from PCP ( [facility])] 30 Tab 0    cholecalciferol (VITAMIN D3) 1,000 unit tablet Take 2 Tabs by mouth daily. [Request refills from PCP (Dr. Katrin Chapman)] 30 Tab 0    aspirin 81 mg tablet Take 81 mg by mouth daily.  DOCOSAHEXANOIC ACID/EPA (FISH OIL PO) Take 3 Tabs by mouth daily. Visit Vitals    /54    Pulse 64    Temp 97.5 °F (36.4 °C) (Oral)    Resp 12    Ht 5' (1.524 m)    Wt 126 lb (57.2 kg)    SpO2 97%    BMI 24.61 kg/m2     She has normal straight leg raising. Good internal and external rotation of the right hip without pain. There is no tenderness in the knee but the area of the pain seems to be the lower medial knee. There is no tenderness in the calf or in the ankle good range of motion of the knee and ankle without pain right now. Assessment: Recent pain that may well have been from the knee but she also had symptoms suggesting possible sciatica. Either way she is a lot better mainly because of the steroids most likely and she may use the half tramadol at a time as needed as well. If the pain recurs as she finishes or after finishing the Medrol Dosepak I recommended she see Dr. Eliza Alejo as recommended by the ER doctor. This visit lasted 25 minutes, greater than 50% of the time spent counseling on the possible etiologies of her pain and the current and future plans depending on the degree of pain. Follow-up as previously planned    Tessa Sanders. Jerardo Sheth MD [facility]    Please note: This document has been produced using voice recognition software. Unrecognized errors in transcription may be present.

## 2017-12-06 NOTE — MR AVS SNAPSHOT
Visit Information Date & Time Provider Department Dept. Phone Encounter #  
 12/6/2017  2:00 PM Kath King MD [facility] [id number] Your Appointments 12/15/2017  2:50 PM  
New Patient with Khang Combs MD  
[address] [facility], [address] [facility] Appt Note: RT KNEE  
 [address] [facility], [address] [facility] [address]  
[phone]  
  
   
 [address] [facility], [address] [address]  
  
    
 1/2/2018  8:25 AM  
LAB with [facility] NURSE VISIT [facility] ([facility]) Appt Note: lab  
 [address], [address] [facility], [facility] [address]  
  
   
 [address], [address]  
  
    
 1/9/2018 10:00 AM  
Office Visit with Kath King MD  
[facility]) Appt Note: 3 month f/u  
 [address] [facility], [address] [address] [address]  
  
   
 [address], [address]  
  
    
 1/10/2018 11:00 AM  
Follow Up with Figueroa Kim MD  
[facility] ([facility]) Appt Note: F/U 6 months [place] [address] [address] [address] [phone] [facility]  
  
    
  
 4/2/2018 10:00 AM  
Any with Janet Will MD  
[facility] ([facility]) Appt Note: 6m fu  
 [address] 
[address] [place] [address]  
[address] [facility] [address] 3b [facility] [address] Upcoming Health Maintenance Date Due  
 FOOT EXAM Q1 1/30/2018* HEMOGLOBIN A1C Q6M 2/18/2018 EYE EXAM RETINAL OR DILATED Q1 3/21/2018 LIPID PANEL Q1 8/18/2018 MICROALBUMIN Q1 8/31/2018 MEDICARE YEARLY EXAM 9/1/2018 GLAUCOMA SCREENING Q2Y 3/21/2019 DTaP/Tdap/Td series (2 - Td) 8/31/2027 *Topic was postponed. The date shown is not the original due date. Allergies as of 12/6/2017  Review Complete On: 12/6/2017 By: Domo Andres MD  
  
 Severity Noted Reaction Type Reactions Bactrim [Sulfamethoprim Ds] High 02/11/2013   Systemic Nausea and Vomiting Vicodin [Hydrocodone-acetaminophen] Medium 10/03/2012   Side Effect Nausea Only Atenolol    Other (comments)  
 interolance Atenolol  05/09/2017    Other (comments) Black out Codeine  05/09/2017    Other (comments)  
 headache Erythromycin    Unknown (comments) Erythromycin  05/09/2017    Hives, Itching Other Medication    Not Reported This Time Refined sugar Pcn [Penicillins]    Hives Penicillin G  05/09/2017    Hives, Itching Current Immunizations  Reviewed on 8/31/2017 Name Date Influenza High Dose Vaccine PF 8/31/2017 10:27 AM, 8/26/2016 10:11 AM, 10/12/2015 11:23 AM, 9/10/2014  3:33 PM  
 Influenza Vaccine 10/24/2013 Influenza Vaccine Split 10/28/2011 Influenza Vaccine Whole 10/25/2010 Pneumococcal Conjugate (PCV-13) 4/21/2016 10:28 AM  
 Pneumococcal Polysaccharide (PPSV-23) 4/28/2017  9:07 AM  
 TB Skin Test (PPD) 1/1/1992 Not reviewed this visit You Were Diagnosed With   
  
 Codes Comments Acute pain of right knee    -  Primary ICD-10-CM: M25.561 ICD-9-CM: 719.46 Vitals BP Pulse Temp Resp Height(growth percentile) Weight(growth percentile) 116/54 64 97.5 °F (36.4 °C) (Oral) 12 5' (1.524 m) 126 lb (57.2 kg) SpO2 BMI OB Status Smoking Status 97% 24.61 kg/m2 Hysterectomy Never Smoker Vitals History BMI and BSA Data Body Mass Index Body Surface Area  
 24.61 kg/m 2 1.56 m 2 Preferred Pharmacy Pharmacy Name Phone [facility] [address] [facility], [address] [facility] [phone] Your Updated Medication List  
  
   
This list is accurate as of: 12/6/17  2:45 PM.  Always use your most recent med list.  
  
  
  
  
 ACIDOPHILUS Cap Generic drug:  Lactobacillus acidophilus Take 2 Caps by mouth two (2) times a day. amLODIPine 10 mg tablet Commonly known as:  NORVASC  
take 1 tablet by mouth daily  
  
 ascorbic acid (vitamin C) 250 mg tablet Commonly known as:  VITAMIN C Take 1 Tab by mouth two (2) times daily (with meals). [Request refills from PCP (Dr. Katrin Chapman)] aspirin 81 mg tablet Take 81 mg by mouth daily. carvedilol 12.5 mg tablet Commonly known as:  COREG  
take 1 tablet by mouth twice a day  
  
 cholecalciferol 1,000 unit tablet Commonly known as:  VITAMIN D3 Take 2 Tabs by mouth daily. [Request refills from PCP (Dr. Katrin Chapman)] docusate sodium 100 mg capsule Commonly known as:  Lucetta Oviedo One capsule by mouth twice daily FISH OIL PO Take 3 Tabs by mouth daily. hydroCHLOROthiazide 12.5 mg capsule Commonly known as:  MICROZIDE  
take 1 capsule by mouth once daily  
  
 losartan 50 mg tablet Commonly known as:  COZAAR Take 1 Tab by mouth daily. lovastatin 20 mg tablet Commonly known as:  MEVACOR Take 1 Tab by mouth daily. methylPREDNISolone 4 mg tablet Commonly known as:  MEDROL (LAVONNE) As directed  
  
 multivitamin tablet Commonly known as:  ONE A DAY Take 1 Tab by mouth daily. PriLOSEC 20 mg capsule Generic drug:  omeprazole Take 20 mg by mouth daily as needed. tolterodine 2 mg tablet Commonly known as:  Sherol Ficks Take 1 Tab by mouth two (2) times a day. traMADol 50 mg tablet Commonly known as:  ULTRAM  
Take 0.5 Tabs by mouth every six (6) hours as needed for Pain. Max Daily Amount: 100 mg. Introducing [facility] & HEALTH SERVICES! [facility] introduces [employer] patient portal. Now you can access parts of your medical record, email your doctor's office, and request medication refills online.    
 
1. In your internet browser, go to [email or web address] 2. Click on the First Time User? Click Here link in the Sign In box. You will see the New Member Sign Up page. 3. Enter your [employer] Access Code exactly as it appears below. You will not need to use this code after youve completed the sign-up process. If you do not sign up before the expiration date, you must request a new code. · [employer] Access Code: [email or web address] Expires: 3/4/2018  5:12 PM 
 
4. Enter the last four digits of your Social Security Number (xxxx) and Date of Birth (mm/dd/yyyy) as indicated and click Submit. You will be taken to the next sign-up page. 5. Create a KeyedIn Solutionst ID. This will be your [employer] login ID and cannot be changed, so think of one that is secure and easy to remember. 6. Create a [employer] password. You can change your password at any time. 7. Enter your Password Reset Question and Answer. This can be used at a later time if you forget your password. 8. Enter your e-mail address. You will receive e-mail notification when new information is available in [address] 9. Click Sign Up. You can now view and download portions of your medical record. 10. Click the Download Summary menu link to download a portable copy of your medical information. If you have questions, please visit the Frequently Asked Questions section of the [employer] website. Remember, [employer] is NOT to be used for urgent needs. For medical emergencies, dial 911. Now available from your iPhone and Android! Please provide this summary of care documentation to your next provider. Your primary care clinician is listed as Silvestre Ackerman. If you have any questions after today's visit, please call [phone].

## 2017-12-25 DIAGNOSIS — E78.5 HYPERLIPIDEMIA LDL GOAL <100: ICD-10-CM

## 2018-01-02 ENCOUNTER — HOSPITAL ENCOUNTER (OUTPATIENT)
Dept: LAB | Age: 83
Discharge: HOME | End: 2018-01-02
Payer: MEDICARE

## 2018-01-02 LAB
ALBUMIN SERPL-MCNC: 4 G/DL (ref 3.4–5)
ALBUMIN/GLOB SERPL: 1.3 {RATIO} (ref 0.8–1.7)
ALP SERPL-CCNC: 72 U/L (ref 45–117)
ALT SERPL-CCNC: 41 U/L (ref 13–56)
ANION GAP SERPL CALC-SCNC: 7 MMOL/L (ref 3–18)
AST SERPL-CCNC: 29 U/L (ref 15–37)
BILIRUB SERPL-MCNC: 0.8 MG/DL (ref 0.2–1)
BUN SERPL-MCNC: 31 MG/DL (ref 7–18)
BUN/CREAT SERPL: 39 (ref 12–20)
CALCIUM SERPL-MCNC: 9.3 MG/DL (ref 8.5–10.1)
CHLORIDE SERPL-SCNC: 104 MMOL/L (ref 100–108)
CHOLEST SERPL-MCNC: 165 MG/DL
CO2 SERPL-SCNC: 31 MMOL/L (ref 21–32)
CREAT SERPL-MCNC: 0.8 MG/DL (ref 0.6–1.3)
GLOBULIN SER CALC-MCNC: 3.1 G/DL (ref 2–4)
GLUCOSE SERPL-MCNC: 80 MG/DL (ref 74–99)
HDLC SERPL-MCNC: 95 MG/DL (ref 40–60)
HDLC SERPL: 1.7 {RATIO} (ref 0–5)
LDLC SERPL CALC-MCNC: 57.4 MG/DL (ref 0–100)
LIPID PROFILE,FLP: ABNORMAL
POTASSIUM SERPL-SCNC: 4.4 MMOL/L (ref 3.5–5.5)
PROT SERPL-MCNC: 7.1 G/DL (ref 6.4–8.2)
SODIUM SERPL-SCNC: 142 MMOL/L (ref 136–145)
TRIGL SERPL-MCNC: 63 MG/DL (ref ?–150)
VLDLC SERPL CALC-MCNC: 12.6 MG/DL

## 2018-01-02 PROCEDURE — 80061 LIPID PANEL: CPT | Performed by: INTERNAL MEDICINE

## 2018-01-02 PROCEDURE — 80053 COMPREHEN METABOLIC PANEL: CPT | Performed by: INTERNAL MEDICINE

## 2018-01-02 PROCEDURE — 36415 COLL VENOUS BLD VENIPUNCTURE: CPT | Performed by: INTERNAL MEDICINE

## 2018-01-05 ENCOUNTER — PATIENT OUTREACH (OUTPATIENT)
Dept: INTERNAL MEDICINE CLINIC | Age: 83
End: 2018-01-05

## 2018-01-10 ENCOUNTER — OFFICE VISIT (OUTPATIENT)
Dept: CARDIOLOGY CLINIC | Age: 83
End: 2018-01-10

## 2018-01-10 ENCOUNTER — OFFICE VISIT (OUTPATIENT)
Dept: INTERNAL MEDICINE CLINIC | Age: 83
End: 2018-01-10

## 2018-01-10 VITALS
HEART RATE: 84 BPM | SYSTOLIC BLOOD PRESSURE: 120 MMHG | HEIGHT: 60 IN | TEMPERATURE: 97.6 F | RESPIRATION RATE: 12 BRPM | WEIGHT: 130 LBS | DIASTOLIC BLOOD PRESSURE: 54 MMHG | OXYGEN SATURATION: 98 % | BODY MASS INDEX: 25.52 KG/M2

## 2018-01-10 VITALS
HEIGHT: 60 IN | DIASTOLIC BLOOD PRESSURE: 80 MMHG | HEART RATE: 85 BPM | SYSTOLIC BLOOD PRESSURE: 140 MMHG | WEIGHT: 130 LBS | OXYGEN SATURATION: 98 % | BODY MASS INDEX: 25.52 KG/M2

## 2018-01-10 DIAGNOSIS — I25.10 ATHEROSCLEROSIS OF NATIVE CORONARY ARTERY OF NATIVE HEART WITHOUT ANGINA PECTORIS: Chronic | ICD-10-CM

## 2018-01-10 DIAGNOSIS — M79.605 LEG PAIN, LEFT: ICD-10-CM

## 2018-01-10 DIAGNOSIS — E78.5 HYPERLIPIDEMIA LDL GOAL <100: ICD-10-CM

## 2018-01-10 DIAGNOSIS — I10 ESSENTIAL HYPERTENSION WITH GOAL BLOOD PRESSURE LESS THAN 140/90: Primary | ICD-10-CM

## 2018-01-10 DIAGNOSIS — I48.20 CHRONIC ATRIAL FIBRILLATION (HCC): ICD-10-CM

## 2018-01-10 DIAGNOSIS — R06.09 DOE (DYSPNEA ON EXERTION): Primary | ICD-10-CM

## 2018-01-10 DIAGNOSIS — I51.9 DIASTOLIC DYSFUNCTION, LEFT VENTRICLE: ICD-10-CM

## 2018-01-10 DIAGNOSIS — I35.0 MILD AORTIC STENOSIS: Chronic | ICD-10-CM

## 2018-01-10 DIAGNOSIS — I10 ESSENTIAL HYPERTENSION: Chronic | ICD-10-CM

## 2018-01-10 DIAGNOSIS — I48.0 PAF (PAROXYSMAL ATRIAL FIBRILLATION) (HCC): ICD-10-CM

## 2018-01-10 RX ORDER — CHOLECALCIFEROL (VITAMIN D3) 125 MCG
CAPSULE ORAL
COMMUNITY
End: 2018-08-31

## 2018-01-10 NOTE — PROGRESS NOTES
1. Have you been to the ER, urgent care clinic since your last visit? Hospitalized since your last visit? ER 12/4/17 knee pain  2. Have you seen or consulted any other health care providers outside of the [address] [place] [address] since your last visit? Include any pap smears or colon screening.  no

## 2018-01-10 NOTE — MR AVS SNAPSHOT
Visit Information Date & Time Provider Department Dept. Phone Encounter #  
 1/10/2018  1:30 PM Renzo Caal MD [facility] [address] [place] [address] Road [phone] [id number]  
  
 4/2/2018 10:00 AM  
Any with Maggie Saenz MD  
[facility] ([address]) Appt Note: 6m fu  
 [address] 
[address] [place] [address]  
[address] [facility] [address] Upcoming Health Maintenance Date Due  
 FOOT EXAM Q1 1/30/2018* HEMOGLOBIN A1C Q6M 2/18/2018 EYE EXAM RETINAL OR DILATED Q1 3/21/2018 MICROALBUMIN Q1 8/31/2018 MEDICARE YEARLY EXAM 9/1/2018 LIPID PANEL Q1 1/2/2019 GLAUCOMA SCREENING Q2Y 3/21/2019 DTaP/Tdap/Td series (2 - Td) 8/31/2027 *Topic was postponed. The date shown is not the original due date. Allergies as of 1/10/2018  Review Complete On: 1/10/2018 By: Renzo Caal MD  
  
 Severity Noted Reaction Type Reactions Bactrim [Sulfamethoprim Ds] High 02/11/2013   Systemic Nausea and Vomiting Vicodin [Hydrocodone-acetaminophen] Medium 10/03/2012   Side Effect Nausea Only Atenolol    Other (comments)  
 interolance Atenolol  05/09/2017    Other (comments) Black out Codeine  05/09/2017    Other (comments)  
 headache Erythromycin    Unknown (comments) Erythromycin  05/09/2017    Hives, Itching Other Medication    Not Reported This Time Refined sugar Pcn [Penicillins]    Hives Penicillin G  05/09/2017    Hives, Itching Current Immunizations  Reviewed on 8/31/2017 Name Date Influenza High Dose Vaccine PF 8/31/2017 10:27 AM, 8/26/2016 10:11 AM, 10/12/2015 11:23 AM, 9/10/2014  3:33 PM  
 Influenza Vaccine 10/24/2013 Influenza Vaccine Split 10/28/2011 Influenza Vaccine Whole 10/25/2010 Pneumococcal Conjugate (PCV-13) 4/21/2016 10:28 AM  
 Pneumococcal Polysaccharide (PPSV-23) 4/28/2017  9:07 AM  
 TB Skin Test (PPD) 1/1/1992 Not reviewed this visit You Were Diagnosed With   
  
 Codes Comments Essential hypertension with goal blood pressure less than 140/90    -  Primary ICD-10-CM: I10 
ICD-9-CM: 401.9 Hyperlipidemia LDL goal <100     ICD-10-CM: E78.5 ICD-9-CM: 272.4 PAF (paroxysmal atrial fibrillation) (HCC)     ICD-10-CM: I48.0 ICD-9-CM: 427.31 Leg pain, left     ICD-10-CM: M79.605 ICD-9-CM: 729.5 Vitals BP Pulse Temp Resp Height(growth percentile) Weight(growth percentile) 120/54 84 97.6 °F (36.4 °C) (Oral) 12 5' (1.524 m) 130 lb (59 kg) SpO2 BMI OB Status Smoking Status 98% 25.39 kg/m2 Hysterectomy Never Smoker Vitals History BMI and BSA Data Body Mass Index Body Surface Area  
 25.39 kg/m 2 1.58 m 2 Preferred Pharmacy Pharmacy Name Phone [facility] [address] [facility], [address] [place] [address] [phone] Your Updated Medication List  
  
   
This list is accurate as of: 1/10/18  2:22 PM.  Always use your most recent med list.  
  
  
  
  
 ACIDOPHILUS Cap Generic drug:  Lactobacillus acidophilus Take 2 Caps by mouth two (2) times a day. ALEVE 220 mg Cap Generic drug:  naproxen sodium Take  by mouth. amLODIPine 10 mg tablet Commonly known as:  NORVASC  
take 1 tablet by mouth daily  
  
 ascorbic acid (vitamin C) 250 mg tablet Commonly known as:  VITAMIN C Take 1 Tab by mouth two (2) times daily (with meals). [Request refills from PCP (Dr. Cody Chapman)] aspirin 81 mg tablet Take 81 mg by mouth daily. carvedilol 12.5 mg tablet Commonly known as:  COREG  
take 1 tablet by mouth twice a day  
  
 cholecalciferol 1,000 unit tablet Commonly known as:  VITAMIN D3 Take 2 Tabs by mouth daily. [Request refills from PCP (Dr. Cody Chapman)] docusate sodium 100 mg capsule Commonly known as:  Mozelle Clause One capsule by mouth twice daily  FISH OIL PO  
 Take 3 Tabs by mouth daily. hydroCHLOROthiazide 12.5 mg capsule Commonly known as:  MICROZIDE  
take 1 capsule by mouth once daily  
  
 losartan 50 mg tablet Commonly known as:  COZAAR Take 1 Tab by mouth daily. lovastatin 20 mg tablet Commonly known as:  MEVACOR Take 1 Tab by mouth daily. multivitamin tablet Commonly known as:  ONE A DAY Take 1 Tab by mouth daily. PriLOSEC 20 mg capsule Generic drug:  omeprazole Take 20 mg by mouth daily as needed. tolterodine 2 mg tablet Commonly known as:  Cindy Jurist Take 1 Tab by mouth two (2) times a day. traMADol 50 mg tablet Commonly known as:  ULTRAM  
Take 0.5 Tabs by mouth every six (6) hours as needed for Pain. Max Daily Amount: 100 mg. We Performed the Following REFERRAL TO ORTHOPEDICS [VXO647 Custom] Comments: She would like an appointment with Dr. Octavio Irving or Dr. She Ayala for left leg pain after injury. To-Do List   
 Around 05/03/2018 Lab:  CBC WITH AUTOMATED DIFF Around 05/03/2018 Lab:  LIPID PANEL Around 05/03/2018 Lab:  METABOLIC PANEL, COMPREHENSIVE Around 05/03/2018 Lab:  URINALYSIS W/ RFLX MICROSCOPIC Referral Information Referral ID Referred By Referred To  
  
 [id number] Kavitha Ricardo MD   
   [address] [facility] [address] [facility] [place] [facility], [address] Phone: [phone] Fax: [phone] Visits Status Start Date End Date 1 New Request 1/10/18 1/10/19 If your referral has a status of pending review or denied, additional information will be sent to support the outcome of this decision. Introducing [facility] & HEALTH SERVICES! Lianna Berry introduces [employer] patient portal. Now you can access parts of your medical record, email your doctor's office, and request medication refills online.    
 
1. In your internet browser, go to [email or web address] 2. Click on the First Time User? Click Here link in the Sign In box. You will see the New Member Sign Up page. 3. Enter your [employer] Access Code exactly as it appears below. You will not need to use this code after youve completed the sign-up process. If you do not sign up before the expiration date, you must request a new code. · [employer] Access Code: [email or web address] Expires: 3/4/2018  5:12 PM 
 
4. Enter the last four digits of your Social Security Number (xxxx) and Date of Birth (mm/dd/yyyy) as indicated and click Submit. You will be taken to the next sign-up page. 5. Create a Mailjett ID. This will be your [employer] login ID and cannot be changed, so think of one that is secure and easy to remember. 6. Create a [employer] password. You can change your password at any time. 7. Enter your Password Reset Question and Answer. This can be used at a later time if you forget your password. 8. Enter your e-mail address. You will receive e-mail notification when new information is available in [address] 9. Click Sign Up. You can now view and download portions of your medical record. 10. Click the Download Summary menu link to download a portable copy of your medical information. If you have questions, please visit the Frequently Asked Questions section of the [employer] website. Remember, [employer] is NOT to be used for urgent needs. For medical emergencies, dial 911. Now available from your iPhone and Android! Please provide this summary of care documentation to your next provider. Your primary care clinician is listed as Jessica Hood. Christopher Riedel. If you have any questions after today's visit, please call [phone].

## 2018-01-10 NOTE — PROGRESS NOTES
Elio Hameed,born 12/29/1928, is a 80 y.o. female, who is seen today for reevaluation of atherosclerotic heart disease hypertension hyperlipidemia and other issues. She had fallen and injured her left leg including her knee and up to her hip and went through physical therapy but is still hurts. She had seen Dr. Alex Richards but would like to see Dr. Octavio Irving or Dr. She Ayala for follow-up. She is otherwise feeling well with no chest pain or dyspnea. Past Medical History:   Diagnosis Date    Allergic rhinitis     Anemia     Asymptomatic carotid bruit     asymptomatic    Atrial fibrillation ([facility] 04/2017    Started on Xarelto    Atrial fibrillation ([facility] [address]     Cardiac cath 12/15/2010    pRCA 30%. LM patent. LAD 25%. CX 30%. LVEDP 10 mmHg. EF 60%. Mild AS.  Cardiac nuclear imaging test, low to mod risk 02/22/2016    Low to intermediate risk. Sm reversible inferoapical defect may be a sm area of ischemia. No prior infarction. EF >75%. Neg EKG on pharm stress test.    Carotid duplex 02/10/2014    Mild 1-49% bilateral ICA stenosis.      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 Colleen Hawkins Hypertension     Lung collapse Dec 2011    Collapse of left upper lobe    Mild aortic stenosis 8/22/2011    Osteoarthritis     Osteoporosis     Positive PPD Current Outpatient Prescriptions   Medication Sig Dispense Refill    naproxen sodium (ALEVE) 220 mg cap Take  by mouth.  traMADol (ULTRAM) 50 mg tablet Take 0.5 Tabs by mouth every six (6) hours as needed for Pain. Max Daily Amount: 100 mg. 10 Tab 0    losartan (COZAAR) 50 mg tablet Take 1 Tab by mouth daily. 30 Tab 6    carvedilol (COREG) 12.5 mg tablet take 1 tablet by mouth twice a day 180 Tab 3    amLODIPine (NORVASC) 10 mg tablet take 1 tablet by mouth daily 30 Tab 6    lovastatin (MEVACOR) 20 mg tablet Take 1 Tab by mouth daily. 90 Tab 3    hydroCHLOROthiazide (MICROZIDE) 12.5 mg capsule take 1 capsule by mouth once daily 90 Cap 3    multivitamin (ONE A DAY) tablet Take 1 Tab by mouth daily.  Lactobacillus acidophilus (ACIDOPHILUS) cap Take 2 Caps by mouth two (2) times a day.  tolterodine (DETROL) 2 mg tablet Take 1 Tab by mouth two (2) times a day. 180 Tab 3    docusate sodium (COLACE) 100 mg capsule One capsule by mouth twice daily 60 Cap 3    omeprazole (PRILOSEC) 20 mg capsule Take 20 mg by mouth daily as needed.  ascorbic acid (VITAMIN C) 250 mg tablet Take 1 Tab by mouth two (2) times daily (with meals). [Request refills from PCP (Dr. Anaid Chapman)] 30 Tab 0    cholecalciferol (VITAMIN D3) 1,000 unit tablet Take 2 Tabs by mouth daily. [Request refills from PCP (Dr. Anaid Chapman)] 30 Tab 0    aspirin 81 mg tablet Take 81 mg by mouth daily.  DOCOSAHEXANOIC ACID/EPA (FISH OIL PO) Take 3 Tabs by mouth daily. Visit Vitals    /54    Pulse 84    Temp 97.6 °F (36.4 °C) (Oral)    Resp 12    Ht 5' (1.524 m)    Wt 130 lb (59 kg)    SpO2 98%    BMI 25.39 kg/m2     Carotids are 2+ without bruits. Lungs are clear to percussion. Good breath sounds with no wheezing or crackles. Heart reveals an irregularly irregular rhythm with no murmur gallop click or rub. Apical impulse is not palpable.   Abdomen is soft and nontender with no hepatosplenomegaly or masses and no bruits. Extremities reveal no clubbing cyanosis or edema. Pulses are 2+. She does have pain and stiffness in the left knee but radiation up into the hip area through the muscles. Results for orders placed or performed during the hospital encounter of 01/02/18   LIPID PANEL   Result Value Ref Range    LIPID PROFILE          Cholesterol, total 165 <200 MG/DL    Triglyceride 63 <150 MG/DL    HDL Cholesterol 95 (H) 40 - 60 MG/DL    LDL, calculated 57.4 0 - 100 MG/DL    VLDL, calculated 12.6 MG/DL    CHOL/HDL Ratio 1.7 0 - 5.0     METABOLIC PANEL, COMPREHENSIVE   Result Value Ref Range    Sodium 142 136 - 145 mmol/L    Potassium 4.4 3.5 - 5.5 mmol/L    Chloride 104 100 - 108 mmol/L    CO2 31 21 - 32 mmol/L    Anion gap 7 3.0 - 18 mmol/L    Glucose 80 74 - 99 mg/dL    BUN 31 (H) 7.0 - 18 MG/DL    Creatinine 0.80 0.6 - 1.3 MG/DL    BUN/Creatinine ratio 39 (H) 12 - 20      GFR est AA >60 >60 ml/min/1.73m2    GFR est non-AA >60 >60 ml/min/1.73m2    Calcium 9.3 8.5 - 10.1 MG/DL    Bilirubin, total 0.8 0.2 - 1.0 MG/DL    ALT (SGPT) 41 13 - 56 U/L    AST (SGOT) 29 15 - 37 U/L    Alk. phosphatase 72 45 - 117 U/L    Protein, total 7.1 6.4 - 8.2 g/dL    Albumin 4.0 3.4 - 5.0 g/dL    Globulin 3.1 2.0 - 4.0 g/dL    A-G Ratio 1.3 0.8 - 1.7       Assessment: #1. Atherosclerotic heart disease doing well ever since blockage of her coronary in 1997. She will continue aspirin and carvedilol. #2. Hyperlipidemia doing very well. She will continue lovastatin 20 mg daily. #3. Hypertension controlled. She will continue losartan 50 mg daily hydrochlorothiazide 12.5 mg daily and amlodipine 10 mg daily. #4.  Left leg pain after previous fall, she has received physical therapy but still has a lot of pain and uses her cane or walker. Will refer to Westley Butler or Aly Zhang. Follow-up in 4 months for complete evaluation    Barry Conte MD FACP    Please note:   This document has been produced using voice recognition software. Unrecognized errors in transcription may be present.

## 2018-01-10 NOTE — PROGRESS NOTES
HISTORY OF PRESENT ILLNESS  Aron Dhaliwal is a 80 y.o. female. HPI  She is complaining of easy fatigability and dyspnea on exertion. However, she is still maintaining her house. She does cleaning, cooking and washing. Her activity level seems to be above average for age 80. She denies resting dyspnea, orthopnea, PND. She has occasional palpitation but no prolonged palpitation. She has had no chest pain. She has had no dizziness or syncope. She denies any symptoms of TIA or amaurosis fugax. She has a history of successful PTCA of the LAD in March 1995 and has had no recurrent angina ever since. She had a repeat cardiac catheterization in March 1995 for chest pain, which revealed no evidence of restenosis . Her noninvasive carotid studies on March 19, 2007, demonstrated 50% stenosis of the distal right internal carotid artery, which was not severe enough to warrant surgical intervention. She underwent stress nuclear cardiac imaging on May 11, 2009, which was a normal imaging, with no evidence of scarring or ischemia and no interval change in comparison to the study of March 2007.    Because of the continued chest pain despite the negative stress and EKG and cardiac imaging, she underwent the cardiac catheterization on 12/15/2010, which was reviewed.    1. Patient dominant RCA with the 30% of proximal stenosis. 2. Patent left main trunk. .  3. Patient LAD with the diffuse 20 to 30 % narrowing throughout. 4. Patient circumflex artery with diffuse 30% stenosis in the mid segment. 5. Normal left ventricular systolic function with EF in the 60% range. 6. Mild aortic stenosis with the pressure gradient of 10 to 20 mmHg on pullback. It was felt that her chest pain was noncardiac in nature, and the continued medical treatment was recommended.    [facility] was [facility] on 12/4/11 with hemoptysis.  She was evaluated by endoscopy examination and found to have a hiatal hernia but no source of bleeding. She was subsequently evaluated by pulmonology and was found to have obstructive lesion of left upper lobe. A bronchoscopy was done on 12/5/11 which demonstrated obstructive upper left lobe with hemorrhage of entire bronchial tree suggestive of hemoptysis rather than hematemesis from the GI system. 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 She has been under the care of Dr. Charletta Kanner for hemoptysis and abnormal CT scan and chest X-ray but no decision has been made thus far. She underwent endoscopic examination on 10/21/13 and was found to have gastric ulcers, which were treated with Nexium. She is now just taking Prilosec occasionally. She has had stress nuclear cardiac imaging on 2/10/14, which demonstrated normal perfusion with no evidence of scarring or ischemia. The ejection fraction was in the 80% range. She underwent noninvasive carotid study to evaluate asymptomatic carotid bruit on 2/10/14, which demonstrated bilateral 1-49% stenosis of the internal carotid arteries, which was felt to be not severe enough to cause any symptoms or warrant treatment.    She continues to be followed by a pulmonologist for some mass like lesion in the lungs by chest X-ray and CT scan, but it has been negative for malignancy thus far. She has had occasional hemoptysis and it is not clear if it ever has been related to the lung lesions. She is now under the care of Dr. Lydia Salter, as Dr. Charletta Kanner has retired.    She underwent stress nuclear cardiac imaging on 02/22/16, which demonstrated a very small focal area of mild ischemia in the inferior apex. LV function was normal with EF in the 75% range.    For the evaluation of her chest pain, she underwent stress nuclear cardiac imaging on 03/02/2017, which demonstrated normal perfusion with no evidence of scarring or ischemia.  Ejection fraction was greater than 70%. Sonali Thomas underwent a Holter monitor on 03/06/2017 to evaluate the heart rate response, which demonstrated baseline atrial fibrillation with a minimum heart rate of 58 and a maximum heart rate of 141 with an average heart rate of 88.  There were no significant pauses.  Her BUN/creatinine were 36 and 0.81 on 02/28/2017. Review of Systems   Constitutional: Positive for malaise/fatigue. Negative for weight loss. HENT: Negative for hearing loss. Eyes: Negative for blurred vision and double vision. Respiratory: Positive for shortness of breath. Cardiovascular: Positive for palpitations. Negative for chest pain, orthopnea, claudication, leg swelling and PND. Gastrointestinal: Negative for blood in stool, heartburn and melena. Genitourinary: Negative for dysuria, frequency, hematuria and urgency. Musculoskeletal: Negative for back pain and joint pain. Skin: Negative for itching and rash. Neurological: Negative for dizziness, loss of consciousness and weakness. Psychiatric/Behavioral: Negative for depression and memory loss. Physical Exam   Constitutional: She is oriented to person, place, and time. She appears well-developed and well-nourished. HENT:   Head: Normocephalic and atraumatic. Eyes: Conjunctivae are normal. Pupils are equal, round, and reactive to light. Neck: Normal range of motion. Neck supple. No JVD present. Cardiovascular: Normal rate, S1 normal and S2 normal.  An irregularly irregular rhythm present. No extrasystoles are present. PMI is not displaced. Exam reveals no gallop and no friction rub. Murmur heard. Harsh early systolic murmur is present with a grade of 1/6  at the upper right sternal border radiating to the neck  Pulses:       Carotid pulses are 3+ on the right side with bruit, and 3+ on the left side with bruit. Pulmonary/Chest: Effort normal. She has no rales. Abdominal: Soft. There is no tenderness. Musculoskeletal: She exhibits no edema. Neurological: She is alert and oriented to person, place, and time. No cranial nerve deficit. Skin: Skin is warm and dry. Psychiatric: She has a normal mood and affect. Her behavior is normal.     Visit Vitals    /80 (BP 1 Location: Left arm, BP Patient Position: Sitting)    Pulse 85    Ht 5' (1.524 m)    Wt 59 kg (130 lb)    SpO2 98%    BMI 25.39 kg/m2       Past Medical History:   Diagnosis Date    Allergic rhinitis     Anemia     Asymptomatic carotid bruit     asymptomatic    Atrial fibrillation (HCC) 04/2017    Started on Xarelto    Atrial fibrillation ([facility] [address]     Cardiac cath 12/15/2010    pRCA 30%. LM patent. LAD 25%. CX 30%. LVEDP 10 mmHg. EF 60%. Mild AS.  Cardiac nuclear imaging test, low to mod risk 02/22/2016    Low to intermediate risk. Sm reversible inferoapical defect may be a sm area of ischemia. No prior infarction. EF >75%. Neg EKG on pharm stress test.    Carotid duplex 02/10/2014    Mild 1-49% bilateral ICA stenosis.       Chronic anemia     Chronic kidney disease     CKD (chronic kidney disease) stage 2, GFR 60-89 ml/min     Closed bilateral fracture of pubic rami [facility] 5/15/2014    Acute pubic bone fracture on both sides of the symphysis pubis    Coronary artery disease     Status post PTCA of the LAD in March 1995/ EF 70%    Coronary artery disease     Diabetes mellitus ([facility] [address]     has had elevated BS from time to time    Dyslipidemia     Dysphagia     Gastroesophageal reflux disease     Gross hematuria     Heart problem     Histoplasmosis Oct 2012    With probable bronchiectasis and localized AV malformations at left upper lobe with recurrent hemoptysis    History of peptic ulcer     Dr. Gerard Cartwright Hypertension     Lung collapse Dec 2011    Collapse of left upper lobe    Mild aortic stenosis 8/22/2011    Osteoarthritis     Osteoporosis     Positive PPD        Social History     Social History    Marital status:      Spouse name: N/A    Number of children: N/A    Years of education: N/A     Occupational History    Not on file. Social History Main Topics    Smoking status: Never Smoker    Smokeless tobacco: Never Used      Comment: extensive secondhand smoke exposure through job    Alcohol use No    Drug use: No    Sexual activity: No     Other Topics Concern    Not on file     Social History Narrative    ** Merged History Encounter **            Family History   Problem Relation Age of Onset    Heart Disease Mother      History of CHF    Heart Disease Father     Heart Attack Father     Heart Disease Sister     Cancer Brother      non-Hodgkin lymphoma    Cancer Brother      liver cancer    Heart Disease Brother     Stroke Brother     Parkinsonism Sister     Parkinsonism Brother        Past Surgical History:   Procedure Laterality Date    HX CATARACT REMOVAL      HX COLONOSCOPY      HX HEART CATHETERIZATION  12/15/10    negative    HX HYSTERECTOMY      HX KNEE ARTHROSCOPY      HX PTCA  3/1995    of the LAD; has had no recurrent angina since    HX TONSILLECTOMY      IL BRONCHOSCOPY BRONCHIAL/ENDOBRNCL BX 1+ SITES  9/25/2012            Current Outpatient Prescriptions   Medication Sig Dispense Refill    naproxen sodium (ALEVE) 220 mg cap Take  by mouth.  traMADol (ULTRAM) 50 mg tablet Take 0.5 Tabs by mouth every six (6) hours as needed for Pain. Max Daily Amount: 100 mg. 10 Tab 0    methylPREDNISolone (MEDROL, LAVONNE,) 4 mg tablet As directed 1 Dose Pack 0    losartan (COZAAR) 50 mg tablet Take 1 Tab by mouth daily. 30 Tab 6    carvedilol (COREG) 12.5 mg tablet take 1 tablet by mouth twice a day 180 Tab 3    amLODIPine (NORVASC) 10 mg tablet take 1 tablet by mouth daily 30 Tab 6    lovastatin (MEVACOR) 20 mg tablet Take 1 Tab by mouth daily.  90 Tab 3    hydroCHLOROthiazide (MICROZIDE) 12.5 mg capsule take 1 capsule by mouth once daily 90 Cap 3    multivitamin (ONE A DAY) tablet Take 1 Tab by mouth daily.      Lactobacillus acidophilus (ACIDOPHILUS) cap Take 2 Caps by mouth two (2) times a day.  tolterodine (DETROL) 2 mg tablet Take 1 Tab by mouth two (2) times a day. 180 Tab 3    docusate sodium (COLACE) 100 mg capsule One capsule by mouth twice daily 60 Cap 3    omeprazole (PRILOSEC) 20 mg capsule Take 20 mg by mouth daily as needed.  ascorbic acid (VITAMIN C) 250 mg tablet Take 1 Tab by mouth two (2) times daily (with meals). [[facility] 30 Tab 0    cholecalciferol (VITAMIN D3) 1,000 unit tablet Take 2 Tabs by mouth daily. [Request refills from PCP ( [facility])] 30 Tab 0    aspirin 81 mg tablet Take 81 mg by mouth daily.  DOCOSAHEXANOIC ACID/EPA (FISH OIL PO) Take 3 Tabs by mouth daily. EKG: unchanged from previous tracings, atrial fibrillation, rate controlled  . ASSESSMENT and PLAN  Encounter Diagnoses   Name Primary?  WILSON (dyspnea on exertion) Yes    Chronic atrial fibrillation (HCC)     Atherosclerosis of native coronary artery of native heart without angina pectoris,status post PTCA of LAD in March 1995/EF 70%.  Mild aortic stenosis     Essential hypertension     Diastolic dysfunction, left ventricle    She has been doing reasonably well. Her activity level appears to be above average for her age of 80. She shows no signs of decompensated congestive heart failure. Her dyspnea on exertion seems to be secondary to left ventricular diastolic dysfunction because of advanced age and superimposed atrial fibrillation. I would not recommend more aggressive diuretic treatment at this time. Her atrial fibrillation has been stable with good rate control. She has had no symptoms to indicate angina. She indicates that she is rather content with the way it is for now.

## 2018-01-10 NOTE — MR AVS SNAPSHOT
Visit Information Date & Time Provider Department Dept. Phone Encounter #  
 1/10/2018 11:00 AM Alesha Shipley MD [facility] [id number] Follow-up Instructions Return in about 6 months (around 7/10/2018). Your Appointments 1/10/2018  1:30 PM  
Office Visit with Johanna Stephenson MD  
[facility] [address] [facility]) Appt Note: 3 month f/u; rsd from 1/9/18  
 [address] [facility], [address] Erica Mower [address]  
  
   
 [address] [facility], [address]  
  
    
  
 4/2/2018 10:00 AM  
Any with Tiesha Becker MD  
[facility] ([facility]) Appt Note: 6m fu  
 [address] 
[address] [facility] [address]  
[address] [facility] [address] [facility] [address] [facility] [address] Upcoming Health Maintenance Date Due  
 FOOT EXAM Q1 1/30/2018* HEMOGLOBIN A1C Q6M 2/18/2018 EYE EXAM RETINAL OR DILATED Q1 3/21/2018 MICROALBUMIN Q1 8/31/2018 MEDICARE YEARLY EXAM 9/1/2018 LIPID PANEL Q1 1/2/2019 GLAUCOMA SCREENING Q2Y 3/21/2019 DTaP/Tdap/Td series (2 - Td) 8/31/2027 *Topic was postponed. The date shown is not the original due date. Allergies as of 1/10/2018  Review Complete On: 1/10/2018 By: Alesha Shipley MD  
  
 Severity Noted Reaction Type Reactions Bactrim [Sulfamethoprim Ds] High 02/11/2013   Systemic Nausea and Vomiting Vicodin [Hydrocodone-acetaminophen] Medium 10/03/2012   Side Effect Nausea Only Atenolol    Other (comments)  
 interolance Atenolol  05/09/2017    Other (comments) Black out Codeine  05/09/2017    Other (comments)  
 headache Erythromycin    Unknown (comments) Erythromycin  05/09/2017    Hives, Itching Other Medication    Not Reported This Time Refined sugar Pcn [Penicillins]    Hives Penicillin G  05/09/2017    Hives, Itching Current Immunizations  Reviewed on 8/31/2017 Name Date Influenza High Dose Vaccine PF 8/31/2017 10:27 AM, 8/26/2016 10:11 AM, 10/12/2015 11:23 AM, 9/10/2014  3:33 PM  
 Influenza Vaccine 10/24/2013 Influenza Vaccine Split 10/28/2011 Influenza Vaccine Whole 10/25/2010 Pneumococcal Conjugate (PCV-13) 4/21/2016 10:28 AM  
 Pneumococcal Polysaccharide (PPSV-23) 4/28/2017  9:07 AM  
 TB Skin Test (PPD) 1/1/1992 Not reviewed this visit You Were Diagnosed With   
  
 Codes Comments Chronic atrial fibrillation (HCC)    -  Primary ICD-10-CM: C13.1 ICD-9-CM: 427.31 Atherosclerosis of native coronary artery of native heart without angina pectoris     ICD-10-CM: I25.10 ICD-9-CM: 414.01 Mild aortic stenosis     ICD-10-CM: I35.0 ICD-9-CM: 424.1 Essential hypertension     ICD-10-CM: I10 
ICD-9-CM: 401.9 Vitals BP Pulse Height(growth percentile) Weight(growth percentile) SpO2 BMI  
 140/80 (BP 1 Location: Left arm, BP Patient Position: Sitting) 85 5' (1.524 m) 130 lb (59 kg) 98% 25.39 kg/m2 OB Status Smoking Status Hysterectomy Never Smoker Vitals History BMI and BSA Data Body Mass Index Body Surface Area  
 25.39 kg/m 2 1.58 m 2 Preferred Pharmacy Pharmacy Name Phone [facility], [address] [place] [address] [phone] Your Updated Medication List  
  
   
This list is accurate as of: 1/10/18 12:10 PM.  Always use your most recent med list.  
  
  
  
  
 ACIDOPHILUS Cap Generic drug:  Lactobacillus acidophilus Take 2 Caps by mouth two (2) times a day. ALEVE 220 mg Cap Generic drug:  naproxen sodium Take  by mouth. amLODIPine 10 mg tablet Commonly known as:  NORVASC  
take 1 tablet by mouth daily  
  
 ascorbic acid (vitamin C) 250 mg tablet Commonly known as:  VITAMIN C Take 1 Tab by mouth two (2) times daily (with meals).  [Request refills from PCP (Dr. Anaid Chapman)] aspirin 81 mg tablet Take 81 mg by mouth daily. carvedilol 12.5 mg tablet Commonly known as:  COREG  
take 1 tablet by mouth twice a day  
  
 cholecalciferol 1,000 unit tablet Commonly known as:  VITAMIN D3 Take 2 Tabs by mouth daily. [Request refills from PCP (Dr. Anaid Chapman)] docusate sodium 100 mg capsule Commonly known as:  Theola Duet One capsule by mouth twice daily FISH OIL PO Take 3 Tabs by mouth daily. hydroCHLOROthiazide 12.5 mg capsule Commonly known as:  MICROZIDE  
take 1 capsule by mouth once daily  
  
 losartan 50 mg tablet Commonly known as:  COZAAR Take 1 Tab by mouth daily. lovastatin 20 mg tablet Commonly known as:  MEVACOR Take 1 Tab by mouth daily. methylPREDNISolone 4 mg tablet Commonly known as:  MEDROL (LAVONNE) As directed  
  
 multivitamin tablet Commonly known as:  ONE A DAY Take 1 Tab by mouth daily. PriLOSEC 20 mg capsule Generic drug:  omeprazole Take 20 mg by mouth daily as needed. tolterodine 2 mg tablet Commonly known as:  Gloriann Bill Take 1 Tab by mouth two (2) times a day. traMADol 50 mg tablet Commonly known as:  ULTRAM  
Take 0.5 Tabs by mouth every six (6) hours as needed for Pain. Max Daily Amount: 100 mg. We Performed the Following AMB POC EKG ROUTINE W/ 12 LEADS, INTER & REP [18632 CPT(R)] Follow-up Instructions Return in about 6 months (around 7/10/2018). Introducing [facility]! [facility] introduces [employer] patient portal. Now you can access parts of your medical record, email your doctor's office, and request medication refills online. 1. In your internet browser, go to [email or web address] 2. Click on the First Time User? Click Here link in the Sign In box. You will see the New Member Sign Up page. 3. Enter your [employer] Access Code exactly as it appears below.  You will not need to use this code after youve completed the sign-up process. If you do not sign up before the expiration date, you must request a new code. · [employer] Access Code: [email or web address] Expires: 3/4/2018  5:12 PM 
 
4. Enter the last four digits of your Social Security Number (xxxx) and Date of Birth (mm/dd/yyyy) as indicated and click Submit. You will be taken to the next sign-up page. 5. Create a [employer] ID. This will be your [employer] login ID and cannot be changed, so think of one that is secure and easy to remember. 6. Create a [employer] password. You can change your password at any time. 7. Enter your Password Reset Question and Answer. This can be used at a later time if you forget your password. 8. Enter your e-mail address. You will receive e-mail notification when new information is available in [address] 9. Click Sign Up. You can now view and download portions of your medical record. 10. Click the Download Summary menu link to download a portable copy of your medical information. If you have questions, please visit the Frequently Asked Questions section of the [employer] website. Remember, [employer] is NOT to be used for urgent needs. For medical emergencies, dial 911. Now available from your iPhone and Android! Please provide this summary of care documentation to your next provider. Your primary care clinician is listed as Jason Aguilar. Diane Moulton. If you have any questions after today's visit, please call [phone].

## 2018-01-15 RX ORDER — TOLTERODINE TARTRATE 2 MG/1
2 TABLET, EXTENDED RELEASE ORAL 2 TIMES DAILY
Qty: 180 TAB | Refills: 3 | Status: SHIPPED | OUTPATIENT
Start: 2018-01-15 | End: 2019-02-27 | Stop reason: SDUPTHER

## 2018-01-15 NOTE — TELEPHONE ENCOUNTER
Last Visit: 01/10/2018 with MD Mildred Hampton    Next Appointment: 05/31/2018 with MD Mildred Hampton   Previous Refill Encounters: 01/18/2017 per ZARI Campos #[id number] with 3 refills     Requested Prescriptions     Pending Prescriptions Disp Refills    tolterodine (DETROL) 2 mg tablet 180 Tab 3     Sig: Take 1 Tab by mouth two (2) times a day.

## 2018-01-22 ENCOUNTER — OFFICE VISIT (OUTPATIENT)
Dept: ORTHOPEDIC SURGERY | Age: 83
End: 2018-01-22

## 2018-01-22 VITALS
DIASTOLIC BLOOD PRESSURE: 77 MMHG | HEIGHT: 60 IN | BODY MASS INDEX: 25.91 KG/M2 | HEART RATE: 62 BPM | TEMPERATURE: 98.4 F | WEIGHT: 132 LBS | SYSTOLIC BLOOD PRESSURE: 137 MMHG | OXYGEN SATURATION: 94 %

## 2018-01-22 DIAGNOSIS — M54.16 LUMBAR RADICULOPATHY: Primary | ICD-10-CM

## 2018-01-22 DIAGNOSIS — M25.562 LEFT KNEE PAIN, UNSPECIFIED CHRONICITY: ICD-10-CM

## 2018-01-22 NOTE — PROGRESS NOTES
Cristina Milton  12/29/1928   Chief Complaint   Patient presents with    Knee Pain     left knee pain        HISTORY OF PRESENT ILLNESS  Cristina Milton is a 80 y.o. female who presents today for evaluation of left leg pain. Referred by Dr. Otilio Cruz she rates her pain 7/10 today. Has seen Dr. Suly Beck in the past. Pain has been present for a few years. She had a bad fall in May 2014. Localized behind her leg and radiating down. Patient describes the pain as aching, throbbing and radiating that is Intermittent in nature. Symptoms are worse with prolonged walking and standing, Activity, Exercise and is better with  Rest. Associated symptoms include weakness. Since problem started, it: has worsened. Pain does wake patient up at night. Has taken no recent medications for the problem. Has tried following treatments: Injections:NO; Brace:NO; Therapy:NO; Cane/Crutch:NO       Allergies   Allergen Reactions    Bactrim [Sulfamethoprim Ds] Nausea and Vomiting    Vicodin [Hydrocodone-Acetaminophen] Nausea Only    Atenolol Other (comments)     interolance    Atenolol Other (comments)     Black out    Codeine Other (comments)     headache    Erythromycin Unknown (comments)    Erythromycin Hives and Itching    Other Medication Not Reported This Time     Refined sugar    Pcn [Penicillins] Hives    Penicillin G Hives and Itching        Past Medical History:   Diagnosis Date    Allergic rhinitis     Anemia     Asymptomatic carotid bruit     asymptomatic    Atrial fibrillation ([address] 04/2017    Started on Xarelto    Atrial fibrillation ([facility]     Cardiac cath 12/15/2010    pRCA 30%. LM patent. LAD 25%. CX 30%. LVEDP 10 mmHg. EF 60%. Mild AS.  Cardiac nuclear imaging test, low to mod risk 02/22/2016    Low to intermediate risk. Sm reversible inferoapical defect may be a sm area of ischemia. No prior infarction. EF >75%.   Neg EKG on pharm stress test.    Carotid duplex 02/10/2014    Mild 1-49% bilateral ICA stenosis. 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 De La Paz Enter Hypertension     Lung collapse Dec 2011    Collapse of left upper lobe    Mild aortic stenosis 8/22/2011    Osteoarthritis     Osteoporosis     Positive PPD       Social History     Social History    Marital status:      Spouse name: N/A    Number of children: N/A    Years of education: N/A     Occupational History    Not on file.      Social History Main Topics    Smoking status: Never Smoker    Smokeless tobacco: Never Used      Comment: extensive secondhand smoke exposure through job    Alcohol use No    Drug use: No    Sexual activity: No     Other Topics Concern    Not on file     Social History Narrative    ** Merged History Encounter **           Past Surgical History:   Procedure Laterality Date    HX CATARACT REMOVAL      HX COLONOSCOPY      HX HEART CATHETERIZATION  12/15/10    negative    HX HYSTERECTOMY      HX KNEE ARTHROSCOPY      HX PTCA  3/1995    of the LAD; has had no recurrent angina since    HX TONSILLECTOMY      [place] BRONCHOSCOPY BRONCHIAL/ENDOBRNCL BX 1+ SITES  9/25/2012           Family History   Problem Relation Age of Onset    Heart Disease Mother      History of CHF    Heart Disease Father     Heart Attack Father     Heart Disease Sister     Cancer Brother      non-Hodgkin lymphoma    Cancer Brother      liver cancer    Heart Disease Brother     Stroke Brother     Parkinsonism Sister     Parkinsonism Brother       Current Outpatient Prescriptions   Medication Sig    tolterodine (DETROL) 2 mg tablet Take 1 Tab by mouth two (2) times a day.  naproxen sodium (ALEVE) 220 mg cap Take  by mouth.  traMADol (ULTRAM) 50 mg tablet Take 0.5 Tabs by mouth every six (6) hours as needed for Pain. Max Daily Amount: 100 mg.    losartan (COZAAR) 50 mg tablet Take 1 Tab by mouth daily.  carvedilol (COREG) 12.5 mg tablet take 1 tablet by mouth twice a day    amLODIPine (NORVASC) 10 mg tablet take 1 tablet by mouth daily    lovastatin (MEVACOR) 20 mg tablet Take 1 Tab by mouth daily.  hydroCHLOROthiazide (MICROZIDE) 12.5 mg capsule take 1 capsule by mouth once daily    multivitamin (ONE A DAY) tablet Take 1 Tab by mouth daily.  Lactobacillus acidophilus (ACIDOPHILUS) cap Take 2 Caps by mouth two (2) times a day.  docusate sodium (COLACE) 100 mg capsule One capsule by mouth twice daily    omeprazole (PRILOSEC) 20 mg capsule Take 20 mg by mouth daily as needed.  ascorbic acid (VITAMIN C) 250 mg tablet Take 1 Tab by mouth two (2) times daily (with meals). [Request refills from PCP (Dr. Erma Chapman)]    cholecalciferol (VITAMIN D3) 1,000 unit tablet Take 2 Tabs by mouth daily. [Request refills from PCP (Dr. Erma Chapman)]    aspirin 81 mg tablet Take 81 mg by mouth daily.  DOCOSAHEXANOIC ACID/EPA (FISH OIL PO) Take 3 Tabs by mouth daily. No current facility-administered medications for this visit. REVIEW OF SYSTEM   Patient denies: Weight loss, Fever/Chills, HA, Visual changes, Fatigue, Chest pain, SOB, Abdominal pain, N/V/D/C, Blood in stool or urine, Edema. Pertinent positive as above in HPI.  All others were negative    PHYSICAL EXAM:   Visit Vitals    /77 (BP 1 Location: Left arm, BP Patient Position: Sitting)    Pulse 62    Temp 98.4 °F (36.9 °C)    Ht 5' (1.524 m)    Wt 132 lb (59.9 kg)    SpO2 94%    BMI 25.78 kg/m2     The patient is a well-developed, well-nourished female   in no acute distress. The patient is alert and oriented times three. The patient is alert and oriented times three. Mood and affect are normal.  LYMPHATIC: lymph nodes are not enlarged and are within normal limits  SKIN: normal in color and non tender to palpation. There are no bruises or abrasions noted. NEUROLOGICAL: Motor sensory exam is within normal limits. Reflexes are equal bilaterally. There is normal sensation to pinprick and light touch  MUSCULOSKELETAL:  Examination Left knee   Skin Intact   Range of motion 0-130   Effusion -   Medial joint line tenderness -   Lateral joint line tenderness -   Tenderness Pes Bursa -   Tenderness insertion MCL -   Tenderness insertion LCL -   Naldos -   Patella crepitus -   Patella grind -   Lachman -   Pivot shift -   Anterior drawer -   Posterior drawer -   Varus stress -   Valgus stress -   Neurovascular Intact   Calf Swelling and Tenderness to Palpation -   Dax's Test -   Hamstring Cord Tightness -       IMAGING: XR of the left knee dated 1/22/18 was reviewed and read: diffuse degenerative changes and calcification of the meniscus      IMPRESSION:      ICD-10-CM ICD-9-CM    1. Lumbar radiculopathy M54.16 724.4 REFERRAL TO PHYSICAL THERAPY   2. Left knee pain, unspecified chronicity M25.562 719.46 AMB POC XRAY, KNEE; 1/2 VIEWS        PLAN:  1. Patient's left leg pain is likely coming from a lumbar radiculopathy. Risk factors include: dm, htn  2. No cortisone injection indicated today   3. Yes Physical Therapy indicated today  4. No diagnostic test indicated today  5. No durable medical equipment indicated today  6. No referral indicated today   7. No medications indicated today  8.  No Narcotic indicated today    RTC 3 weeks  Follow-up Disposition: Not on File  Office note will be sent to referring provider. Scribed by Daniella Torres ([address]) as dictated by MD ULYSSES Madera, Dr. Cristian Mcleod, confirm that all documentation is accurate.     Cristian Mcleod M.D.   [facility]

## 2018-02-01 ENCOUNTER — HOSPITAL ENCOUNTER (OUTPATIENT)
Dept: PHYSICAL THERAPY | Age: 83
Discharge: HOME | End: 2018-02-01
Payer: MEDICARE

## 2018-02-01 PROCEDURE — G8979 MOBILITY GOAL STATUS: HCPCS

## 2018-02-01 PROCEDURE — 97161 PT EVAL LOW COMPLEX 20 MIN: CPT

## 2018-02-01 PROCEDURE — 97110 THERAPEUTIC EXERCISES: CPT

## 2018-02-01 PROCEDURE — G8978 MOBILITY CURRENT STATUS: HCPCS

## 2018-02-01 NOTE — PROGRESS NOTES
PT DAILY TREATMENT NOTE/LUMBAR EVAL 3-16    Patient Name: Caity Rojas  Date:2018  : 1928  [x]  Patient  Verified  Payor: [place] MEDICARE / Plan: [place] MEDICARE PART A & B / Product Type: Medicare /    In time:145  Out time:223  Total Treatment Time (min): 38  Total Timed Codes (min): 8  1:1 Treatment Time ( only): 8   Visit #: 1 of 10-20    Treatment Area: Low back pain [M54.5]  SUBJECTIVE  Pain Level (0-10 scale): 7  []constant [x]intermittent []improving []worsening []no change since onset  Worse with activity, house cleaning, pulling, Better with inactivity, rest, aspercream  Any medication changes, allergies to medications, adverse drug reactions, diagnosis change, or new procedure performed?: [x] No    [] Yes (see summary sheet for update)  Subjective functional status/changes:     PLOF: I all areas, no AD, retired, household   Limitations to PLOF: Pain  Mechanism of Injury: May 15,2015 had a fall with rib and pelvic fractures, concussion  Current symptoms/Complaints: 81 YO female diagnosed as above and with S/S consistent with above diagnosis presents to skilled outpatient PT, CCO pain in the L hip, buttock and knee, pain across the lower back that radiates into the L leg. Pain today 7. Worse with activity, house cleaning, pulling, Better with inactivity, rest, aspercream.     Previous Treatment/Compliance: hospitalized 3 weeks with initial injury, PT  after hospitalization, injection, medications, self managing and getting by.   PMHx/Surgical Hx: DM, HTN, scoliosis  Work Hx: retired  Living Situation: lives in a 1 story house, 3 steps to garage with railing,  also has a ramp at front entrance, lives with spouse  Pt Goals: relief without medication  Barriers: [x]pain []financial []time []transportation []other  Motivation:good  Substance use: []Alcohol []Tobacco []other:   FABQ Score: []low []elevate  Cognition: A & O x 4    Other:    OBJECTIVE/EXAMINATION  Domestic Life: household chores, community activities  Activity/Recreational Limitations: pain  Mobility: R SC with base, also has a rollator  Self Care: I        Modality rationale:     Min Type Additional Details    [] Estim:  []Unatt       []IFC  []Premod                        []Other:  []w/ice   []w/heat  Position:  Location:    [] Estim: []Att    []TENS instruct  []NMES                    []Other:  []w/US   []w/ice   []w/heat  Position:  Location:    []  Traction: [] Cervical       []Lumbar                       [] Prone          []Supine                       []Intermittent   []Continuous Lbs:  [] before manual  [] after manual    []  Ultrasound: []Continuous   [] Pulsed                           []1MHz   []3MHz Location:  W/cm2:    []  Iontophoresis with dexamethasone         Location: [] Take home patch   [] In clinic    []  Ice     []  heat  []  Ice massage  []  Laser   []  Anodyne Position:  Location:    []  Laser with stim  []  Other: Position:  Location:    []  Vasopneumatic Device Pressure:       [] lo [] med [] hi   Temperature: [] lo [] med [] hi   [] Skin assessment post-treatment:  []intact []redness- no adverse reaction    []redness  adverse reaction:     30 min [x]Eval                  []Re-Eval       8 min Therapeutic Exercise:  [] See flow sheet :   Rationale: increase ROM, increase strength, improve coordination, improve balance and increase proprioception to improve the patients ability to aid with increase tolerance to ADLS and activities     min Therapeutic Activity:  []  See flow sheet :   Rationale:   to improve the patients ability to       min Neuromuscular Re-education:  []  See flow sheet :   Rationale:   to improve the patients ability to      min Manual Therapy:     Rationale:  to      min Gait Training:  ___ feet with ___ device on level surfaces with ___ level of assist   Rationale:           With   [] TE   [] TA   [] neuro   [] other: Patient Education: [x] Review HEP    [] Progressed/Changed HEP based on: [] positioning   [] body mechanics   [] transfers   [] heat/ice application    [] other:      Other Objective/Functional Measures:     Physical Therapy Evaluation - Lumbar Spine ([facility])    SUBJECTIVE  Chief Complaint:    Mechanism of injury:    Symptoms:  Aggravated by:   [] Bending [] Sitting [] Standing [] Walking   [] Moving [] Cough [] Sneeze [] Valsalva   [] AM  [] PM  Lying:  [] sup   [] pro   [] sidelying   [] Other:     Eased by:    [] Bending [] Sitting [] Standing [] Walking   [] Moving [] AM  [] PM  Lying: [] sup  [] pro  [] sidelying   [] Other:     General Health:  Red Flags Indicated? [] Yes    [] No  [] Yes [] No Recent weight change (If yes, due to dieting?  [] Yes  [] No)   [] Yes [] No Weakness in legs during walking  [] Yes [] No Unremitting pain at night  [] Yes [] No Abdominal pain or problems  [] Yes [] No Rectal bleeding  [] Yes [] No Feet more cold or painful in cold weather  [] Yes [] No Menstrual irregularities  [] Yes [] No Blood or pain with urination  [] Yes [] No Dysfunction of bowel or bladder  [] Yes [] No Recent illness within past 3 weeks (i.e, cold, flu)  [] Yes [] No Numbness/tingling in buttock/genitalia region    Past History/Treatments:     Diagnostic Tests: [] Lab work [] X-rays    [] CT [] MRI     [] Other:  Results:    Functional Status  Prior level of function:As above  Present functional limitations:FOTO  What position do you sleep in?: R SL and supine    OBJECTIVE  Posture:mild FH and RS, elevated L iliac crest, R scapular winging,   Lateral Shift: [x] R    [x] L     [] +  [x] -  Kyphosis: [x] Increased [] Decreased   []  WNL  Lordosis:  [] Increased [] Decreased   [] WNL  Pelvic symmetry: [] WNL    [] Other:    Gait:  [] Normal     [x] Abnormal:R SC for eval and rollator at home, antalgic gait with decreased ease observed with sit to stand  Active Movements: [] N/A   [] Too acute   [] Other:  ROM   AROM % PROM Comments:pain, area   Forward flexion 40-60 Extension 20-30      SB right 20-30      SB left 20-30      Rotation right 5-10 50%  pulling   Rotation left 5-10 50%  pulling     Repeated Movements   Effects on present pain: produces (OR), abolishes (A), increases (incr), decreases (decr), centralizes (C), peripheral (PH), no effect (NE)   Pre-Test Sx Flexion Repeated Flexion Extension Repeated Extension Repeated SBL Repeated SBR   Sitting          Standing          Lying      N/A N/A   Comments:  Side Glide:  Sustained passive positioning test:    Neuro Screen [] WNL  Myotome/Dermatome/Reflexes:  Comments:    Dural Mobility:  SLR Sitting: [] R    [] L    [] +    [] -  @ (degrees):           Supine: [] R    [] L    [] +    [] -  @ (degrees):   Slump Test: [] R    [x] L    [x] +    [] -  @ (degrees):   Prone Knee Bend: [] R    [] L    [] +    [] -     Palpation  [] Min  [x] Mod  [] Severe    Location:TTP/STC  L piriformis  [] Min  [] Mod  [] Severe    Location:  [] Min  [] Mod  [] Severe    Location:    Strength   L(0-5) R (0-5) N/T   Hip Flexion (L1,2)   []   Knee Extension (L3,4)   []   Ankle Dorsiflexion (L4)   []   Great Toe Extension (L5)   []   Ankle Plantarflexion (S1)   []   Knee Flexion (S1,2)   []   Upper Abdominals   []   Lower Abdominals   []   Paraspinals   []   Back Rotators   []   Gluteus Bayron   []   Other   []     Special Tests  Lumbar:  Lumb.  Compression: [] Pos  [] Neg               Lumbar Distraction:   [] Pos  [] Neg    Quadrant:  [] Pos  [] Neg   [] Flex  [] Ext    Sacroilliac:  Gaenslen's: [] R    [] L    [] +    [] -     Compression: [] +    [] -     Gapping:  [] +    [] -     Thigh Thrust: [] R    [] L    [] +    [] -     Leg Length: [] +    [] -   Position:    Crests:    ASIS:    PSIS:    Sacral Sulcus:    Mobility: Standing flex:     Sitting flex:     Supine to sit:     Prone knee bend:         Hip: Morris Dub:  [] R    [] L    [] +    [] -     Scour:  [] R    [] L    [] +    [] -     Piriformis: [] R    [x] L    [x] +    [] - Deficits: Emerson's: [] R    [] L    [] +    [] -     Gurjit: [] R    [] L    [] +    [] -     Hamstrings 90/90:    Gastrocsoleus (to neutral): Right: Left:       Global Muscular Weakness:  Abdominals:  Quadratus Lumborum:  Paraspinals: Other:    Other tests/comments:       Pain Level (0-10 scale) post treatment: 7    ASSESSMENT/Changes in Function: Patient demonstrates the potential to make gains with improved ROM, strength, endurance/activity tolerance, functional FOTO survey score and all within a reasonable time frame so as to increase their functional independence with ADLs and activities for carryover to  Improved quality of life, household chores, community activities. Patient requires skilled Physical Therapy so as to monitor their response to and modify their treatment plan accordingly. Patient appears to be an appropriate candidate for skilled outpatient Physical Therapy. Patient will continue to benefit from skilled PT services to modify and progress therapeutic interventions, address functional mobility deficits, address ROM deficits, address strength deficits, analyze and address soft tissue restrictions, analyze and cue movement patterns, analyze and modify body mechanics/ergonomics, assess and modify postural abnormalities and instruct in home and community integration to attain remaining goals.      [x]  See Plan of Care  []  See progress note/recertification  []  See Discharge Summary         Progress towards goals / Updated goals:       PLAN  [x]  Upgrade activities as tolerated     [x]  Continue plan of care  []  Update interventions per flow sheet       []  Discharge due to:_  []  Other:_      Coby Aguilar, PT 2/1/2018  1:48 PM

## 2018-02-01 NOTE — PROGRESS NOTES
[facility] [address], [address] [place] [address], [address] [place], [place] [address]y [address]  [phone] fax      Plan of Care/ Statement of Necessity for Physical Therapy Services    Patient name: Elliot Burger Start of Care: 2018   Referral source: Madhavi Davis,* : 1928    Medical Diagnosis: Low back pain [M54.5]   Onset Date:5/15/15    Treatment Diagnosis: decrease tolerance to ADLs and activities due to back pain, L LE S/S, decrease I with gait,decreased trunk ROM   Prior Hospitalization: see medical history Provider#: [id number]   Medications: Verified on Patient summary List    Comorbidities: DM, HTN, scoliosis   Prior Level of Function:I all areas, no AD, retired, household chores      The Plan of Care and following information is based on the information from the initial evaluation. Assessment/ key information:  79 YO female diagnosed as above and with S/S consistent with above diagnosis presents to skilled outpatient PT, CCO pain in the L hip, buttock and knee, pain across the lower back that radiates into the L leg. Pain today 7. Worse with activity, house cleaning, pulling, Better with inactivity, rest, aspercream.   Previous Treatment/Compliance: hospitalized 3 weeks with initial injury, PT  after hospitalization, injection, medications, self managing and getting by. Pain 7, FOTO 36, Ambulates with R SC and also uses rollator, antalgic gait, + L piriformis tension test and + slump sit test L, - lateral shift, increased kyphosis, B Trunk ROT 50%. Moderate TTP STC L piriformis and TTP across B LS and B SIJ. Patient demonstrates the potential to make gains with improved ROM, strength, endurance/activity tolerance, functional FOTO survey score and all within a reasonable time frame so as to increase their functional independence with ADLs and activities for carryover to  Improved quality of life, household chores, community activities.  Patient requires skilled Physical Therapy so as to monitor their response to and modify their treatment plan accordingly. Patient appears to be an appropriate candidate for skilled outpatient Physical Therapy.     Evaluation Complexity History MEDIUM  Complexity : 1-2 comorbidities / personal factors will impact the outcome/ POC ; Examination MEDIUM Complexity : 3 Standardized tests and measures addressing body structure, function, activity limitation and / or participation in recreation  ;Presentation LOW Complexity : Stable, uncomplicated  ;Clinical Decision Making MEDIUM Complexity : FOTO score of 26-74  Overall Complexity Rating: LOW   Problem List: pain affecting function, decrease ROM, decrease strength, impaired gait/ balance, decrease ADL/ functional abilitiies, decrease activity tolerance, decrease flexibility/ joint mobility, decrease transfer abilities and other FOTO 36   Treatment Plan may include any combination of the following: Therapeutic exercise, Therapeutic activities, Neuromuscular re-education, Physical agent/modality, Gait/balance training, Manual therapy, Patient education, Self Care training, Functional mobility training, Home safety training and Stair training  Patient / Family readiness to learn indicated by: asking questions, trying to perform skills and interest  Persons(s) to be included in education: patient (P)  Barriers to Learning/Limitations: None  Patient Goal (s): relief without medication  Patient Self Reported Health Status: good  Rehabilitation Potential: good    Short Term Goals: To be accomplished in 5 treatments:   1 patient will have established and be I with HEP to aid with progression of skilled PT Program   EVAL issued   CURRENT   2 patient will have pain 5/10 to aid with increase tolerance to ADLS and household chores   EVAL 7   CURRENT   3 patient will have FOTO improved to 41 to show increase tolerance to ADLS and activities   EVAL 36   CURENT    Long Term Goals:  To be accomplished in 10-20 treatments:   1patient will have pain 3/10 to aid with increase tolerance to ADLS and household chores   EVAL 7   CURRENT   2  patient will have FOTO improved to 51 to show increase tolerance to ADLS and activities   EVAL 36   CURENT   3 patient will report overall 50% improvement to aid with increase tolerance to household chores   EVAL pain with household chores   CURRENT   4 patient will ambulate 300 feet with no increase pain for carryover to community ambulation and household activities   EVAL R SC with base , 40-50 feet   CURRENT      Frequency / Duration: Patient to be seen 2-3 times per week for 10-20 treatments. Patient/ Caregiver education and instruction: Diagnosis, prognosis, self care, activity modification and exercises   [x]  Plan of care has been reviewed with PTA    G-Codes (GP)  Mobility   Current  CL= 60-79%   Goal  CK= 40-59%     The severity rating is based on clinical judgment and the FOTO score. Certification Period: 2/1/18- 4/2/18  Darleen Sapp, PT 2/1/2018 2:35 PM    ________________________________________________________________________    I certify that the above Therapy Services are being furnished while the patient is under my care. I agree with the treatment plan and certify that this therapy is necessary.     [de-identified] Signature:____________________  Date:____________Time: _________    Please sign and return to [facility] [address] [place] [address], [address] [place] [address], [address] [place] [address] [address]  [phone] fax

## 2018-02-07 ENCOUNTER — HOSPITAL ENCOUNTER (OUTPATIENT)
Dept: PHYSICAL THERAPY | Age: 83
Discharge: HOME | End: 2018-02-07
Payer: MEDICARE

## 2018-02-07 PROCEDURE — 97110 THERAPEUTIC EXERCISES: CPT | Performed by: PHYSICAL THERAPIST

## 2018-02-07 NOTE — PROGRESS NOTES
PT DAILY TREATMENT NOTE - [facility]     Patient Name: Alexy Gtz  Date:2018  : 1928  [x]  Patient  Verified  Payor: [place] MEDICARE / Plan: [place] MEDICARE PART A & B / Product Type: Medicare /    In time:10:56  Out time:11:38  Total Treatment Time (min): 38  Total Timed Codes (min): 28  1:1 Treatment Time ( only): 28   Visit #: 2 of 20    Treatment Area: Low back pain [M54.5]    SUBJECTIVE  Pain Level (0-10 scale): 6  Any medication changes, allergies to medications, adverse drug reactions, diagnosis change, or new procedure performed?: [x] No    [] Yes (see summary sheet for update)  Subjective functional status/changes:   [] No changes reported  I am stiff and I have pain along the buttocks area.      OBJECTIVE    Modality rationale: decrease edema, decrease inflammation, decrease pain and increase tissue extensibility to improve the patients ability to improve mobility    Min Type Additional Details    [] Estim:  []Unatt       []IFC  []Premod                        []Other:  []w/ice   []w/heat  Position:  Location:    [] Estim: []Att    []TENS instruct  []NMES                    []Other:  []w/US   []w/ice   []w/heat  Position:  Location:    []  Traction: [] Cervical       []Lumbar                       [] Prone          []Supine                       []Intermittent   []Continuous Lbs:  [] before manual  [] after manual    []  Ultrasound: []Continuous   [] Pulsed                           []1MHz   []3MHz W/cm2:  Location:    []  Iontophoresis with dexamethasone         Location: [] Take home patch   [] In clinic   10 []  Ice     [x]  heat  []  Ice massage  []  Laser   []  Anodyne Position:supine  Location: low back     []  Laser with stim  []  Other:  Position:  Location:    []  Vasopneumatic Device Pressure:       [] lo [] med [] hi   Temperature: [] lo [] med [] hi   [x] Skin assessment post-treatment:  [x]intact [x]redness- no adverse reaction    []redness  adverse reaction:     28 min Therapeutic Exercise:  [x] See flow sheet :   Rationale: increase strength, improve coordination, improve balance and increase proprioception to improve the patients ability to ambulate          With   [x] TE   [] TA   [] neuro   [] other: Patient Education: [x] Review HEP    [x] Progressed/Changed HEP based on:   [] positioning   [] body mechanics   [] transfers   [] heat/ice application    [] other: discussed tolerance for HEP. Other Objective/Functional Measures: pt amb with SPC this date with unsteady gait pattern and decreased stride lengths and wt bearing tolerance on left leg. More pain and discomfort with Left HS stretch  Pain Level (0-10 scale) post treatment: 6, less stiffness     ASSESSMENT/Changes in Function: pt tolerated session fair and reported soreness in left hip and buttocks as well as right LE stiffness, making mobility challenging this date. (+) left piriformis stretch. Pt stated she felt better after session. Patient will continue to benefit from skilled PT services to modify and progress therapeutic interventions, address functional mobility deficits, address ROM deficits, address strength deficits, analyze and address soft tissue restrictions, analyze and cue movement patterns, analyze and modify body mechanics/ergonomics, assess and modify postural abnormalities, address imbalance/dizziness and instruct in home and community integration to attain remaining goals. []  See Plan of Care  []  See progress note/recertification  []  See Discharge Summary         Progress towards goals / Updated goals:  Short Term Goals:  To be accomplished in 5 treatments:                        1 patient will have established and be I with HEP to aid with progression of skilled PT Program                        EVAL issued                        CURRENT: pt reported partial compliance                           2 patient will have pain 5/10 to aid with increase tolerance to ADLS and household chores EVAL 7                        CURRENT 6/10                          3 patient will have FOTO improved to 41 to show increase tolerance to ADLS and activities                        EVAL 36                        CURENT: n/a       Long Term Goals:  To be accomplished in 10-20 treatments:                        1patient will have pain 3/10 to aid with increase tolerance to ADLS and household chores                        EVAL 7                        CURRENT  6/10                          2  patient will have FOTO improved to 51 to show increase tolerance to ADLS and activities                        EVAL 36                        CURENT: n/a                          3 patient will report overall 50% improvement to aid with increase tolerance to household chores                        EVAL pain with household chores                        CURRENT: n/a                          4 patient will ambulate 300 feet with no increase pain for carryover to community ambulation and household activities                        EVAL R SC with base , 40-50 feet                        CURRENT: n/a      PLAN  [x]  Upgrade activities as tolerated     [x]  Continue plan of care  []  Update interventions per flow sheet       []  Discharge due to:_  []  Other:_      Kriss Trejo PT 2/7/2018  10:54 AM    Future Appointments  Date Time Provider [place]   2/7/2018 11:00 AM Dom Hendricks [address]   2/9/2018 11:00 AM Kriss Trejo PT [facility] [address]   2/12/2018 11:00 AM Sera Roth PTA [facility] [address]   2/19/2018 11:00 AM Sera Roth PTA [facility] [address]   2/21/2018 11:00 AM Kriss Trejo PT [facility] [address]   4/2/2018 10:00 AM Jasen Mccurdy MD [address] [place] [address] Drive   4/23/2018 1:00 PM Francesco Montenegro MD [facility] 75   5/31/2018 10:30 AM Mihaela Dc MD [facility] [address]   7/16/2018 10:20 AM Kae Sneed MD [address] [place] [address]

## 2018-02-09 ENCOUNTER — HOSPITAL ENCOUNTER (OUTPATIENT)
Dept: PHYSICAL THERAPY | Age: 83
Discharge: HOME | End: 2018-02-09
Payer: MEDICARE

## 2018-02-09 PROCEDURE — 97110 THERAPEUTIC EXERCISES: CPT | Performed by: PHYSICAL THERAPIST

## 2018-02-09 NOTE — PROGRESS NOTES
PT DAILY TREATMENT NOTE - [facility]     Patient Name: Lion Mean  Date:2018  : 1928  [x]  Patient  Verified  Payor: [place] MEDICARE / Plan: VA MEDICARE PART A & B / Product Type: Medicare /    In time:11:03  Out time:11:58  Total Treatment Time (min): 48  Total Timed Codes (min): 38  1:1 Treatment Time ( only): 38  Visit #: 3 of 20    Treatment Area: Low back pain [M54.5]    SUBJECTIVE  Pain Level (0-10 scale): 0  Any medication changes, allergies to medications, adverse drug reactions, diagnosis change, or new procedure performed?: [x] No    [] Yes (see summary sheet for update)  Subjective functional status/changes:   [] No changes reported  I have no back pain right now, just some leg soreness. OBJECTIVE    Modality rationale: decrease edema, decrease inflammation, decrease pain and increase tissue extensibility to improve the patients ability to perform ADLs.     Min Type Additional Details    [] Estim:  []Unatt       []IFC  []Premod                        []Other:  []w/ice   []w/heat  Position:  Location:    [] Estim: []Att    []TENS instruct  []NMES                    []Other:  []w/US   []w/ice   []w/heat  Position:  Location:    []  Traction: [] Cervical       []Lumbar                       [] Prone          []Supine                       []Intermittent   []Continuous Lbs:  [] before manual  [] after manual    []  Ultrasound: []Continuous   [] Pulsed                           []1MHz   []3MHz W/cm2:  Location:    []  Iontophoresis with dexamethasone         Location: [] Take home patch   [] In clinic   10 []  Ice     [x]  heat  []  Ice massage  []  Laser   []  Anodyne Position: inclined   Location: low back    []  Laser with stim  []  Other:  Position:  Location:    []  Vasopneumatic Device Pressure:       [] lo [] med [] hi   Temperature: [] lo [] med [] hi   [x] Skin assessment post-treatment:  [x]intact [x]redness- no adverse reaction    []redness  adverse reaction:     38 min Therapeutic Exercise:  [x] See flow sheet :   Rationale: increase ROM, increase strength, improve coordination and improve balance to improve the patients ability to improve mobility           With   [x] TE   [] TA   [] neuro   [] other: Patient Education: [x] Review HEP    [] Progressed/Changed HEP based on:   [] positioning   [] body mechanics   [] transfers   [] heat/ice application    [] other:      Other Objective/Functional Measures: moderate pain today with supine  crys Piriformis stretch. Pain Level (0-10 scale) post treatment: 4 achy in low back     ASSESSMENT/Changes in Function: pt tolerated there-ex well today with exception to table piriformis stretch. Patient will continue to benefit from skilled PT services to modify and progress therapeutic interventions, address functional mobility deficits, address ROM deficits, address strength deficits, analyze and address soft tissue restrictions, analyze and cue movement patterns, analyze and modify body mechanics/ergonomics, assess and modify postural abnormalities and address imbalance/dizziness to attain remaining goals.      []  See Plan of Care  []  See progress note/recertification  []  See Discharge Summary         Progress towards goals / Updated goals:  Short Term Goals: To be accomplished in 5 treatments:                        8 patient will have established and be I with HEP to aid with progression of skilled PT Program                        EVAL issued                        [id number]: pt reported partial compliance                            1 patient will have pain 5/10 to aid with increase tolerance to ADLS and household chores                        EVAL 7                        CURRENT 0/10 today                           3 patient will have FOTO improved to 41 to show increase tolerance to ADLS and activities                        EVAL 36                        CURENT: TBA 5th visit         Long Term Goals: To be accomplished in 10-20 treatments:                        [id number] will have pain 3/10 to aid with increase tolerance to ADLS and household chores                        EVAL 7                        CURRENT  0/10                          8  patient will have FOTO improved to 51 to show increase tolerance to ADLS and activities                        EVAL 36                        CURENT: n/a                           5 patient will report overall 50% improvement to aid with increase tolerance to household chores                        EVAL pain with household chores                        CURRENT: n/a                           4 patient will ambulate 300 feet with no increase pain for carryover to community ambulation and household activities                        EVAL R SC with base , 40-50 feet                        CURRENT: n/a     PLAN  [x]  Upgrade activities as tolerated     [x]  Continue plan of care  []  Update interventions per flow sheet       []  Discharge due to:_  []  Other:_      Timmy Alejo, PT 2/9/2018  11:19 AM    Future Appointments  Date Time Provider Nori [place]   2/12/2018 11:00 AM Sera Chaudhry PTA [facility]   2/19/2018 11:00 AM Sera Roth PTA [facility]   2/21/2018 11:00 AM Timmy Alejo PT [facility]   4/2/2018 10:00 [facility]   4/23/2018 1:00 PM Branden Ayala MD [place] [address]   5/31/2018 10:30 AM Markel Meyer MD [facility]   7/16/2018 10:20 AM Lexx Chávez MD [address] [place] [address]

## 2018-02-12 ENCOUNTER — APPOINTMENT (OUTPATIENT)
Dept: PHYSICAL THERAPY | Age: 83
End: 2018-02-12
Payer: MEDICARE

## 2018-02-14 ENCOUNTER — APPOINTMENT (OUTPATIENT)
Dept: PHYSICAL THERAPY | Age: 83
End: 2018-02-14
Payer: MEDICARE

## 2018-02-19 ENCOUNTER — HOSPITAL ENCOUNTER (OUTPATIENT)
Dept: PHYSICAL THERAPY | Age: 83
Discharge: HOME | End: 2018-02-19
Payer: MEDICARE

## 2018-02-19 PROCEDURE — 97110 THERAPEUTIC EXERCISES: CPT

## 2018-02-19 NOTE — PROGRESS NOTES
PT DAILY TREATMENT NOTE - [facility]     Patient Name: Nessa Levi  Date:2018  : 1928  [x]  Patient  Verified  Payor: Don Gottron / Plan: VA MEDICARE PART A & B / Product Type: Medicare /    In time:10:55  Out time: 12:07  Total Treatment Time (min): 44  Total Timed Codes (min): 44  1:1 Treatment Time ( [address] only): 16  Visit #: 4 of 10-20    Treatment Area: Low back pain [M54.5]    SUBJECTIVE  Pain Level (0-10 scale): 0  Any medication changes, allergies to medications, adverse drug reactions, diagnosis change, or new procedure performed?: [x] No    [] Yes (see summary sheet for update)  Subjective functional status/changes:   [] No changes reported  No pain.     OBJECTIVE    Modality rationale: decrease edema, decrease inflammation, decrease pain and increase tissue extensibility to improve the patients ability to perform ADL    Min Type Additional Details    [] Estim:  []Unatt       []IFC  []Premod                        []Other:  []w/ice   []w/heat  Position:  Location:    [] Estim: []Att    []TENS instruct  []NMES                    []Other:  []w/US   []w/ice   []w/heat  Position:  Location:    []  Traction: [] Cervical       []Lumbar                       [] Prone          []Supine                       []Intermittent   []Continuous Lbs:  [] before manual  [] after manual   8 [x]  Ultrasound: [x]Continuous   [] Pulsed                           [x]1MHz   []3MHz W/cm2:1.3  Location:(L)  glute    []  Iontophoresis with dexamethasone         Location: [] Take home patch   [] In clinic    []  Ice     []  heat  []  Ice massage  []  Laser   []  Anodyne Position:  Location:    []  Laser with stim  []  Other:  Position:  Location:    []  Vasopneumatic Device Pressure:       [] lo [] med [] hi   Temperature: [] lo [] med [] hi   [x] Skin assessment post-treatment:  [x]intact []redness- no adverse reaction    []redness  adverse reaction:      min []Eval                  []Re-Eval       36  1:1  8 min Therapeutic Exercise:  [x] See flow sheet :   Rationale: increase ROM and increase strength to improve the patients ability to perform ADL      min Therapeutic Activity:  []  See flow sheet :   Rationale:   to improve the patients ability to      min Neuromuscular Re-education:  []  See flow sheet :   Rationale:   to improve the patients ability to      min Manual Therapy:     Rationale: decrease pain, increase ROM, increase tissue extensibility and decrease edema  to perform ADL      min Gait Training:  ___ feet with ___ device on level surfaces with ___ level of assist   Rationale: With   [x] TE   [] TA   [] neuro   [] other: Patient Education: [x] Review HEP    [] Progressed/Changed HEP based on:   [] positioning   [] body mechanics   [] transfers   [] heat/ice application    [] other:      Other Objective/Functional Measures:  TTP  At (L) glute    Pain Level (0-10 scale) post treatment:0     ASSESSMENT/Changes in Function: Fair  Response  To each there ex. Patient will continue to benefit from skilled PT services to address functional mobility deficits, address ROM deficits, address strength deficits, analyze and address soft tissue restrictions, analyze and cue movement patterns and instruct in home and community integration to attain remaining goals.      [x]  See Plan of Care  []  See progress note/recertification  []  See Discharge Summary         Progress towards goals / Updated goals:  Short Term Goals: To be accomplished in 5 treatments:                        4 patient will have established and be I with HEP to aid with progression of skilled PT Program                        EVAL issued                        [id number]: pt reported partial compliance                             9 patient will have pain 5/10 to aid with increase tolerance to ADLS and household chores                        EVAL 7                        CURRENT 0/10 today                           4 patient will have FOTO improved to 41 to show increase tolerance to ADLS and activities                        EVAL 36                        CURENT: TBA 5th visit          Long Term Goals: To be accomplished in 10-20 treatments:                        [id number] will have pain 3/10 to aid with increase tolerance to ADLS and household chores  [phone]  0/10                          4  patient will have FOTO improved to 51 to show increase tolerance to ADLS and activities                        EVAL 36                        CURENT: n/a                            8 patient will report overall 50% improvement to aid with increase tolerance to household chores                        EVAL pain with household chores                        CURRENT: [phone] patient will ambulate 300 feet with no increase pain for carryover to community ambulation and household activities                        EVAL R SC with base , 40-50 feet                        CURRENT: n/a       PLAN  []  Upgrade activities as tolerated     []  Continue plan of care  []  Update interventions per flow sheet       []  Discharge due to:_  []  Other:_      Sera Gonzalez, PTA 2/19/2018  11:50 AM    Future Appointments  Date Time Provider Nori Daviesisti   2/21/2018 11:00 AM Barbara Johnston PT [facility]   4/2/2018 10:00 AM MD Ki Rowell   4/23/2018 1:00 PM MD Destiney Valdivia [address]   5/31/2018 10:30 AM Judi Biswas MD [facility]   7/16/2018 10:20 AM Ramon Ovalle MD [address] [place] [address]

## 2018-02-21 ENCOUNTER — HOSPITAL ENCOUNTER (OUTPATIENT)
Dept: PHYSICAL THERAPY | Age: 83
Discharge: HOME | End: 2018-02-21
Payer: MEDICARE

## 2018-02-21 PROCEDURE — 97112 NEUROMUSCULAR REEDUCATION: CPT | Performed by: PHYSICAL THERAPIST

## 2018-02-21 PROCEDURE — 97110 THERAPEUTIC EXERCISES: CPT | Performed by: PHYSICAL THERAPIST

## 2018-02-21 NOTE — PROGRESS NOTES
PT DAILY TREATMENT NOTE - [facility]     Patient Name: Sally Osorio  Date:2018  : 1928  [x]  Patient  Verified  Payor: Oracio Maria / Plan: [place] MEDICARE PART A & B / Product Type: Medicare /    In time:10:57  Out time:11:50  Total Treatment Time (min): 40  Total Timed Codes (min): 30  1:1 Treatment Time ( only): 23   Visit #: 5 of 20    Treatment Area: Low back pain [M54.5]    SUBJECTIVE  Pain Level (0-10 scale): 0  Any medication changes, allergies to medications, adverse drug reactions, diagnosis change, or new procedure performed?: [x] No    [] Yes (see summary sheet for update)  Subjective functional status/changes:   [] No changes reported  I am feeling no pain tight now, but I do still get some pain and stiffness in the mornings. The stretches and exercises help. I did some last night.      OBJECTIVE    Modality rationale: decrease edema, decrease inflammation, decrease pain and increase tissue extensibility to improve the patients ability to improve mobility    Min Type Additional Details    [] Estim:  []Unatt       []IFC  []Premod                        []Other:  []w/ice   []w/heat  Position:  Location:    [] Estim: []Att    []TENS instruct  []NMES                    []Other:  []w/US   []w/ice   []w/heat  Position:  Location:    []  Traction: [] Cervical       []Lumbar                       [] Prone          []Supine                       []Intermittent   []Continuous Lbs:  [] before manual  [] after manual    []  Ultrasound: []Continuous   [] Pulsed                           []1MHz   []3MHz W/cm2:  Location:    []  Iontophoresis with dexamethasone         Location: [] Take home patch   [] In clinic   10 []  Ice     [x]  heat  []  Ice massage  []  Laser   []  Anodyne Position: incline on plinth   Location: mid-low back     []  Laser with stim  []  Other:  Position:  Location:    []  Vasopneumatic Device Pressure:       [] lo [] med [] hi   Temperature: [] lo [] med [] hi   [x] Skin assessment post-treatment:  [x]intact [x]redness- no adverse reaction    []redness  adverse reaction:     13 1:1, 7tech min Therapeutic Exercise:  [x] See flow sheet :   Rationale: increase ROM, increase strength, improve coordination, improve balance and increase proprioception to improve the patients ability to improve mobility     10 min Neuromuscular Re-education:  [x]  See flow sheet : TA bracing and breathing reviewed with LTR and bridges as well as PPT    Rationale: increase ROM, increase strength, improve coordination, improve balance and increase proprioception  to improve the patients ability to improve core stability for ADLs and mobility            With   [x] TE   [] TA   [] neuro   [] other: Patient Education: [x] Review HEP    [x] Progressed/Changed HEP based on:   [] positioning   [] body mechanics   [] transfers   [] heat/ice application    [] other:      Other Objective/Functional Measures: FOTO: 54/100     Pain Level (0-10 scale) post treatment: 0-1 I feel okay     ASSESSMENT/Changes in Function: pt tolerated progression of bridges and reps today well. Mild increase in soreness with increased reps, overall pt reported feeling good after today's session. Pt did report some mild vertigo with supine to sit transfer today. Patient will continue to benefit from skilled PT services to modify and progress therapeutic interventions, address functional mobility deficits, address ROM deficits, address strength deficits, analyze and address soft tissue restrictions, analyze and cue movement patterns, analyze and modify body mechanics/ergonomics, assess and modify postural abnormalities and address imbalance/dizziness to attain remaining goals.      []  See Plan of Care  []  See progress note/recertification  []  See Discharge Summary         Progress towards goals / Updated goals:  Short Term Goals: To be accomplished in 5 treatments:                        9 patient will have established and be I with HEP to aid with progression of skilled PT Program                        EVAL issued                        [id number]: MET pt reported partial compliance                             0 patient will have pain 5/10 to aid with increase tolerance to ADLS and household chores                        EVAL 7                        CURRENT: MET, 0-1/10 today                           5 patient will have FOTO improved to 41 to show increase tolerance to ADLS and activities                        EVAL 36                        CURENT: MET, [address] be accomplished in 10-20 treatments:                        [id number] will have pain 3/10 to aid with increase tolerance to ADLS and household chores                        EVAL 7                        CURRENT: Met  0-1/10                          8  patient will have FOTO improved to 51 to show increase tolerance to ADLS and activities                        EVAL 36                        CURENT: MET, [address]                        6 patient will report overall 50% improvement to aid with increase tolerance to household chores                        EVAL pain with household chores                        CURRENT: MET 50%                             1 patient will ambulate 300 feet with no increase pain for carryover to community ambulation and household activities                        EVAL R SC with base , 40-50 feet                        CURRENT: n/a    PLAN  [x]  Upgrade activities as tolerated     [x]  Continue plan of care  []  Update interventions per flow sheet       []  Discharge due to:_  []  Other:_      Bryant Cazares, PT 2/21/2018  11:20 AM    Future Appointments  Date Time Provider Nori Grande   2/26/2018 2:30 PM Sera Chaudhry PTA [facility]   2/28/2018 2:30 PM [facility] Roth [facility]   3/6/2018 11:00 AM Sera Roth PTA [facility]   3/9/2018 11:30 AM Sera Roth PTA [facility]   3/13/2018 11:30 AM Paulette Mckinnon, PT [facility]   3/15/2018 11:00 AM Jessica Oswald, PT [facility]   3/20/2018 10:00 AM Jessica Oswald, PT [facility]   3/22/2018 11:00 AM Jessica Oswald, PT [facility]   3/26/2018 11:00 AM Crissy Ball, PT [facility]   3/28/2018 11:00 AM Crissy Ball, PT [facility]   4[facility]2/2018 10:00 [facility]   4/23/2018 1:00 PM Elan Hinton MD [facility]   5/31/2018 10:30 AM Andrew Jennings MD [facility] [address]   7/16/2018 10:20 AM Omid Sanon MD [address] [place] [address]

## 2018-02-26 ENCOUNTER — HOSPITAL ENCOUNTER (OUTPATIENT)
Dept: PHYSICAL THERAPY | Age: 83
Discharge: HOME | End: 2018-02-26
Payer: MEDICARE

## 2018-02-26 PROCEDURE — 97110 THERAPEUTIC EXERCISES: CPT

## 2018-02-26 PROCEDURE — 97140 MANUAL THERAPY 1/> REGIONS: CPT

## 2018-02-26 NOTE — PROGRESS NOTES
PT DAILY TREATMENT NOTE - [facility]     Patient Name: Leela Richardson  Date:2018  : 1928  [x]  Patient  Verified  Payor: [place] Pay[place] / Plan: [place] MEDICARE PART A & B / Product Type: Medicare /    In time:2:35  Out time: 3:18  Total Treatment Time (min): 48  Total Timed Codes (min): 38  1:1 Treatment Time ( [address] only): 38  Visit #: 6 of 20    Treatment Area: Low back pain [M54.5]    SUBJECTIVE  Pain Level (0-10 scale): 0  Any medication changes, allergies to medications, adverse drug reactions, diagnosis change, or new procedure performed?: [x] No    [] Yes (see summary sheet for update)  Subjective functional status/changes:   [] No changes reported  Had  Some pain over the weekend.     OBJECTIVE    Modality rationale: decrease edema, decrease inflammation, decrease pain and increase tissue extensibility to improve the patients ability to perform ADL    Min Type Additional Details    [] Estim:  []Unatt       []IFC  []Premod                        []Other:  []w/ice   []w/heat  Position:  Location:     [] Estim: []Att    []TENS instruct  []NMES                    []Other:  []w/US   []w/ice   []w/heat  Position:  Location:    []  Traction: [] Cervical       []Lumbar                       [] Prone          []Supine                       []Intermittent   []Continuous Lbs:  [] before manual  [] after manual   8 [x]  Ultrasound: [x]Continuous   [] Pulsed                           [x]1MHz   []3MHz W/cm2: 1.3  Location:(R) knee    []  Iontophoresis with dexamethasone         Location: [] Take home patch   [] In clinic   10 []  Ice     [x]  Heat  post  []  Ice massage  []  Laser   []  Anodyne Position:supine  Location:(B) LSP    []  Laser with stim  []  Other:  Position:  Location:    []  Vasopneumatic Device Pressure:       [] lo [] med [] hi   Temperature: [] lo [] med [] hi   [x] Skin assessment post-treatment:  [x]intact []redness- no adverse reaction    []redness  adverse reaction:      min []Eval []Re-Eval       30 min Therapeutic Exercise:  [x] See flow sheet :   Rationale: increase ROM and increase strength to improve the patients ability to perform ADL      min Therapeutic Activity:  []  See flow sheet :   Rationale:   to improve the patients ability to       min Neuromuscular Re-education:  []  See flow sheet :   Rationale:   to improve the patients ability to      min Manual Therapy:     Rationale: decrease pain, increase ROM, increase tissue extensibility and decrease edema  to perform ADL     min Gait Training:  ___ feet with ___ device on level surfaces with ___ level of assist   Rationale: With   [x] TE   [] TA   [] neuro   [] other: Patient Education: [x] Review HEP    [] Progressed/Changed HEP based on:   [] positioning   [] body mechanics   [] transfers   [] heat/ice application    [] other:      Other Objective/Functional Measures:  Pt  Experienced  I episode  Of  Dizziness  With sit  To supine. Once pt sat  For  1 min was resolved    Pain Level (0-10 scale) post treatment: 0    ASSESSMENT/Changes in Function: Responded  Fairly  Well  To each there ex. Patient will continue to benefit from skilled PT services to address functional mobility deficits, address ROM deficits, address strength deficits, analyze and address soft tissue restrictions, analyze and cue movement patterns and instruct in home and community integration to attain remaining goals.      [x]  See Plan of Care  []  See progress note/recertification  []  See Discharge Summary         Progress towards goals / Updated goals:  Short Term Goals: To be accomplished in 5 treatments:                        5 patient will have established and be I with HEP to aid with progression of skilled PT Program                        EVAL issued                        MZDCBJB: MET pt reported partial compliance                             3 patient will have pain 5/10 to aid with increase tolerance to ADLS and household chores                        EVAL 7                        CURRENT: MET, 0-1/10 today                           5 patient will have FOTO improved to 41 to show increase tolerance to ADLS and activities                        EVAL 36                        CURENT: [address] be accomplished in 10-20 treatments:                        [id number] will have pain 3/10 to aid with increase tolerance to ADLS and household chores                        EVAL 7                        CURRENT: Met  0-1/10                          9  patient will have FOTO improved to 51 to show increase tolerance to ADLS and activities                        EVAL 36                        CURENT: METRobert 5                        6 patient will report overall 50% improvement to aid with increase tolerance to household chores                        EVAL pain with household chores  [phone]: MET 50%                             6 patient will ambulate 300 feet with no increase pain for carryover to community ambulation and household activities                        EVAL R SC with base , 40-50 feet                        CURRENT: n/a    PLAN  []  Upgrade activities as tolerated     [x]  Continue plan of care  []  Update interventions per flow sheet       []  Discharge due to:_  [x]  Other:_  Ambulation  NV    Sera Roth PTA 2/26/2018  2:40 PM    Future Appointments  Date Time Provider [place]   2/28/2018 2:30 PM Sera Chaudhry PTA [facility]   3/6/2018 11:00 AM Sera Roth PTA [facility]   3/9/2018 11:30 AM Sera Roth PTA [facility]   3/13/2018 11:30 AM Reyes Daniels, PT [facility]   3/15/2018 11:00 AM Reyes Daniels, PT [facility]   3/20/2018 10:00 AM Reyes Daniels, PT [facility]   3/22/2018 11:00 AM Reyes Daniels, PT [facility]   3/26/2018 11:00 AM Argelia Cardozo, PT [facility]   3/28/2018 11:00 AM Argelia Cardozo, PT [facility]   4/2/2018 10:00 AM Sierra Wall Alessia Pablo, [address]   4/23/2018 1:00 PM Quentin Heck MD [facility]   5/31/2018 10:30 AM Mena Looney MD [facility] [address]   7/16/2018 10:20 AM Delphine Epps MD [address] [place] [address]

## 2018-02-28 ENCOUNTER — HOSPITAL ENCOUNTER (OUTPATIENT)
Dept: PHYSICAL THERAPY | Age: 83
Discharge: HOME | End: 2018-02-28
Payer: MEDICARE

## 2018-02-28 PROCEDURE — 97110 THERAPEUTIC EXERCISES: CPT

## 2018-02-28 NOTE — PROGRESS NOTES
PT DAILY TREATMENT NOTE - [facility]     Patient Name: Carol Dose  Date:2018  : 1928  [x]  Patient  Verified  Payor: [place] MEDICARE / Plan: [place] MEDICARE PART A & B / Product Type: Medicare /    In time:2:21  Out time:3:14  Total Treatment Time (min): 48  Total Timed Codes (min): 48  1:1 Treatment Time ( [address] only): 50  Visit #: 7 of 20    Treatment Area: Low back pain [M54.5]    SUBJECTIVE  Pain Level (0-10 scale): 0  Any medication changes, allergies to medications, adverse drug reactions, diagnosis change, or new procedure performed?: [x] No    [] Yes (see summary sheet for update)  Subjective functional status/changes:   [] No changes reported  Im  A little hsort winded    OBJECTIVE    Modality rationale: decrease edema, decrease inflammation, decrease pain and increase tissue extensibility to improve the patients ability to perform ADL    Min Type Additional Details    [] Estim:  []Unatt       []IFC  []Premod                        []Other:  []w/ice   []w/heat  Position:  Location:    [] Estim: []Att    []TENS instruct  []NMES                    []Other:  []w/US   []w/ice   []w/heat  Position:  Location:    []  Traction: [] Cervical       []Lumbar                       [] Prone          []Supine                       []Intermittent   []Continuous Lbs:  [] before manual  [] after manual    []  Ultrasound: []Continuous   [] Pulsed                           []1MHz   []3MHz W/cm2:  Location:    []  Iontophoresis with dexamethasone         Location: [] Take home patch   [] In clinic   10 []  Ice     [x]  heat   post  []  Ice massage  []  Laser   []  Anodyne Position:supine  Location:(B) LSP    []  Laser with stim  []  Other:  Position:  Location:    []  Vasopneumatic Device Pressure:       [] lo [] med [] hi   Temperature: [] lo [] med [] hi   [x] Skin assessment post-treatment:  [x]intact []redness- no adverse reaction    []redness  adverse reaction:      min []Eval                  []Re-Eval       38 min Therapeutic Exercise:  [x] See flow sheet :   Rationale: increase ROM and increase strength to improve the patients ability to perform ADL      min Therapeutic Activity:  []  See flow sheet :   Rationale:   to improve the patients ability to       min Neuromuscular Re-education:  []  See flow sheet :   Rationale:   to improve the patients ability to      min Manual Therapy:     Rationale: decrease pain, increase ROM, increase tissue extensibility and decrease edema  to perform ADL      min Gait Training:  ___ feet with ___ device on level surfaces with ___ level of assist   Rationale: With   [x] TE   [] TA   [] neuro   [] other: Patient Education: [x] Review HEP    [] Progressed/Changed HEP based on:   [] positioning   [] body mechanics   [] transfers   [] heat/ice application    [] other:      Other Objective/Functional Measures:  Held   remainingStanding  There ex  Due to pt reported  That  She  Was  Short  Winded. Once  Pt  Sat  And  Performed  Supine  There  Ex. SOB  With resolved. Pain Level (0-10 scale) post treatment: 0    ASSESSMENT/Changes in Function: Fair  Response  To each there ex. Patient will continue to benefit from skilled PT services to address functional mobility deficits, address ROM deficits, address strength deficits, analyze and address soft tissue restrictions and analyze and cue movement patterns to attain remaining goals.      [x]  See Plan of Care  []  See progress note/recertification  []  See Discharge Summary         Short Term Goals: To be accomplished in 5 treatments:                        6 patient will have established and be I with HEP to aid with progression of skilled PT Program                        EVAL issued                        YZMUAJN: MET pt reported partial compliance                             5 patient will have pain 5/10 to aid with increase tolerance to ADLS and household chores                        EVAL 7                        CURRENT: MET, 0-1/10 today                           7 patient will have FOTO improved to 41 to show increase tolerance to ADLS and activities                        EVAL 36                        CURENT: , [address] be accomplished in 10-20 treatments:                        [id number] will have pain 3/10 to aid with increase tolerance to ADLS and household chores                        EVAL 7                        CURRENT: Met  0-1/10                          7  patient will have [address] improved to 51 to show increase tolerance to ADLS and activities                        EVAL 36                        CURENT: METRobert 5                        7 patient will report overall 50% improvement to aid with increase tolerance to household chores                        EVAL pain with household chores  [phone]: MET 50%                             6 patient will ambulate 300 feet with no increase pain for carryover to community ambulation and household activities                        EVAL R SC with base , 40-50 feet                        CURRENT: n/a        PLAN  []  Upgrade activities as tolerated     [x]  Continue plan of care  []  Update interventions per flow sheet       []  Discharge due to:_  []  Other:_      Sera Roth PTA 2/28/2018  2:25 PM    Future Appointments  Date Time Provider Nori Grande   2/28/2018 2:30 PM [address] [place] [address] [facility]   3/6/2018 11:00 AM [facility] Roth PTA [facility]   3/9/2018 11:30 AM Sera Roth PTA [facility]   3/13/2018 11:30 AM Ru Islas, PT [facility]   3/15/2018 11:00 AM Ru Warneru, PT [facility]   3/20/2018 10:00 AM Ru Islas, PT [facility]   3/22/2018 11:00 AM Ernestine Raiy, PT [facility]   3/26/2018 11:00 AM Ernestine Pry, PT [facility]   3/28/2018 11:00 AM Ernestine Pry, PT [facility]   4/2/2018 10:00 AM MD Ki White   4/23/2018 1:00 PM Jhoana Damian MD [address] [place] [address]  [facility]   5/31/2018 10:30 AM Amanda Unger MD [facility] [address]   7/16/2018 10:20 AM Moises Taylor MD [address] [place] [address]

## 2018-03-06 ENCOUNTER — HOSPITAL ENCOUNTER (OUTPATIENT)
Dept: PHYSICAL THERAPY | Age: 83
Discharge: HOME | End: 2018-03-06
Payer: MEDICARE

## 2018-03-06 PROCEDURE — 97110 THERAPEUTIC EXERCISES: CPT

## 2018-03-06 PROCEDURE — 97035 APP MDLTY 1+ULTRASOUND EA 15: CPT

## 2018-03-06 NOTE — PROGRESS NOTES
[facility] [address], [address] [place] [address], [address] [place], [place] [address]y [address]  [phone] fax      Medicare Progress Report    Patient name: Ilana Brewer Start of Care: 18   Referral source: Carly Frank,* : 1928   Medical/Treatment Diagnosis: Low back pain [M54.5] Onset Date:5/15/15     Prior Hospitalization: see medical history Provider#: [id number]   Medications: Verified on Patient Summary List     Comorbidities: DM, HTN, scoliosis   Prior Level of Function:I all areas, no AD, retired, household chores  Visits from [place] of Care: 8    Missed Visits: 1    Reporting Period: 18 to 3/6/18    Subjective Reports: reports pain with doing her household duties    Current Status/ treatment goals Objective measures   1.patient will have established and be I with HEP to aid with progression of skilled PT Program                           [] met                 [x] not met  [x] progressing Partial compliance reported   2.patient will have pain 5/10 to aid with increase tolerance to ADLS and household chores                          [x] met                 [] not met  [] progressing 0 3/6/17   3. patient will have FOTO improved to 41 to show increase tolerance to ADLS and activities                           [x] met                 [] not met  [] progressing 54 on 17  and 41 on 3/6/17   4.  patient will report overall 50% improvement to aid with increase tolerance to household chores                       [] met                 [x] not met  [x] progressing 25% 3/6/17     Key functional changes: decrease pain, improved FOTO, improved exercise tolerance    Problems/ barriers to goal attainment: residual pain and decrease tolerance to household chores     Assessment / Recommendations:Patient demonstrates the potential to make further gains with improving ROM, strength, endurance/activity tolerance, functional FOTO survey score  and all within a reasonable time frame so as to further increase their functional independence with ADLs and activities for carryover to  Improved quality of life and tolerance to household chores and community activities  . Patient requires skilled Physical Therapy so as to monitor their response to and modify their treatment plan accordingly. Patient appears to be an appropriate candidate for skilled outpatient Physical Therapy. Problem List: pain affecting function, decrease ROM, decrease strength, impaired gait/ balance, decrease ADL/ functional abilitiies, decrease activity tolerance, decrease flexibility/ joint mobility, decrease transfer abilities and other FOTO 41   Treatment Plan: Therapeutic exercise, Therapeutic activities, Neuromuscular re-education, Physical agent/modality, Gait/balance training, Manual therapy, Patient education, Self Care training, Functional mobility training, Home safety training and Stair training    Patient Goal (s) has been updated and includes: get stronger and walk better     Updated Goals to be accomplished in 10 treatments:                       1 patient will have FOTO improved to 51 to show increase tolerance to ADLS and activities                        DB 92                        RJRKMS:                             2 patient will report overall 50% improvement to aid with increase tolerance to household chores                       PN 25%                        CURRENT:                              2 patient will ambulate 300 feet with no increase pain for carryover to community ambulation and household activities                       PN Not assessed                        CURRENT:    Frequency / Duration: Patient to be seen 2-3 times per week for 10 treatments    G-Codes (GP)  Mobility  [id number] Current  CK= 40-59%   Goal  CK= 40-59%     The severity rating is based on clinical judgment and the FOTO score.         Tommy Lizarraga, PT 3/6/2018 12:50 PM

## 2018-03-06 NOTE — PROGRESS NOTES
PT DAILY TREATMENT NOTE - [facility]     Patient Name: Loulou Reading  Date:3/6/2018  : 1928  [x]  Patient  Verified  Payor: [place] / Plan: [place] MEDICARE PART A & B / Product Type: Medicare /    In time:11:19  Out time:12:10  Total Treatment Time (min): 31  Total Timed Codes (min): 31  1:1 Treatment Time ( only): 31  Visit #: 2 of 10-20    Treatment Area: Low back pain [M54.5]    SUBJECTIVE  Pain Level (0-10 scale): 0  Any medication changes, allergies to medications, adverse drug reactions, diagnosis change, or new procedure performed?: [x] No    [] Yes (see summary sheet for update)  Subjective functional status/changes:   [] No changes reported  Pain  When perform  Household  Duties.     OBJECTIVE    Modality rationale: decrease edema, decrease inflammation, decrease pain and increase tissue extensibility to improve the patients ability to perform ADL    Min Type Additional Details    [] Estim:  []Unatt       []IFC  []Premod                        []Other:  []w/ice   []w/heat  Position:  Location:    [] Estim: []Att    []TENS instruct  []NMES                    []Other:  []w/US   []w/ice   []w/heat  Position:  Location:    []  Traction: [] Cervical       []Lumbar                       [] Prone          []Supine                       []Intermittent   []Continuous Lbs:  [] before manual  [] after manual   8 [x]  Ultrasound: [x]Continuous   [] Pulsed                           [x]1MHz   []3MHz W/cm2:1.3  Location:(B) LSP    []  Iontophoresis with dexamethasone         Location: [] Take home patch   [] In clinic    []  Ice     []  heat  []  Ice massage  []  Laser   []  Anodyne Position:  Location:    []  Laser with stim  []  Other:  Position:  Location:    []  Vasopneumatic Device Pressure:       [] lo [] med [] hi   Temperature: [] lo [] med [] hi   [x] Skin assessment post-treatment:  [x]intact []redness- no adverse reaction    []redness  adverse reaction:      min []Eval                  []Re-Eval 23 min Therapeutic Exercise:  [x] See flow sheet :   Rationale: increase ROM and increase strength to improve the patients ability to perform ADL      min Therapeutic Activity:  []  See flow sheet :   Rationale:   to improve the patients ability to       min Neuromuscular Re-education:  []  See flow sheet :   Rationale:   to improve the patients ability to      min Manual Therapy:     Rationale: decrease pain, increase ROM, increase tissue extensibility and decrease edema  to perform ADL      min Gait Training:  ___ feet with ___ device on level surfaces with ___ level of assist   Rationale: With   [x] TE   [] TA   [] neuro   [] other: Patient Education: [x] Review HEP    [] Progressed/Changed HEP based on:   [] positioning   [] body mechanics   [] transfers   [] heat/ice application    [] other:      Other Objective/Functional Measures: FOTO 41    Pain Level (0-10 scale) post treatment: 0    ASSESSMENT/Changes in Function: Mrs. Ian Castle  Reports 25%  Improvement. pain level during the day  Doing household is  10/10. Pt  Is a little  Discouraged  About  Pain. FOTO  Has improved. Patient will continue to benefit from skilled PT services to address functional mobility deficits, address ROM deficits, address strength deficits, analyze and address soft tissue restrictions, analyze and cue movement patterns and instruct in home and community integration to attain remaining goals.      [x]  See Plan of Care  []  See progress note/recertification  []  See Discharge Summary         Progress towards goals / Updated goals:  Short Term Goals: To be accomplished in 5 treatments:                        8 patient will have established and be I with HEP to aid with progression of skilled PT Program                        EVAL issued                        ISRAEL: MET pt reported partial compliance                             7 patient will have pain 5/10 to aid with increase tolerance to ADLS and household chores                        EVAL 7                        CURRENT: 10  Not  Met  3/6/18                          7 patient will have FOTO improved to 41 to show increase tolerance to ADLS and activities                        EVAL 36                        CURENT: [address] be accomplished in 10-20 treatments:                        [id number] will have pain 3/10 to aid with increase tolerance to ADLS and household chores                        EVAL 7                        CURRENT:                           2  patient will have FOTO improved to 51 to show increase tolerance to ADLS and activities                        EVAL 36                        CURENT: MET, [address]                        2 patient will report overall 50% improvement to aid with increase tolerance to household chores                        EVAL pain with household chores  [phone]: 25%  3/6/18                             4 patient will ambulate 300 feet with no increase pain for carryover to community ambulation and household activities                        EVAL R SC with base , 40-50 feet                        CURRENT: n/a    PLAN  []  Upgrade activities as tolerated     []  Continue plan of care  []  Update interventions per flow sheet       []  Discharge due to:_  []  Other:_      Sera Roth PTA 3/6/2018  11:31 AM    Future Appointments  Date Time Provider Nori Grande   3/9/2018 11:30 AM Sera Roth PTA [facility]   3/13/2018 11:30 AM Hiral Avila, PT [facility]   3/15/2018 11:00 AM Hiral Avila, PT [facility]   3/20/2018 10:00 AM Hiral Avila PT [facility]   3/22/2018 11:00 AM Félix Schroeder PT [facility]   3/26/2018 11:00 AM Félix Schroeder PT [facility]   3/28/2018 11:00 AM Félix Schroeder PT [facility]   4/2/2018 10:00 AM Nelda Plascencia  [address]   4/23/2018 1:00 PM Carlos Guevara MD [place] [id number]   5/31/2018 10:30 AM Marlen Mead Diane Moulton MD [facility] [address] [facility] [address]   7/16/2018 10:20 AM Rich Ortiz MD [address] [place] [address]

## 2018-03-09 ENCOUNTER — HOSPITAL ENCOUNTER (OUTPATIENT)
Dept: PHYSICAL THERAPY | Age: 83
Discharge: HOME | End: 2018-03-09
Payer: MEDICARE

## 2018-03-09 PROCEDURE — 97110 THERAPEUTIC EXERCISES: CPT

## 2018-03-09 PROCEDURE — 97035 APP MDLTY 1+ULTRASOUND EA 15: CPT

## 2018-03-09 NOTE — PROGRESS NOTES
PT DAILY TREATMENT NOTE - [facility]     Patient Name: Winter Lincoln  Date:3/9/2018  : 1928  [x]  Patient  Verified  Payor: [place] MEDICARE / Plan: [place] MEDICARE PART A & B / Product Type: Medicare /    In time:11:30  Out time:12:30  Total Treatment Time (min): 51  Total Timed Codes (min): 51  1:1 Treatment Time ([facility] [place] [facility] only): 41  Visit #: 3 of 10    Treatment Area: Low back pain [M54.5]    SUBJECTIVE  Pain Level (0-10 scale): 0  Any medication changes, allergies to medications, adverse drug reactions, diagnosis change, or new procedure performed?: [x] No    [] Yes (see summary sheet for update)  Subjective functional status/changes:   [] No changes reported  That  US  Helps. I was able  To vacuum.   OBJECTIVE    Modality rationale: decrease edema, decrease inflammation, decrease pain and increase tissue extensibility to improve the patients ability to perform ADL    Min Type Additional Details    [] Estim:  []Unatt       []IFC  []Premod                        []Other:  []w/ice   []w/heat  Position:  Location:    [] Estim: []Att    []TENS instruct  []NMES                    []Other:  []w/US   []w/ice   []w/heat  Position:  Location:    []  Traction: [] Cervical       []Lumbar                       [] Prone          []Supine                       []Intermittent   []Continuous Lbs:  [] before manual  [] after manual   8 [x]  Ultrasound: [x]Continuous   [] Pulsed                           [x]1MHz   []3MHz W/cm2:1.3  Location:(L)  Upper  glute  region    []  Iontophoresis with dexamethasone         Location: [] Take home patch   [] In clinic    []  Ice     []  heat  []  Ice massage  []  Laser   []  Anodyne Position:  Location:    []  Laser with stim  []  Other:  Position:  Location:    []  Vasopneumatic Device Pressure:       [] lo [] med [] hi   Temperature: [] lo [] med [] hi   [x] Skin assessment post-treatment:  [x]intact []redness- no adverse reaction    []redness  adverse reaction:      min []Eval []Re-Eval       43  1:1  33 min Therapeutic Exercise:  [] See flow sheet :   Rationale: increase ROM and increase strength to improve the patients ability to perform ADL      min Therapeutic Activity:  []  See flow sheet :   Rationale:   to improve the patients ability to       min Neuromuscular Re-education:  []  See flow sheet :   Rationale:   to improve the patients ability to      min Manual Therapy:     Rationale: decrease pain, increase ROM, increase tissue extensibility and decrease edema  to perform ADL      min Gait Training:  ___ feet with ___ device on level surfaces with ___ level of assist   Rationale: With   [x] TE   [] TA   [] neuro   [] other: Patient Education: [x] Review HEP    [] Progressed/Changed HEP based on:   [] positioning   [] body mechanics   [] transfers   [] heat/ice application    [] other:      Other Objective/Functional Measures:  Responded  Fairly  Well  To each there ex. Pain Level (0-10 scale) post treatment:  0    ASSESSMENT/Changes in Function: Benefited  With US. Patient will continue to benefit from skilled PT services to address functional mobility deficits, address ROM deficits, address strength deficits, analyze and address soft tissue restrictions, analyze and cue movement patterns and instruct in home and community integration to attain remaining goals.      [x]  See Plan of Care  []  See progress note/recertification  []  See Discharge Summary         Progress towards goals / Updated goals:  Updated Goals to be accomplished in 10 treatments:                       1 patient will have FOTO improved to 51 to show increase tolerance to ADLS and activities                        PN 41                        CURENT:                             2 patient will report overall 50% improvement to aid with increase tolerance to household chores                       PN 25%                        CURRENT:                              2 patient will ambulate 300 feet with no increase pain for carryover to community ambulation and household activities                       NV Not assessed                        CURRENT:      PLAN  []  Upgrade activities as tolerated     []  Continue plan of care  []  Update interventions per flow sheet       []  Discharge due to:_  [x]  Other:_Trial Ambulation  NV      Sera Roth PTA 3/9/2018  11:46 AM    Future Appointments  Date Time Provider [place]i [place]   3/13/2018 11:30 AM KRISH Redd [facility]   3/15/2018 11:00 AM Margo Coon, PT [facility]   3/20/2018 10:00 AM Margo Coon, PT [facility]   3/22/2018 11:00 AM Josh Denver, PT [facility]   3/26/2018 11:00 AM Josh Denver, PT [facility]   3/28/2018 11:00 AM Areta River [facility]   4/2/2018 10:00 AM Debra Santa MD [facility]   4/23/2018 1:00 PM John Mclean MD [place] [address]   5/31/2018 10:30 AM Priyank Arroyo MD [facility]   7/16/2018 10:20 AM Crissy Chavez MD [address] [place] [address]

## 2018-03-13 ENCOUNTER — HOSPITAL ENCOUNTER (OUTPATIENT)
Dept: PHYSICAL THERAPY | Age: 83
Discharge: HOME | End: 2018-03-13
Payer: MEDICARE

## 2018-03-13 PROCEDURE — 97110 THERAPEUTIC EXERCISES: CPT

## 2018-03-13 PROCEDURE — G8978 MOBILITY CURRENT STATUS: HCPCS

## 2018-03-13 PROCEDURE — G8979 MOBILITY GOAL STATUS: HCPCS

## 2018-03-13 NOTE — PROGRESS NOTES
PT DAILY TREATMENT NOTE - [facility]     Patient Name: Carol Dose  Date:3/13/2018  : 1928  [x]  Patient  Verified  Payor: Maame Pastrana / Plan: VA MEDICARE PART A & B / Product Type: Medicare /    In time:11:31  Out time:12:16  Total Treatment Time (min): 38  Total Timed Codes (min): 38  1:1 Treatment Time ( [address] only): 8  Visit #: 4 of 10    Treatment Area: Low back pain [M54.5]    SUBJECTIVE  Pain Level (0-10 scale): 0  Any medication changes, allergies to medications, adverse drug reactions, diagnosis change, or new procedure performed?: [x] No    [] Yes (see summary sheet for update)  Subjective functional status/changes:   [] No changes reported  I took  All the  [employer].     OBJECTIVE    Modality rationale: decrease edema, decrease inflammation, decrease pain and increase tissue extensibility to improve the patients ability to perform ADL    Min Type Additional Details    [] Estim:  []Unatt       []IFC  []Premod                        []Other:  []w/ice   []w/heat  Position:  Location:    [] Estim: []Att    []TENS instruct  []NMES                    []Other:  []w/US   []w/ice   []w/heat  Position:  Location:    []  Traction: [] Cervical       []Lumbar                       [] Prone          []Supine                       []Intermittent   []Continuous Lbs:  [] before manual  [] after manual    []  Ultrasound: []Continuous   [] Pulsed                           []1MHz   []3MHz W/cm2:  Location:    []  Iontophoresis with dexamethasone         Location: [] Take home patch   [] In clinic    []  Ice     []  heat  []  Ice massage  []  Laser   []  Anodyne Position:  Location:    []  Laser with stim  []  Other:  Position:  Location:    []  Vasopneumatic Device Pressure:       [] lo [] med [] hi   Temperature: [] lo [] med [] hi   [x] Skin assessment post-treatment:  [x]intact []redness- no adverse reaction    []redness  adverse reaction:      min []Eval                  []Re-Eval       38  1:1  8 min Therapeutic Exercise:  [x] See flow sheet :   Rationale: increase ROM and increase strength to improve the patients ability to perform ADL      min Therapeutic Activity:  []  See flow sheet :   Rationale:   to improve the patients ability to       min Neuromuscular Re-education:  []  See flow sheet :   Rationale:   to improve the patients ability to      min Manual Therapy:    Rationale: decrease pain, increase ROM, increase tissue extensibility and decrease edema  to perform ADL      min Gait Training:  ___ feet with ___ device on level surfaces with ___ level of assist   Rationale: With   [x] TE   [] TA   [] neuro   [] other: Patient Education: [x] Review HEP    [] Progressed/Changed HEP based on:   [] positioning   [] body mechanics   [] transfers   [] heat/ice application    [] other:      Other Objective/Functional Measures: FOTO:  46    Pain Level (0-10 scale) post treatment: 0  sore    ASSESSMENT/Changes in Function: FOTO  Has  Improved  since  Initial eval    Patient will continue to benefit from skilled PT services to address functional mobility deficits, address ROM deficits, address strength deficits, analyze and address soft tissue restrictions, analyze and cue movement patterns and instruct in home and community integration\ to attain remaining goals.      [x]  See Plan of Care  []  See progress note/recertification  []  See Discharge Summary         Progress towards goals / Updated goals:  Updated Goals to be accomplished in 10 treatments:                       1 patient will have FOTO improved to 51 to show increase tolerance to ADLS and activities                        PN 41                        CURENT:   46 3/13/18                           2 patient will report overall 50% improvement to aid with increase tolerance to household chores                       PN 25%                        CURRENT:                              1 MLHTPRL will ambulate 300 feet with no increase pain for carryover to community ambulation and household activities                       CB Not assessed                        CURRENT:         PLAN  []  Upgrade activities as tolerated     [x]  Continue plan of care  []  Update interventions per flow sheet       []  Discharge due to:_  []  Other:_      Sera Roth, PTA 3/13/2018  11:35 AM    Future Appointments  Date Time Provider Nori Grande   3/15/2018 11:00 AM Mil Rodriguez, PT [facility]   3/20/2018 10:00 AM Mil Rodriguez, PT [facility]   3/22/2018 11:00 AM Barbi Malcolm, PT [facility]   3/26/2018 11:00 AM Barbi Malcolm, PT [facility]   3/28/2018 11:00 AM Richar Hayden [facility]   4/2/2018 10:00 AM Hank Luo MD [address] [place] [address]   4/23/2018 1:00 PM Collette Stewart MD [facility]   5/31/2018 10:30 AM Keith Durbin MD [address]   7/16/2018 10:20 AM Meeta Marquez MD [address] [place] [address]

## 2018-03-15 ENCOUNTER — APPOINTMENT (OUTPATIENT)
Dept: PHYSICAL THERAPY | Age: 83
End: 2018-03-15
Payer: MEDICARE

## 2018-03-20 ENCOUNTER — HOSPITAL ENCOUNTER (OUTPATIENT)
Dept: PHYSICAL THERAPY | Age: 83
Discharge: HOME | End: 2018-03-20
Payer: MEDICARE

## 2018-03-20 PROCEDURE — 97140 MANUAL THERAPY 1/> REGIONS: CPT

## 2018-03-20 PROCEDURE — 97110 THERAPEUTIC EXERCISES: CPT

## 2018-03-20 PROCEDURE — 97035 APP MDLTY 1+ULTRASOUND EA 15: CPT

## 2018-03-20 NOTE — PROGRESS NOTES
PT DAILY TREATMENT NOTE - [facility]     Patient Name: Ilana Brewer  Date:3/20/2018  : 1928  [x]  Patient  Verified  Payor: Emiliano Sasha / Plan: [place] MEDICARE PART A & B / Product Type: Medicare /    In time:940  Out time:1040  Total Treatment Time (min): 53  Total Timed Codes (min): 53  1:1 Treatment Time ( only): 38   Visit #: 5 of 10    Treatment Area: Low back pain [M54.5]    SUBJECTIVE  Pain Level (0-10 scale): 5 back. Any medication changes, allergies to medications, adverse drug reactions, diagnosis change, or new procedure performed?: [x] No    [] Yes (see summary sheet for update)  Subjective functional status/changes:   [] No changes reported  With my knee it is 100% better. With my back I am still having pain about 5/10. I try not to push myself. It tires me out.  I have     OBJECTIVE    Modality rationale: decrease inflammation, decrease pain and increase tissue extensibility to improve the patients ability to aid with increase toelrance to ADLS and activities   Min Type Additional Details    [] Estim:  []Unatt       []IFC  []Premod                        []Other:  []w/ice   []w/heat  Position:  Location:    [] Estim: []Att    []TENS instruct  []NMES                    []Other:  []w/US   []w/ice   []w/heat  Position:  Location:    []  Traction: [] Cervical       []Lumbar                       [] Prone          []Supine                       []Intermittent   []Continuous Lbs:  [] before manual  [] after manual   8 [x]  Ultrasound: [x]Continuous   [] Pulsed                           [x]1MHz   []3MHz W/cm2:1.3  Location: Left LS and SIJ/superior glut    []  Iontophoresis with dexamethasone         Location: [] Take home patch   [] In clinic    []  Ice     []  heat  []  Ice massage  []  Laser   []  Anodyne Position:  Location:    []  Laser with stim  []  Other:  Position:  Location:    []  Vasopneumatic Device Pressure:       [] lo [] med [] hi   Temperature: [] lo [] med [] hi   [] Skin assessment post-treatment:  []intact []redness- no adverse reaction    []redness  adverse reaction:       min []Eval                  []Re-Eval       32 min Therapeutic Exercise:  [x] See flow sheet :   Rationale: increase ROM, increase strength and improve coordination to improve the patients ability to aid with increase tolerance to ADLS and activities     min Therapeutic Activity:  []  See flow sheet :   Rationale:   to improve the patients ability to       min Neuromuscular Re-education:  []  See flow sheet :   Rationale:   to improve the patients ability to     13 min Manual Therapy:  STM DTM TPR Left LS/ SIJ/Piriformis in right SL   Rationale: decrease pain, increase ROM, increase tissue extensibility and decrease trigger points to aid with increase tolerance to ADLS and activities     min Gait Training:  ___ feet with ___ device on level surfaces with ___ level of assist   Rationale: With   [] TE   [] TA   [] neuro   [] other: Patient Education: [x] Review HEP    [] Progressed/Changed HEP based on:   [] positioning   [] body mechanics   [] transfers   [] heat/ice application    [] other:      Other Objective/Functional Measures: VC exercises and tech    Pain Level (0-10 scale) post treatment: <5    ASSESSMENT/Changes in Function: tolerated well. Residual STC Left LS and piriformis    Patient will continue to benefit from skilled PT services to modify and progress therapeutic interventions, address functional mobility deficits, address ROM deficits, address strength deficits, analyze and address soft tissue restrictions, analyze and cue movement patterns, analyze and modify body mechanics/ergonomics, assess and modify postural abnormalities and instruct in home and community integration to attain remaining goals.      [x]  See Plan of Care  []  See progress note/recertification  []  See Discharge Summary            Updated Goals to be accomplished in 10 treatments:                       0 SEZLEJH will have FOTO improved to 51 to show increase tolerance to ADLS and activities                        PN 41                        CURENT:   46 3/13/18                           2 patient will report overall 50% improvement to aid with increase tolerance to household chores                       PN 25%                        CURRENT:  Left knee 100%,  Back pain still 5/10 3/20/18                            3 patient will ambulate 300 feet with no increase pain for carryover to community ambulation and household activities                       MJ Not assessed                        CURRENT:      PLAN  [x]  Upgrade activities as tolerated     [x]  Continue plan of care  []  Update interventions per flow sheet       []  Discharge due to:_  []  Other:_      Geo Burger, PT 3/20/2018  10:02 AM    Future Appointments  Date Time Provider Nori Grande   3/22/2018 11:00 AM Para Vince, PT [facility]   3/26/2018 11:00 AM Para Vince, PT [facility]   3/28/2018 11:00 AM Saqib Dunbar [facility]   4/2/2018 10:00 AM Ayse Fernandez MD [facility]   4/23/2018 1:00 PM [facility]   5/31/2018 10:30 AM Kory Pérez MD [facility]   7/16/2018 10:20 AM Fannie Madden MD [address] [place] [address]

## 2018-03-22 ENCOUNTER — HOSPITAL ENCOUNTER (OUTPATIENT)
Dept: PHYSICAL THERAPY | Age: 83
Discharge: HOME | End: 2018-03-22
Payer: MEDICARE

## 2018-03-22 PROCEDURE — 97035 APP MDLTY 1+ULTRASOUND EA 15: CPT

## 2018-03-22 PROCEDURE — 97110 THERAPEUTIC EXERCISES: CPT

## 2018-03-22 PROCEDURE — 97140 MANUAL THERAPY 1/> REGIONS: CPT

## 2018-03-22 NOTE — PROGRESS NOTES
PT DAILY TREATMENT NOTE - [facility]     Patient Name: Brigido Bass  Date:3/22/2018  : 1928  [x]  Patient  Verified  Payor: VA MEDICARE / Plan: VA MEDICARE PART A & B / Product Type: Medicare /    In time:1054  Out time:1159  Total Treatment Time (min): 58  Total Timed Codes (min): 58  1:1 Treatment Time ( only): 62  Visit #: 6 of 10    Treatment Area: Low back pain [M54.5]    SUBJECTIVE  Pain Level (0-10 scale): 0  Any medication changes, allergies to medications, adverse drug reactions, diagnosis change, or new procedure performed?: [x] No    [] Yes (see summary sheet for update)  Subjective functional status/changes:   [] No changes reported  It is not as tender or tight back there. It felt pretty good after I left the last time.      OBJECTIVE    Modality rationale: decrease inflammation, decrease pain and increase tissue extensibility to improve the patients ability to aid with increase tolerance to ADLs and activities   Min Type Additional Details    [] Estim:  []Unatt       []IFC  []Premod                        []Other:  []w/ice   []w/heat  Position:  Location:    [] Estim: []Att    []TENS instruct  []NMES                    []Other:  []w/US   []w/ice   []w/heat  Position:  Location:    []  Traction: [] Cervical       []Lumbar                       [] Prone          []Supine                       []Intermittent   []Continuous Lbs:  [] before manual  [] after manual   8 [x]  Ultrasound: [x]Continuous   [] Pulsed                           [x]1MHz   []3MHz W/cm2:1.3  Location:left piriformis    []  Iontophoresis with dexamethasone         Location: [] Take home patch   [] In clinic    []  Ice     []  heat  []  Ice massage  []  Laser   []  Anodyne Position:  Location:    []  Laser with stim  []  Other:  Position:  Location:    []  Vasopneumatic Device Pressure:       [] lo [] med [] hi   Temperature: [] lo [] med [] hi   [] Skin assessment post-treatment:  []intact []redness- no adverse reaction []redness  adverse reaction:      min []Eval                  []Re-Eval       37 min Therapeutic Exercise:  [x] See flow sheet :   Rationale: increase ROM, increase strength and improve coordination to improve the patients ability to aid with increase tolerance to ADLS and activities     min Therapeutic Activity:  []  See flow sheet :   Rationale:   to improve the patients ability to       min Neuromuscular Re-education:  []  See flow sheet :   Rationale:   to improve the patients ability to     13 min Manual Therapy:  STM DTM TPR Left buttock/superior glut   Rationale: decrease pain, increase ROM, increase tissue extensibility and decrease trigger points to aid with increase tolerance to ADLS and activities     min Gait Training:  ___ feet with ___ device on level surfaces with ___ level of assist   Rationale: With   [] TE   [] TA   [] neuro   [] other: Patient Education: [x] Review HEP    [] Progressed/Changed HEP based on:   [] positioning   [] body mechanics   [] transfers   [] heat/ice application    [] other:      Other Objective/Functional Measures: VC exercises and tech    Pain Level (0-10 scale) post treatment: 0    sore    ASSESSMENT/Changes in Function: STC TTP  Left piriformis    Patient will continue to benefit from skilled PT services to modify and progress therapeutic interventions, address functional mobility deficits, address ROM deficits, address strength deficits, analyze and address soft tissue restrictions, analyze and cue movement patterns, analyze and modify body mechanics/ergonomics, assess and modify postural abnormalities and instruct in home and community integration to attain remaining goals.      [x]  See Plan of Care  [x]  See progress note/recertification  []  See Discharge Summary         Progress towards goals / Updated goals:  Updated Goals to be accomplished in 10 treatments:                       8 SRGLVKK will have FOTO improved to 51 to show increase tolerance to ADLS and activities                        VY 14                        MOVUBB:   49 3/13/18                           2 patient will report overall 50% improvement to aid with increase tolerance to household chores                       PN 25%                        CURRENT:  Left knee 100%,  Back pain still 5/10 3/20/18                            3 patient will ambulate 300 feet with no increase pain for carryover to community ambulation and household activities                       BG Not assessed                        CURRENT:      PLAN  [x]  Upgrade activities as tolerated     [x]  Continue plan of care  []  Update interventions per flow sheet       []  Discharge due to:_  []  Other:_      John Parisi, PT 3/22/2018  11:02 AM    Future Appointments  Date Time Provider Nori [place]   3/26/2018 11:00 AM Idris Mcghee   3/28/2018 11:00 AM Deirdre Allen [facility]   4/2/2018 10:00 AM Dominga Do MD [address]   4/23/2018 1:00 PM Ju Flores MD [facility]   5/31/2018 10:30 AM Vijay Russell MD [facility]   7/16/2018 10:20 AM Shay Larkin MD [address] [place] [address]

## 2018-03-26 ENCOUNTER — HOSPITAL ENCOUNTER (OUTPATIENT)
Dept: PHYSICAL THERAPY | Age: 83
Discharge: HOME | End: 2018-03-26
Payer: MEDICARE

## 2018-03-26 PROCEDURE — 97140 MANUAL THERAPY 1/> REGIONS: CPT

## 2018-03-26 PROCEDURE — 97035 APP MDLTY 1+ULTRASOUND EA 15: CPT

## 2018-03-26 PROCEDURE — 97110 THERAPEUTIC EXERCISES: CPT

## 2018-03-26 NOTE — PROGRESS NOTES
PT DAILY TREATMENT NOTE - [facility] 3-16    Patient Name: Rosa Levin  Date:3/26/2018  : 1928  [x]  Patient  Verified  Payor: [place] MED[place]ARE / Plan: [place] MEDICARE PART A & B / Product Type: Medicare /    In time:1057  Out time:1207  Total Treatment Time (min): 67  Total Timed Codes (min): 57  1:1 Treatment Time ( only): 38   Visit #: 7 of 10    Treatment Area: Low back pain [M54.5]    SUBJECTIVE  Pain Level (0-10 scale): 0  Any medication changes, allergies to medications, adverse drug reactions, diagnosis change, or new procedure performed?: [x] No    [] Yes (see summary sheet for update)  Subjective functional status/changes:   [] No changes reported  Just a little sore    OBJECTIVE  Modality rationale: decrease inflammation, decrease pain and increase tissue extensibility to improve the patients ability to perform increased ADL   Min Type Additional Details    [] Estim:  []Unatt       []IFC  []Premod                        []Other:  []w/ice   []w/heat  Position:  Location:    [] Estim: []Att    []TENS instruct  []NMES                    []Other:  []w/US   []w/ice   []w/heat  Position:  Location:    []  Traction: [] Cervical       []Lumbar                       [] Prone          []Supine                       []Intermittent   []Continuous Lbs:  [] before manual  [] after manual    [x]  Ultrasound: [x]Continuous   [] Pulsed       8 min                    [x]1MHz   []3MHz Location: Left buttocks/piriformis  W/cm2: 1.5    []  Iontophoresis with dexamethasone         Location: [] Take home patch   [] In clinic    []  Ice     []  heat  []  Ice massage  []  Laser   []  Anodyne Position:  Location:    []  Laser with stim  []  Other: Position:  Location:    []  Vasopneumatic Device Pressure:       [] lo [] med [] hi   Temperature: [] lo [] med [] hi   [x] Skin assessment post-treatment:  [x]intact []redness- no adverse reaction    []redness  adverse reaction:     33 min Therapeutic Exercise:  [x] See flow sheet : Rationale: increase ROM, increase strength and improve coordination to improve the patients ability to tolerate increased activity levels  16 min Manual Therapy:  STM/DTM Left Glute/Piriformis region   Rationale: decrease pain, increase ROM, increase tissue extensibility and decrease trigger points to perform increased ADL       With   [x] TE   [] TA   [] neuro   [] other: Patient Education: [x] Review HEP    [] Progressed/Changed HEP based on:   [] positioning   [] body mechanics   [] transfers   [] heat/ice application    [] other:      Other Objective/Functional Measures:   - TTP with muscle tightness Left buttocks at superior glute/piriformis  - Good response to manual therapy with decreased muscle tightness after treatment     Pain Level (0-10 scale) post treatment: 0    ASSESSMENT/Changes in Function:      Patient will continue to benefit from skilled PT services to modify and progress therapeutic interventions, address functional mobility deficits, address ROM deficits, address strength deficits, analyze and address soft tissue restrictions, analyze and cue movement patterns and analyze and modify body mechanics/ergonomics to attain remaining goals.      []  See Plan of Care  []  See progress note/recertification  []  See Discharge Summary         Progress towards goals / Updated goals:  Updated Goals to be accomplished in 10 treatments:                       6 OCFBZTP will have FOTO improved to 51 to show increase tolerance to ADLS and activities                        PN 41                        CURENT:   46 3/13/18                           2 patient will report overall 50% improvement to aid with increase tolerance to household chores                       PN 25%                        CURRENT:  Left knee 100%,  Back pain still 5/10 3/20/18                            3 patient will ambulate 300 feet with no increase pain for carryover to community ambulation and household activities                       BH Not assessed                        CURRENT:      PLAN  [x]  Upgrade activities as tolerated     [x]  Continue plan of care  []  Update interventions per flow sheet       []  Discharge due to:_  []  Other:_      Earl Lane, PT 3/26/2018  11:24 AM    Future Appointments  Date Time Provider Nori Grande   3/28/2018 11:00 AM Ene TAPIA MARY BEH [facility]   4/2/2018 10:00 AM Nadene Barthel, MD [address] [facility]   4/23/2018 1:00 PM Harrison Joseph MD [place] [address]   5/31/2018 10:30 AM Norm Grewal MD [facility]   7/16/2018 10:20 AM Betty Dickerson MD [address] [place] [address]

## 2018-03-28 ENCOUNTER — HOSPITAL ENCOUNTER (OUTPATIENT)
Dept: PHYSICAL THERAPY | Age: 83
Discharge: HOME | End: 2018-03-28
Payer: MEDICARE

## 2018-03-28 PROCEDURE — 97035 APP MDLTY 1+ULTRASOUND EA 15: CPT

## 2018-03-28 PROCEDURE — 97140 MANUAL THERAPY 1/> REGIONS: CPT

## 2018-03-28 PROCEDURE — 97110 THERAPEUTIC EXERCISES: CPT

## 2018-03-28 NOTE — PROGRESS NOTES
PT DAILY TREATMENT NOTE - [facility]     Patient Name: Beena Pineda  Date:3/28/2018  : 1928  [x]  Patient  Verified  Payor: [place] MEDICARE / Plan: [place] MEDICARE PART A & B / Product Type: Medicare /    In time:10:51  Out time:11:52  Total Treatment Time (min): 61  Total Timed Codes (min): 42  1:1 Treatment Time ( [address] only): 32  Visit #: 8 of 10    Treatment Area: Low back pain [M54.5]    SUBJECTIVE  Pain Level (0-10 scale): 410  Any medication changes, allergies to medications, adverse drug reactions, diagnosis change, or new procedure performed?: [x] No    [] Yes (see summary sheet for update)  Subjective functional status/changes:   [] No changes reported  The pain increased after the last visit, but I am thinking that that is what I need to get better. OBJECTIVE    Modality rationale: decrease edema, decrease inflammation, decrease pain, increase tissue extensibility and increase muscle contraction/control to improve the patients ability to return to her normal activities.    Min Type Additional Details    [] Estim:  []Unatt       []IFC  []Premod                        []Other:  []w/ice   []w/heat  Position:  Location:    [] Estim: []Att    []TENS instruct  []NMES                    []Other:  []w/US   []w/ice   []w/heat  Position:  Location:    []  Traction: [] Cervical       []Lumbar                       [] Prone          []Supine                       []Intermittent   []Continuous Lbs:  [] before manual  [] after manual   8 [x]  Ultrasound: [x]Continuous   [] Pulsed                           [x]1MHz   []3MHz W/cm2:  Location: R sidelying    []  Iontophoresis with dexamethasone         Location: [] Take home patch   [] In clinic   10 [x]  Ice     []  heat  []  Ice massage  []  Laser   []  Anodyne Position:(L) hip  Location:sidelying    []  Laser with stim  []  Other:  Position:  Location:    []  Vasopneumatic Device Pressure:       [] lo [] med [] hi   Temperature: [] lo [] med [] hi   [x] Skin assessment post-treatment:  [x]intact [x]redness- no adverse reaction    []redness  adverse reaction:       16  1:1 min Therapeutic Exercise:  [x] See flow sheet :   Rationale: increase ROM, increase strength, improve coordination, improve balance and increase proprioception to improve the patients ability to return to her normal activities. 8 min Manual Therapy:  STM,DTM,   Rationale: decrease pain, increase ROM and decrease edema  to return to walking without discomfort            With   [x] TE   [] TA   [] neuro   [] other: Patient Education: [x] Review HEP    [] Progressed/Changed HEP based on:   [x] positioning   [x] body mechanics   [] transfers   [] heat/ice application    [x] other: spine alignment with ambulation and in supine     Other Objective/Functional Measures: Pt with moderate flexed trunk and forward head with ambulation which decreases her balance and coordination. Pain Level (0-10 scale) post treatment: 2/10    ASSESSMENT/Changes in Function: Pt with moderate discomfort during manual therapy this session. Therapist was able to perform STM with improved tolerance. Patient will continue to benefit from skilled PT services to modify and progress therapeutic interventions, address functional mobility deficits, address ROM deficits, address strength deficits and analyze and modify body mechanics/ergonomics to attain remaining goals.      [x]  See Plan of Care  []  See progress note/recertification  []  See Discharge Summary         Progress towards goals / Updated goals:  Updated Goals to be accomplished in 10 treatments:                       3 KYILMQW will have FOTO improved to 51 to show increase tolerance to ADLS and activities                        PN 41                        CURENT:   46 3/13/18                           2 patient will report overall 50% improvement to aid with increase tolerance to household chores                       PN 25%                        CURRENT:  Left knee 100%,  Back pain still 5/10 3/20/18                            3 patient will ambulate 300 feet with no increase pain for carryover to community ambulation and household activities                       QZ Not assessed                        CURRENT:    PLAN  [x]  Upgrade activities as tolerated     [x]  Continue plan of care  []  Update interventions per flow sheet       []  Discharge due to:_  []  Other:_      Conoreda Cava 3/28/2018  11:13 AM    Future Appointments  Date Time Provider Nori Grande   4/2/2018 10:00 [facility]   4/23/2018 1:00 PM Aline Olivares MD [place] [address]   5/31/2018 10:30 AM Evelyn Salgado MD [facility]   7/16/2018 10:20 AM Saroj Henderson MD [address] [place] [address]

## 2018-04-04 ENCOUNTER — HOSPITAL ENCOUNTER (OUTPATIENT)
Dept: PHYSICAL THERAPY | Age: 83
Discharge: HOME | End: 2018-04-04
Payer: MEDICARE

## 2018-04-04 PROCEDURE — 97035 APP MDLTY 1+ULTRASOUND EA 15: CPT

## 2018-04-04 PROCEDURE — 97110 THERAPEUTIC EXERCISES: CPT

## 2018-04-04 PROCEDURE — G8979 MOBILITY GOAL STATUS: HCPCS

## 2018-04-04 PROCEDURE — 97116 GAIT TRAINING THERAPY: CPT

## 2018-04-04 PROCEDURE — G8978 MOBILITY CURRENT STATUS: HCPCS

## 2018-04-04 NOTE — PROGRESS NOTES
[facility] [address], [address] [place] [address], [facility] [address]  [phone] fax      Continued Plan of Care/ Re-certification for Physical Therapy Services    Patient name: Mena Barksdale Start of Care: 18   Referral source: Mónica Goyal,* : 1928   Medical/Treatment Diagnosis: Low back pain [M54.5] Onset Date:5/15/15   Prior Hospitalization: see medical history Provider#: [id number]   Medications: Verified on Patient Summary List      Comorbidities: DM, HTN, scoliosis   Prior Level of Function:I all areas, no AD, retired, household chores  Visits from [facility] Care: 15    Missed Visits: 1    The Plan of Care and following information is based on the patient's current status:  Goal:patient will have FOTO improved to 51 to show increase tolerance to ADLS and activities  Status at last note/certification:will assess at last visit  Current Status: not met    Goal:patient will report overall 50% improvement to aid with increase tolerance to household chores  Status at last note/certification:not met, 30% improvement   Current Status: not met    Goal:patient will ambulate 300 feet with no increase pain for carryover to community ambulation and household activities  Status at last note/certification:not met, ambulated 130 feet x 2 (with sitting rest break in-between) with SPC device on level surfaces with SBA level of assist   Current Status: not met    Key functional changes:   Functional Gains: knee pain has improved  Functional Deficits: still has pain with household tasks and walking/standing  Pain                   Best: 0/10 \"pressure\"                          Worst: >10/10 \"yesterday it was very painful\"      Problems/ barriers to goal attainment: none     Problem List: pain affecting function, decrease ROM, decrease strength, impaired gait/ balance, decrease ADL/ functional abilitiies, decrease activity tolerance, decrease flexibility/ joint mobility and decrease transfer abilities    Treatment Plan: Therapeutic exercise, Therapeutic activities, Neuromuscular re-education, Physical agent/modality, Gait/balance training, Manual therapy, Patient education, Self Care training, Functional mobility training, Home safety training and Stair training     Patient Goal (s) has been updated and includes: decrease the pain     Goals for this certification period to be accomplished in 1 treatments:  1 patient will have FOTO improved to 51 to show increase tolerance to ADLS and activities  PN: will assess at last visit   2 patient will report overall 50% improvement to aid with increase tolerance to household chores  PN:  30% improvement   3 patient will ambulate 300 feet with no increase pain for carryover to community ambulation and household activities  PN: not met, ambulated 130 feet x 2 (with sitting rest break in-between) with SPC device on level surfaces with SBA level of assist   4. Pt will report understanding of updated HEP to improve independent management of symptoms when d/c'ed from therapy. PN: will provide updated HEP handout when pt is d/c'ed    Frequency / Duration: Patient to be seen 2 times per week for 1 treatments:    Assessment / Recommendations:  Pt reports having improvement in her left knee pain since starting therapy. Pt continues to have pain in the left glute region at times and seems to have flare ups on certain days. Pt reports having 1 more therapy appointment next week. Pt states she would like to attend that therapy appointment and will plan on d/c'ing pt on that therapy visit. Pt reports having a MD appointment on 4/23/2018. Will provide pt with updated HEP when pt is d/c'ed from therapy. G-Codes (GP)  Mobility  [id number] Current  CK= 40-59%  [id number] Goal  CK= 40-59%    The severity rating is based on clinical judgment and the FOTO score.     Certification Period: 4/4/2018-5/3/2018    Andre Mera, PT 4/4/2018 2:08 PM    ________________________________________________________________________  I certify that the above Therapy Services are being furnished while the patient is under my care. I agree with the treatment plan and certify that this therapy is necessary. [] I have read the above and request that my patient continue as recommended.   [] I have read the above report and request that my patient continue therapy with the following changes/special instructions: _____________________________________________  [] I have read the above report and request that my patient be discharged from therapy    Physician's Signature:____________________________________Date:___________Time:__________    Please sign and return to [facility] [address] [facility] [address] [facility]  [address] [place] [address], [address] [place] [address] rodney, [address] [facility] [address],[place] [address]  [phone] fax

## 2018-04-04 NOTE — PROGRESS NOTES
PT DAILY TREATMENT NOTE - [facility]     Patient Name: Ham Childers  Date:2018  : 1928  [x]  Patient  Verified  Payor: [place] MEDICARE / Plan: [place] MEDICARE PART A & B / Product Type: Medicare /    In time: 10:26  Out time: 11:39  Total Treatment Time (min): 73  Total Timed Codes (min): 73  1:1 Treatment Time ( [address] only): 52  Visit #: 9 of 10    Treatment Area: Low back pain [M54.5]    SUBJECTIVE  Pain Level (0-10 scale): 0  Any medication changes, allergies to medications, adverse drug reactions, diagnosis change, or new procedure performed?: [x] No    [] Yes (see summary sheet for update)  Subjective functional status/changes:   [] No changes reported  \"I was really hurting yesterday. It was more than 10/10 pain. It is better today but it seems like something is pushing in my low back and left buttock\". OBJECTIVE    Modality rationale: decrease inflammation, decrease pain and increase tissue extensibility to improve the patients ability to return to her normal activities.    Min Type Additional Details    [] Estim:  []Unatt       []IFC  []Premod                        []Other:  []w/ice   []w/heat  Position:  Location:    [] Estim: []Att    []TENS instruct  []NMES                    []Other:  []w/US   []w/ice   []w/heat  Position:  Location:    []  Traction: [] Cervical       []Lumbar                       [] Prone          []Supine                       []Intermittent   []Continuous Lbs:  [] before manual  [] after manual   8 [x]  Ultrasound: [x]Continuous   [] Pulsed                           [x]1MHz   []3MHz W/cm2: 1.2-1.3  Location:left piriformis and glutes    []  Iontophoresis with dexamethasone         Location: [] Take home patch   [] In clinic    []  Ice     []  heat  []  Ice massage  []  Laser   []  Anodyne Position:  Location:    []  Laser with stim  []  Other:  Position:  Location:    []  Vasopneumatic Device Pressure:       [] lo [] med [] hi   Temperature: [] lo [] med [] hi   [x] Skin assessment post-treatment:  [x]intact [x]redness- no adverse reaction    []redness  adverse reaction:     53 min Therapeutic Exercise: X See flow sheet : exercises, goal reassessment, instruction of sciatic nerve glide   Rationale: increase ROM, increase strength, improve coordination, improve balance and increase proprioception to improve the patients ability to return to her normal activities. 4 min Manual Therapy:  STM left piriformis    Rationale: decrease pain, increase ROM and decrease edema  to return to walking without discomfort    8 min Gait Trainin feet x 2 (with sitting rest break in-between) with SPC device on level surfaces with SBA level of assist   Rationale: improve mobility and efficiency with ambulation. With   [x] TE   [] TA   [] neuro   [] other: Patient Education: [x] Review HEP    [] Progressed/Changed HEP based on:   [x] positioning   [x] body mechanics   [] transfers   [] heat/ice application    [x] other: spine alignment with ambulation and in supine     Other Objective/Functional Measures: See goals below. Added mini squats and sciatic nerve glide to improve mobility and pain in the LEs. Needed verbal and visual cueing from therapist for proper form of mini squats to improve glute activation. Needed sitting rest break during gait today secondary to pt report of having some shortness of breath. Improvement of this reported with sitting rest break. Functional Gains: knee pain has improved  Functional Deficits: still has pain with household tasks and walking/standing  Pain        Best: 0/10 \"pressure\"     Worst: >10/10 \"yesterday it was very painful\"    Pain Level (0-10 scale) post treatment: 0    ASSESSMENT/Changes in Function: See re-certification. Pt reports having improvement in her left knee pain since starting therapy. Pt continues to have pain in the left glute region at times and seems to have flare ups on certain days.  Pt reports having 1 more therapy appointment next week. Pt states she would like to attend that therapy appointment and will plan on d/c'ing pt on that therapy visit. Pt reports having a MD appointment on 4/23/2018. Will provide pt with updated HEP when pt is d/c'ed from therapy. Patient will continue to benefit from skilled PT services to modify and progress therapeutic interventions, address functional mobility deficits, address ROM deficits, address strength deficits, analyze and address soft tissue restrictions, analyze and cue movement patterns, analyze and modify body mechanics/ergonomics, address imbalance/dizziness and instruct in home and community integration to attain remaining goals.      []  See Plan of Care  [x]  See progress note/recertification  []  See Discharge Summary         Progress towards goals / Updated goals:  Updated Goals to be accomplished in 10 treatments:                       4 AYPRMPH will have FOTO improved to 51 to show increase tolerance to ADLS and activities                        PN 41                        CURENT:   will assess at last visit 4/4/2018                       2 patient will report overall 50% improvement to aid with increase tolerance to household chores                       PN 25%                        CURRENT:  not met, 30% improvement 4/4/18                        3 patient will ambulate 300 feet with no increase pain for carryover to community ambulation and household activities                       YA Not assessed                        CURRENT: not met, ambulated 130 feet x 2 (with sitting rest break in-between) with SPC device on level surfaces with     SBA level of assist 4/4/2018    PLAN  [x]  Upgrade activities as tolerated     [x]  Continue plan of care  [x]  Update interventions per flow sheet       []  Discharge due to:_  []  Other:_      Marquis Mancilla PT 4/4/2018  10:48 AM    Future Appointments  Date Time Provider Nori Grande   4/4/2018 10:30 AM Marquis Mancilla, JOHANA [facility] [address]   4/9/2018 10:00 AM Marquis Mancilla, PT [facility] [address] [facility]   4/23/2018 1:00 PM Rosalind Motley MD [facility] [address]   5/31/2018 10:30 AM Shameka Finn MD [address]   7/16/2018 10:20 AM Radha Vasquez MD [address] [place] [address]

## 2018-04-09 ENCOUNTER — HOSPITAL ENCOUNTER (OUTPATIENT)
Dept: PHYSICAL THERAPY | Age: 83
Discharge: HOME | End: 2018-04-09
Payer: MEDICARE

## 2018-04-09 PROCEDURE — G8980 MOBILITY D/C STATUS: HCPCS

## 2018-04-09 PROCEDURE — 97110 THERAPEUTIC EXERCISES: CPT

## 2018-04-09 PROCEDURE — G8979 MOBILITY GOAL STATUS: HCPCS

## 2018-04-09 NOTE — PROGRESS NOTES
PT DISCHARGE DAILY NOTE AND [id number]    Date:2018  Patient name: Catracho Fernandez Start of Care: 18   Referral source: Elias Gant,* : 1928   Medical/Treatment Diagnosis: Low back pain [M54.5] Onset Date:5/15/15   Prior Hospitalization: see medical history Provider#: [id number]   Medications: Verified on Patient Summary List       Comorbidities: DM, HTN, scoliosis   Prior Level of Function:I all areas, no AD, retired, household chores  Visits from [facility] Care: 16                                                                                   Missed Visits: 1  Reporting Period : 2018 to 2018    [x]  Patient  Verified  Payor: [place] MEDICARE / Plan: [place] MEDICARE PART A & B / Product Type: Medicare /    In time:10:02  Out time:10:36  Total Treatment Time (min): 34  Total Timed Codes (min): 34  1:1 Treatment Time ( [address] only): 34   Visit #: 10 of 10    SUBJECTIVE  Pain Level (0-10 scale): 0  Any medication changes, allergies to medications, adverse drug reactions, diagnosis change, or new procedure performed?: [x] No    [] Yes (see summary sheet for update)  Subjective functional status/changes:   [] No changes reported  \"I had some pain over the weekend but it was not as bad as it was. I just feel some pressure in the left buttock today\"    OBJECTIVE    30 min Therapeutic Exercise:  [x] See flow sheet : exercises, goal reassessment   Rationale: increase ROM and increase strength to improve the patients ability to tolerate functional tasks    4 min Gait Trainin feet with SPC device on level surfaces with SBA level of assist   Rationale: improve mobility and efficiency with ambulation. With   [] TE   [] TA   [] neuro   [] other: Patient Education: [x] Review HEP    [] Progressed/Changed HEP based on:   [] positioning   [] body mechanics   [] transfers   [] heat/ice application    [] other:      Other Objective/Functional Measures: See goals below.  Gait training: ambulated 260 feet with SPC device on level surfaces with SBA level of assist with \"feeling of pressure in the left buttock, not painful\". Functional Gains: overall back and knee pain has improved  Functional Deficits: still has LBP with household tasks and prolonged walking/standing  % improvement: 40%  Pain        Best: 0/10     Worst: 6/10 \"over the weekend doing housework\"    Pain Level (0-10 scale) post treatment: 0    Summary of Care:  Goal:patient will have FOTO improved to 51 to show increase tolerance to ADLS and activities  Status at last note/certification: not met, progressing, 50 points  Status at discharge: not met    Goal:patient will report overall 50% improvement to aid with increase tolerance to household chores  Status at last note/certification: not met, 40% improvement  Status at discharge: not met    Goal:patient will ambulate 300 feet with no increase pain for carryover to community ambulation and household activities  Status at last note/certification: not met, progressing, 260 feet with SPC device on level surfaces with SBA level of assist with \"feeling of pressure in the left buttock, not painful\"  Status at discharge: not met    Goal:Pt will report understanding of updated HEP to improve independent management of symptoms when d/c'ed from therapy. Status at last note/certification: MET, gave pt updated HEP and reported understanding of HEP exercises  Status at discharge: met    ASSESSMENT/Changes in Function:   Pt was seen for 16 therapy sessions. Pt demonstrated/reported improvements in overall pain since starting therapy. Pt reports 40% improvement in symptoms since start of care. Pt given updated HEP to perform. Pt is d/c'ed from therapy at this time to HEP secondary to ability to independently perform HEP to manage current deficits. G-Codes (GP)  Mobility   [id number] Goal  CK= 40-59%  D/C  CK= 40-59%    The severity rating is based on clinical judgment and the FOTO score.       Thank you for this referral!     PLAN  [x]Discontinue therapy: [x]Patient has reached or is progressing toward set goals       []Patient is non-compliant or has abdicated      []Due to lack of appreciable progress towards set goals    [employer], PT 4/9/2018  10:30 AM

## 2018-04-24 ENCOUNTER — OFFICE VISIT (OUTPATIENT)
Dept: ORTHOPEDIC SURGERY | Age: 83
End: 2018-04-24

## 2018-04-24 VITALS
HEART RATE: 59 BPM | HEIGHT: 60 IN | BODY MASS INDEX: 26.23 KG/M2 | SYSTOLIC BLOOD PRESSURE: 145 MMHG | OXYGEN SATURATION: 99 % | DIASTOLIC BLOOD PRESSURE: 66 MMHG | TEMPERATURE: 96 F | RESPIRATION RATE: 16 BRPM | WEIGHT: 133.6 LBS

## 2018-04-24 DIAGNOSIS — M54.16 LUMBAR RADICULOPATHY: Primary | ICD-10-CM

## 2018-05-03 DIAGNOSIS — I10 ESSENTIAL HYPERTENSION WITH GOAL BLOOD PRESSURE LESS THAN 140/90: ICD-10-CM

## 2018-05-08 NOTE — TELEPHONE ENCOUNTER
Patient called today wanting to find out if she could restart he Xarelto back after having stopped it on 4/28/17 due to having blood in her urine. She has an upcoming appointment with Dr. Vania Gonzalez with Urology. Told the patient she is okay to restart her Xarelto, but that if the blood in her urine returns to stop it and wait to be evaluated by Urology.   Homer Andrews MA Vidal Mtz is requesting a refill of hydrochlorothiazide for a 90 day supply and would like sent to local pharmacy, [facility].

## 2018-05-09 RX ORDER — AMLODIPINE BESYLATE 10 MG/1
TABLET ORAL
Qty: 30 TAB | Refills: 6 | Status: SHIPPED | OUTPATIENT
Start: 2018-05-09 | End: 2018-08-23 | Stop reason: SINTOL

## 2018-05-22 ENCOUNTER — OFFICE VISIT (OUTPATIENT)
Dept: PULMONOLOGY | Age: 83
End: 2018-05-22

## 2018-05-22 VITALS
RESPIRATION RATE: 16 BRPM | SYSTOLIC BLOOD PRESSURE: 126 MMHG | BODY MASS INDEX: 25.72 KG/M2 | WEIGHT: 131 LBS | HEART RATE: 84 BPM | OXYGEN SATURATION: 97 % | DIASTOLIC BLOOD PRESSURE: 56 MMHG | HEIGHT: 60 IN | TEMPERATURE: 97.9 F

## 2018-05-22 DIAGNOSIS — J98.19 PULMONARY COLLAPSE: Primary | ICD-10-CM

## 2018-05-22 NOTE — MR AVS SNAPSHOT
[address] [facility], [facility] [address] [address] 
[phone] Patient: Mil Simms MRN: [id number] Visit Information Date & Time Provider Department Dept. Phone Encounter #  
 5/22/2018 10:45 AM Kg Harper MD [facility] [place]  Follow-up Instructions Return in about 1 year (around 5/22/2019). Your Appointments 5/31/2018 10:30 AM  
PHYSICAL with Debbie Chavira MD  
[facility] [address] [place] [address] [facility] App[facility] Note: rpe  
 [address] [facility], [facility] Shay Limber [address] [place]  
  
   
 [address], [address] 7/16/2018 10:20 AM  
Follow Up with Anna Panda MD  
[facility] ([facility]) Appt Note: 6 month follow up Nicole Parks [id number] [phone] [address] Upcoming Health Maintenance Date Due  
 FOOT EXAM Q1 12/29/1938 HEMOGLOBIN A1C Q6M 2/18/2018 EYE EXAM RETINAL OR DILATED Q1 3/21/2018 Influenza Age 5 to Adult 8/1/2018 MICROALBUMIN Q1 8/31/2018 MEDICARE YEARLY EXAM 9/1/2018 LIPID PANEL Q1 1/2/2019 GLAUCOMA SCREENING Q2Y 3/21/2019 DTaP/Tdap/Td series (2 - Td) 8/31/2027 Allergies as of 5/22/2018  Review Complete On: 5/22/2018 By: Vanesa Jorgensen LPN Severity Noted Reaction Type Reactions Bactrim [Sulfamethoprim Ds] High 02/11/2013   Systemic Nausea and Vomiting Vicodin [Hydrocodone-acetaminophen] Medium 10/03/2012   Side Effect Nausea Only Atenolol    Other (comments)  
 interolance Atenolol  05/09/2017    Other (comments) Black out Codeine  05/09/2017    Other (comments)  
 headache Erythromycin    Unknown (comments) Erythromycin  05/09/2017    Hives, Itching Other Medication    Not Reported This Time Refined sugar Pcn [Penicillins]    Hives Penicillin G  05/09/2017    Hives, Itching Current Immunizations  Reviewed on 8/31/2017 Name Date Influenza High Dose Vaccine PF 8/31/2017 10:27 AM, 8/26/2016 10:11 AM, 10/12/2015 11:23 AM, 9/10/2014  3:33 PM  
 Influenza Vaccine 10/24/2013 Influenza Vaccine Split 10/28/2011 Influenza Vaccine Whole 10/25/2010 Pneumococcal Conjugate (PCV-13) 4/21/2016 10:28 AM  
 Pneumococcal Polysaccharide (PPSV-23) 4/28/2017  9:07 AM  
 TB Skin Test (PPD) 1/1/1992 Not reviewed this visit You Were Diagnosed With   
  
 Codes Comments Pulmonary collapse    -  Primary ICD-10-CM: J98.19 ICD-9-CM: 518.0 Vitals BP Pulse Temp Resp Height(growth percentile) Weight(growth percentile) 126/56 (BP 1 Location: Left arm, BP Patient Position: Sitting) 84 97.9 °F (36.6 °C) (Oral) 16 5' (1.524 m) 131 lb (59.4 kg) SpO2 BMI OB Status Smoking Status 97% 25.58 kg/m2 Hysterectomy Never Smoker Vitals History BMI and BSA Data Body Mass Index Body Surface Area 25.58 kg/m 2 1.59 m 2 Preferred Pharmacy Pharmacy Name Phone [facility], [address] [place] [address] [phone] Your Updated Medication List  
  
   
This list is accurate as of 5/22/18 11:38 AM.  Always use your most recent med list.  
  
  
  
  
 ACIDOPHILUS Cap Generic drug:  Lactobacillus acidophilus Take 2 Caps by mouth two (2) times a day. ALEVE 220 mg Cap Generic drug:  naproxen sodium Take  by mouth. amLODIPine 10 mg tablet Commonly known as:  NORVASC  
take 1 tablet by mouth once daily  
  
 ascorbic acid (vitamin C) 250 mg tablet Commonly known as:  VITAMIN C Take 1 Tab by mouth two (2) times daily (with meals). [Request refills from PCP (Dr. Adelaida Chapman)] aspirin 81 mg tablet Take 81 mg by mouth daily. carvedilol 12.5 mg tablet Commonly known as:  COREG  
take 1 tablet by mouth twice a day  
  
 cholecalciferol 1,000 unit tablet Commonly known as:  VITAMIN D3 Take 2 Tabs by mouth daily. [Request refills from PCP (Dr. Dafne Chapman)] docusate sodium 100 mg capsule Commonly known as:  Walterine Carla One capsule by mouth twice daily FISH OIL PO Take 3 Tabs by mouth daily. hydroCHLOROthiazide 12.5 mg capsule Commonly known as:  MICROZIDE  
take 1 capsule by mouth once daily  
  
 losartan 50 mg tablet Commonly known as:  COZAAR Take 1 Tab by mouth daily. lovastatin 20 mg tablet Commonly known as:  MEVACOR Take 1 Tab by mouth daily. multivitamin tablet Commonly known as:  ONE A DAY Take 1 Tab by mouth daily. nitrofurantoin (macrocrystal-monohydrate) 100 mg capsule Commonly known as:  MACROBID Take 1 Cap by mouth two (2) times daily (with meals). PriLOSEC 20 mg capsule Generic drug:  omeprazole Take 20 mg by mouth daily as needed. tolterodine 2 mg tablet Commonly known as:  Leoncio Height Take 1 Tab by mouth two (2) times a day. traMADol 50 mg tablet Commonly known as:  ULTRAM  
Take 0.5 Tabs by mouth every six (6) hours as needed for Pain. Max Daily Amount: 100 mg. Follow-up Instructions Return in about 1 year (around 5/22/2019). To-Do List   
 05/22/2018 Imaging:  XR CHEST PA LAT Patient Instructions Always call for worsening symptoms of shortness of breath Introducing [facility]! [facility] introduces [employer] patient portal. Now you can access parts of your medical record, email your doctor's office, and request medication refills online. 1. In your internet browser, go to [email or web address]. "[employer]. [employer] [place] 2. Click on the First Time User? Click Here link in the Sign In box. You will see the New Member Sign Up page. 3. Enter your Aud[employer] Access Code exactly as it appears below. You will not need to use this code after youve completed the sign-up process. If you do not sign up before the expiration date, you must request a new code. · Auditude Access Code: [email or web address] Expires: 6/4/2018  2:00 PM 
 
4. Enter the last four digits of your Social Security Number (xxxx) and Date of Birth (mm/dd/yyyy) as indicated and click Submit. You will be taken to the next sign-up page. 5. Create a Auditude ID. This will be your Auditude login ID and cannot be changed, so think of one that is secure and easy to remember. 6. Create a Aud[employer] password. You can change your password at any time. 7. Enter your Password Reset Question and Answer. This can be used at a later time if you forget your password. 8. Enter your e-mail address. You will receive e-mail notification when new information is available in [address] 9. Click Sign Up. You can now view and download portions of your medical record. 10. Click the Download Summary menu link to download a portable copy of your medical information. If you have questions, please visit the Frequently Asked Questions section of the [employer] website. Remember, Aud[employer] is NOT to be used for urgent needs. For medical emergencies, dial 911. Now available from your iPhone and Android! Please provide this summary of care documentation to your next provider. Your primary care clinician is listed as Aman Silva. Bhargav Kent. If you have any questions after today's visit, please call [phone].

## 2018-05-22 NOTE — PROGRESS NOTES
[facility]  Pulmonary, Critical Care, and Sleep Medicine      Chief complaint:  Chronic atelectasis    HPI:    Leila Álvarez    is 80years old returns the office today for follow-up concerning chronic atelectasis of her left which is been evaluated with bronchoscopies and biopsies and follow for years. The patient relates that she has a mild chronic cough which is dry and that she can hear herself wheeze at times. She also relates that she has shortness of breath with exertion but it is relatively stable and that she is still able to shop at the store on occasion if she takes her time. She denies any leg pain or swelling but has foot pain. Allergies   Allergen Reactions    Bactrim [Sulfamethoprim Ds] Nausea and Vomiting    Vicodin [Hydrocodone-Acetaminophen] Nausea Only    Atenolol Other (comments)     interolance    Atenolol Other (comments)     Black out    Codeine Other (comments)     headache    Erythromycin Unknown (comments)    Erythromycin Hives and Itching    Other Medication Not Reported This Time     Refined sugar    Pcn [Penicillins] Hives    Penicillin G Hives and Itching     Current Outpatient Prescriptions   Medication Sig    amLODIPine (NORVASC) 10 mg tablet take 1 tablet by mouth once daily    tolterodine (DETROL) 2 mg tablet Take 1 Tab by mouth two (2) times a day.  naproxen sodium (ALEVE) 220 mg cap Take  by mouth.  losartan (COZAAR) 50 mg tablet Take 1 Tab by mouth daily.  carvedilol (COREG) 12.5 mg tablet take 1 tablet by mouth twice a day    lovastatin (MEVACOR) 20 mg tablet Take 1 Tab by mouth daily.  hydroCHLOROthiazide (MICROZIDE) 12.5 mg capsule take 1 capsule by mouth once daily    multivitamin (ONE A DAY) tablet Take 1 Tab by mouth daily.  Lactobacillus acidophilus (ACIDOPHILUS) cap Take 2 Caps by mouth two (2) times a day.     docusate sodium (COLACE) 100 mg capsule One capsule by mouth twice daily    omeprazole (PRILOSEC) 20 mg capsule Take 20 mg by mouth daily as needed.  ascorbic acid (VITAMIN C) 250 mg tablet Take 1 Tab by mouth two (2) times daily (with meals). [Request refills from PCP (Dr. Benedicto Chapman)]    cholecalciferol (VITAMIN D3) 1,000 unit tablet Take 2 Tabs by mouth daily. [Request refills from PCP (Dr. Benedicto Chapman)]    aspirin 81 mg tablet Take 81 mg by mouth daily.  DOCOSAHEXANOIC ACID/EPA (FISH OIL PO) Take 3 Tabs by mouth daily.  nitrofurantoin, macrocrystal-monohydrate, (MACROBID) 100 mg capsule Take 1 Cap by mouth two (2) times daily (with meals).  traMADol (ULTRAM) 50 mg tablet Take 0.5 Tabs by mouth every six (6) hours as needed for Pain. Max Daily Amount: 100 mg. No current facility-administered medications for this visit. Past Medical History:   Diagnosis Date    Allergic rhinitis     Anemia     Asymptomatic carotid bruit     asymptomatic    Atrial fibrillation ([facility] [address] 04/2017    Started on Xarelto    Atrial fibrillation ([facility] [address]     Cardiac cath 12/15/2010    pRCA 30%. LM patent. LAD 25%. CX 30%. LVEDP 10 mmHg. EF 60%. Mild AS.  Cardiac nuclear imaging test, low to mod risk 02/22/2016    Low to intermediate risk. Sm reversible inferoapical defect may be a sm area of ischemia. No prior infarction. EF >75%. Neg EKG on pharm stress test.    Carotid duplex 02/10/2014    Mild 1-49% bilateral ICA stenosis.      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 Evette Schmid Hypertension     Lung collapse Dec 2011    Collapse of left upper lobe    Mild aortic stenosis 8/22/2011    Osteoarthritis     Osteoporosis     Positive PPD      Past Surgical History:   Procedure Laterality Date    HX CATARACT REMOVAL      HX COLONOSCOPY      HX HEART CATHETERIZATION  12/15/10    negative    HX HYSTERECTOMY      HX KNEE ARTHROSCOPY      HX PTCA  3/1995    of the LAD; has had no recurrent angina since    HX TONSILLECTOMY      MS BRONCHOSCOPY BRONCHIAL/ENDOBRNCL BX 1+ SITES  9/25/2012          Social History     Social History    Marital status:      Spouse name: N/A    Number of children: N/A    Years of education: N/A     Occupational History    Not on file.      Social History Main Topics    Smoking status: Never Smoker    Smokeless tobacco: Never Used      Comment: extensive secondhand smoke exposure through job    Alcohol use No    Drug use: No    Sexual activity: No     Other Topics Concern    Not on file     Social History Narrative    ** Merged History Encounter **          Family History   Problem Relation Age of Onset    Heart Disease Mother      History of CHF    Heart Disease Father     Heart Attack Father     Heart Disease Sister     Cancer Brother      non-Hodgkin lymphoma    Cancer Brother      liver cancer    Heart Disease Brother     Stroke Brother     Parkinsonism Sister     Parkinsonism Brother        Review of systems:  She denies fever chills poor appetite or weight loss    Physical Exam:  /56   P 84   RR 16    T 97.9  Wt: 131     O2 sat: 97%  Well-developed and elderly  HEENT: WNL  Lymph node exam: Supraclavicular cervical lymph nodes negative  Chest: Equal symmetrical expansion no dullness no wheezes rales or rubs with the exception of an inspiratory wheeze in the left chest anterior  Heart: Irregular rhythm no gallop no murmur no JVD no peripheral edema  Extremities: No cyanosis clubbing or calf tenderness      Labs:  O2 sat 97% room air at rest and with walking about 100 feet O2 sat remained 95%  Chest x-ray 5/22/18: Personally reviewed, persistent atelectasis left lung with mediastinal structures moved to the left and hilum upward abutting the aortic with an elevated left diaphragm essentially unchanged in appearance from chest x-ray 5/22/17    Impression:   By history physical exam and chest x-ray appearance persistent atelectasis of the left lung with stable shortness of breath with exertion adequate oxygenation      Plan:     Continue to monitor with a follow-up chest x-ray and follow-up visit 1 year or sooner if needed    Mirna Mitchell MD , [facility]    CC: Fam Menjivar MD     [address] [place] [address]. [address]  [place], [address] [address]     P: [phone]     F: [phone]

## 2018-05-22 NOTE — PROGRESS NOTES
Chief Complaint   Patient presents with    Shortness of Breath    Asthma     1. Have you been to the ER, urgent care clinic since your last visit? Hospitalized since your last visit? Yes Where: [facility]    2. Have you seen or consulted any other health care providers outside of the [facility] since your last visit? Include any pap smears or colon screening.  No

## 2018-05-31 ENCOUNTER — OFFICE VISIT (OUTPATIENT)
Dept: INTERNAL MEDICINE CLINIC | Age: 83
End: 2018-05-31

## 2018-05-31 VITALS
HEIGHT: 60 IN | OXYGEN SATURATION: 97 % | HEART RATE: 79 BPM | WEIGHT: 131.8 LBS | DIASTOLIC BLOOD PRESSURE: 76 MMHG | RESPIRATION RATE: 14 BRPM | SYSTOLIC BLOOD PRESSURE: 132 MMHG | TEMPERATURE: 98.6 F | BODY MASS INDEX: 25.87 KG/M2

## 2018-05-31 DIAGNOSIS — I35.0 MILD AORTIC STENOSIS: Chronic | ICD-10-CM

## 2018-05-31 DIAGNOSIS — I25.10 ATHEROSCLEROSIS OF NATIVE CORONARY ARTERY OF NATIVE HEART WITHOUT ANGINA PECTORIS: Primary | Chronic | ICD-10-CM

## 2018-05-31 DIAGNOSIS — I48.0 PAF (PAROXYSMAL ATRIAL FIBRILLATION) (HCC): ICD-10-CM

## 2018-05-31 DIAGNOSIS — I10 ESSENTIAL HYPERTENSION WITH GOAL BLOOD PRESSURE LESS THAN 140/90: ICD-10-CM

## 2018-05-31 RX ORDER — CEFUROXIME AXETIL 250 MG/1
250 TABLET ORAL 2 TIMES DAILY
COMMUNITY
End: 2018-06-29 | Stop reason: ALTCHOICE

## 2018-05-31 RX ORDER — LOSARTAN POTASSIUM 50 MG/1
50 TABLET ORAL DAILY
Qty: 90 TAB | Refills: 3 | Status: SHIPPED | OUTPATIENT
Start: 2018-05-31 | End: 2020-06-27

## 2018-05-31 NOTE — PROGRESS NOTES
Mag Hameed,born 1928, is a 80 y.o. female, who is seen today for reevaluation of atrial fibrillation illness chronic heart disease hypertension osteoporosis DJD. Her   2 weeks ago. He had passed out and was kept overnight in the hospital and apparently nothing serious was found to be wrong with him but the patient says he was never really quite right after that set around a lot read a lot and then went out to his car and was found dead in his car a short time later. He was cremated and she will be going to [place] in a few days for burial.  She feels she will get through this okay, she is sad after 70 years of marriage suddenly lose her . She does not feel depressed and so she would not consider suicide. She is breathing well and having no chest pain. She takes all her medicine correctly. She was supposed to be getting lab tests for this visit but with her 's death everything was put on hold. Past Medical History:   Diagnosis Date    Allergic rhinitis     Anemia     Asymptomatic carotid bruit     asymptomatic    Atrial fibrillation ([facility] 2017    Started on Xarelto    Atrial fibrillation ([address]     Cardiac cath 12/15/2010    pRCA 30%. LM patent. LAD 25%. CX 30%. LVEDP 10 mmHg. EF 60%. Mild AS.  Cardiac nuclear imaging test, low to mod risk 2016    Low to intermediate risk. Sm reversible inferoapical defect may be a sm area of ischemia. No prior infarction. EF >75%. Neg EKG on pharm stress test.    Carotid duplex 02/10/2014    Mild 1-49% bilateral ICA stenosis.      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 Ridge Galaviz Hypertension     Lung collapse Dec 2011    Collapse of left upper lobe    Mild aortic stenosis 8/22/2011    Osteoarthritis     Osteoporosis     Positive PPD      Current Outpatient Prescriptions   Medication Sig Dispense Refill    cefUROXime (CEFTIN) 250 mg tablet Take 250 mg by mouth two (2) times a day.  losartan (COZAAR) 50 mg tablet Take 1 Tab by mouth daily. 90 Tab 3    amLODIPine (NORVASC) 10 mg tablet take 1 tablet by mouth once daily 30 Tab 6    tolterodine (DETROL) 2 mg tablet Take 1 Tab by mouth two (2) times a day. 180 Tab 3    naproxen sodium (ALEVE) 220 mg cap Take  by mouth.  carvedilol (COREG) 12.5 mg tablet take 1 tablet by mouth twice a day 180 Tab 3    lovastatin (MEVACOR) 20 mg tablet Take 1 Tab by mouth daily. 90 Tab 3    hydroCHLOROthiazide (MICROZIDE) 12.5 mg capsule take 1 capsule by mouth once daily 90 Cap 3    multivitamin (ONE A DAY) tablet Take 1 Tab by mouth daily.  Lactobacillus acidophilus (ACIDOPHILUS) cap Take 2 Caps by mouth two (2) times a day.  docusate sodium (COLACE) 100 mg capsule One capsule by mouth twice daily 60 Cap 3    omeprazole (PRILOSEC) 20 mg capsule Take 20 mg by mouth daily as needed.  ascorbic acid (VITAMIN C) 250 mg tablet Take 1 Tab by mouth two (2) times daily (with meals). [Request refills from PCP (Dr. Clifford Chapman)] 30 Tab 0    cholecalciferol (VITAMIN D3) 1,000 unit tablet Take 2 Tabs by mouth daily. [Request refills from PCP (Dr. Clifford Chapman)] 30 Tab 0    aspirin 81 mg tablet Take 81 mg by mouth daily.  DOCOSAHEXANOIC ACID/EPA (FISH OIL PO) Take 3 Tabs by mouth daily. Visit Vitals    /76 (BP 1 Location: Left arm, BP Patient Position: Sitting)    Pulse 79    Temp 98.6 °F (37 °C) (Oral)    Resp 14    Ht 5' (1.524 m)    Wt 131 lb 12.8 oz (59.8 kg)    SpO2 97%    BMI 25.74 kg/m2     Carotids are 2+ without bruits. No JVD or HJR. Lungs are clear to percussion. Good breath sounds with no wheezing or crackles. Heart reveals an irregularly irregular rhythm with no murmur gallop click or rub. Apical impulse is not palpable. Abdomen is soft and nontender with no hepatosplenomegaly or masses and no bruits. Extremities reveal no clubbing cyanosis or edema. Pulses are 1+ in the feet and 2+ elsewhere. Assessment: #1. ASHD asymptomatic. She will continue aspirin 81 mg daily and carvedilol 12.5 mg twice a day. #2. Hypertension is well controlled. She will continue amlodipine 10 mg daily, losartan 50 mg daily and hydrochlorothiazide 12.5 mg daily. #3. Hyperlipidemia has been doing well, she will continue lovastatin 20 mg each evening and low-fat diet. #4.  DJD stable, she will use Aleve when needed. #5.  Osteoporosis, she will continue weightbearing activities and vitamin D 2000 units daily and adequate calcium in the diet. #6.  Her problem list includes diabetes, I went back several years and find no evidence of diabetes. I will delete that from her problem list.    Follow-up in mid August for complete evaluation or sooner if needed. I have asked her to call me if she needs anything from me. Barry Tapia MD FACP    Please note: This document has been produced using voice recognition software. Unrecognized errors in transcription may be present.

## 2018-05-31 NOTE — MR AVS SNAPSHOT
[facility] 
 
 
 [address], [facility] 200 [facility] 
[phone] Patient: Catracho Fernandez MRN: [id number]:[id number] Visit Information Date & Time Provider Department Dept. Phone Encounter #  
 5/31/2018 10:30 AM Charity Izaguirre MD [facility] [phone] [id number] Follow-up Instructions Follow-up and Disposition History Your Appointments 7/16/2018 10:20 AM  
Follow Up with Kenna Padgett MD  
[facility] ([address]) Appt Note: 6 month follow up [facility] [address] [address] [address] [phone] [place] [address]  
  
    
 8/9/2018 10:05 AM  
LAB with [facility] NURSE VISIT [facility] ([address]) Appt Note: lab  
 [address], [address] [facility] [address] [place]  
  
   
 [address], [address]  
  
    
 8/23/2018 10:30 AM  
PHYSICAL with Charity Izaguirre MD  
[facility] [address] [place] [address]) Appt Note: rpe  
 [address] [facility] [address] [address] [place]  
  
   
 [address], [address] Upcoming Health Maintenance Date Due  
 FOOT EXAM Q1 12/29/1938 HEMOGLOBIN A1C Q6M 2/18/2018 EYE EXAM RETINAL OR DILATED Q1 3/21/2018 Influenza Age 5 to Adult 8/1/2018 MICROALBUMIN Q1 8/31/2018 MEDICARE YEARLY EXAM 9/1/2018 LIPID PANEL Q1 1/2/2019 GLAUCOMA SCREENING Q2Y 3/21/2019 DTaP/Tdap/Td series (2 - Td) 8/31/2027 Allergies as of 5/31/2018  Review Complete On: 5/31/2018 By: Charity Izaguirre MD  
  
 Severity Noted Reaction Type Reactions Bactrim [Sulfamethoprim Ds] High 02/11/2013   Systemic Nausea and Vomiting Vicodin [Hydrocodone-acetaminophen] Medium 10/03/2012   Side Effect Nausea Only Atenolol    Other (comments) interolance Atenolol  05/09/2017    Other (comments) Black out Codeine  05/09/2017    Other (comments)  
 headache Erythromycin    Unknown (comments) Erythromycin  05/09/2017    Hives, Itching Other Medication    Not Reported This Time Refined sugar Pcn [Penicillins]    Hives Penicillin G  05/09/2017    Hives, Itching Current Immunizations  Reviewed on 8/31/2017 Name Date Influenza High Dose Vaccine PF 8/31/2017 10:27 AM, 8/26/2016 10:11 AM, 10/12/2015 11:23 AM, 9/10/2014  3:33 PM  
 Influenza Vaccine 10/24/2013 Influenza Vaccine Split 10/28/2011 Influenza Vaccine Whole 10/25/2010 Pneumococcal Conjugate (PCV-13) 4/21/2016 10:28 AM  
 Pneumococcal Polysaccharide (PPSV-23) 4/28/2017  9:07 AM  
 TB Skin Test (PPD) 1/1/1992 Not reviewed this visit You Were Diagnosed With   
  
 Codes Comments Atherosclerosis of native coronary artery of native heart without angina pectoris    -  Primary ICD-10-CM: I25.10 ICD-9-CM: 414.01 Mild aortic stenosis     ICD-10-CM: I35.0 ICD-9-CM: 424.1 Essential hypertension with goal blood pressure less than 140/90     ICD-10-CM: I10 
ICD-9-CM: 401.9 PAF (paroxysmal atrial fibrillation) (HCC)     ICD-10-CM: I48.0 ICD-9-CM: 427.31 Vitals BP Pulse Temp Resp Height(growth percentile) Weight(growth percentile) 132/76 (BP 1 Location: Left arm, BP Patient Position: Sitting) 79 98.6 °F (37 °C) (Oral) 14 5' (1.524 m) 131 lb 12.8 oz (59.8 kg) SpO2 BMI OB Status Smoking Status 97% 25.74 kg/m2 Hysterectomy Never Smoker Vitals History BMI and BSA Data Body Mass Index Body Surface Area 25.74 kg/m 2 1.59 m 2 Preferred Pharmacy Pharmacy Name Phone [facility] [address] [facility], [facility] [phone] Your Updated Medication List  
  
   
This list is accurate as of 5/31/18 11:39 AM.  Always use your most recent med list.  
  
  
  
  
 ACIDOPHILUS Cap Generic drug:  Lactobacillus acidophilus Take 2 Caps by mouth two (2) times a day. ALEVE 220 mg Cap Generic drug:  naproxen sodium Take  by mouth. amLODIPine 10 mg tablet Commonly known as:  NORVASC  
take 1 tablet by mouth once daily  
  
 ascorbic acid (vitamin C) 250 mg tablet Commonly known as:  VITAMIN C Take 1 Tab by mouth two (2) times daily (with meals). [Request refills from PCP (Dr. Morena Chapman)] aspirin 81 mg tablet Take 81 mg by mouth daily. carvedilol 12.5 mg tablet Commonly known as:  COREG  
take 1 tablet by mouth twice a day  
  
 cefUROXime 250 mg tablet Commonly known as:  CEFTIN Take 250 mg by mouth two (2) times a day. cholecalciferol 1,000 unit tablet Commonly known as:  VITAMIN D3 Take 2 Tabs by mouth daily. [Request refills from PCP (Dr. Morena Chapman)] docusate sodium 100 mg capsule Commonly known as:  Parnell Breech One capsule by mouth twice daily FISH OIL PO Take 3 Tabs by mouth daily. hydroCHLOROthiazide 12.5 mg capsule Commonly known as:  MICROZIDE  
take 1 capsule by mouth once daily  
  
 losartan 50 mg tablet Commonly known as:  COZAAR Take 1 Tab by mouth daily. lovastatin 20 mg tablet Commonly known as:  MEVACOR Take 1 Tab by mouth daily. multivitamin tablet Commonly known as:  ONE A DAY Take 1 Tab by mouth daily. PriLOSEC 20 mg capsule Generic drug:  omeprazole Take 20 mg by mouth daily as needed. tolterodine 2 mg tablet Commonly known as:  Debbie Barges Take 1 Tab by mouth two (2) times a day. Prescriptions Sent to Pharmacy Refills  
 losartan (COZAAR) 50 mg tablet 3 Sig: Take 1 Tab by mouth daily. Class: Normal  
 Pharmacy: [id number] [address] [place] [address], 9 [facility] Ph #: [phone] Route: Oral  
  
To-Do List   
 Around 08/31/2018   Lab:  CBC WITH AUTOMATED DIFF   
  
 Around 08/31/2018 Lab:  LIPID PANEL Around 08/31/2018 Lab:  METABOLIC PANEL, COMPREHENSIVE Around 08/31/2018 Lab:  T4, FREE Around 08/31/2018 Lab:  TSH 3RD GENERATION Introducing [facility] SERVICES! Priyanka Tariq introduces [employer] patient portal. Now you can access parts of your medical record, email your doctor's office, and request medication refills online. 1. In your internet browser, go to [email or web address] 2. Click on the First Time User? Click Here link in the Sign In box. You will see the New Member Sign Up page. 3. Enter your Conf[employer]x Access Code exactly as it appears below. You will not need to use this code after youve completed the sign-up process. If you do not sign up before the expiration date, you must request a new code. · Conf[employer] Access Code: [email or web address] Expires: 6/4/2018  2:00 PM 
 
4. Enter the last four digits of your Social Security Number (xxxx) and Date of Birth (mm/dd/yyyy) as indicated and click Submit. You will be taken to the next sign-up page. 5. Create a Confidex ID. This will be your Confidex login ID and cannot be changed, so think of one that is secure and easy to remember. 6. Create a Conf[employer]x password. You can change your password at any time. 7. Enter your Password Reset Question and Answer. This can be used at a later time if you forget your password. 8. Enter your e-mail address. You will receive e-mail notification when new information is available in [address] 9. Click Sign Up. You can now view and download portions of your medical record. 10. Click the Download Summary menu link to download a portable copy of your medical information. If you have questions, please visit the Frequently Asked Questions section of the Conf[employer] website. Remember, Conf[employer]x is NOT to be used for urgent needs. For medical emergencies, dial 911. Now available from your iPhone and Android! Please provide this summary of care documentation to your next provider. Your primary care clinician is listed as Mohan Harrington. Lashawn Michaels. If you have any questions after today's visit, please call [phone].

## 2018-05-31 NOTE — PROGRESS NOTES
1. Have you been to the ER, urgent care clinic or hospitalized since your last visit? YES.     2. Have you seen or consulted any other health care providers outside of the [facility] since your last visit (Include any pap smears or colon screening)? NO      Do you have an Advanced Directive? NO    Would you like information on Advanced Directives?  NO

## 2018-06-19 ENCOUNTER — TELEPHONE (OUTPATIENT)
Dept: INTERNAL MEDICINE CLINIC | Age: 83
End: 2018-06-19

## 2018-06-19 NOTE — TELEPHONE ENCOUNTER
Pt calling asking RM to fill out a dmv form for pt to get a parking pass for her car. Said the one they had was in her husbands name and he has passed away so she needs to get a new one.

## 2018-06-26 ENCOUNTER — DOCUMENTATION ONLY (OUTPATIENT)
Dept: INTERNAL MEDICINE CLINIC | Age: 83
End: 2018-06-26

## 2018-06-26 NOTE — PROGRESS NOTES
PT called stating she has been depressed since her   and her daughter is concerned. I offered her an ov today but she wanted to wait till Friday. I asked her if she was OK to wait that long and she said she thinks so.  I told her if she changes her mind to call us and we can get her in earlier

## 2018-06-29 ENCOUNTER — OFFICE VISIT (OUTPATIENT)
Dept: INTERNAL MEDICINE CLINIC | Age: 83
End: 2018-06-29

## 2018-06-29 VITALS
BODY MASS INDEX: 24.62 KG/M2 | HEART RATE: 74 BPM | SYSTOLIC BLOOD PRESSURE: 132 MMHG | HEIGHT: 60 IN | RESPIRATION RATE: 12 BRPM | OXYGEN SATURATION: 96 % | TEMPERATURE: 97.6 F | WEIGHT: 125.4 LBS | DIASTOLIC BLOOD PRESSURE: 70 MMHG

## 2018-06-29 DIAGNOSIS — R35.0 URINE FREQUENCY: Primary | ICD-10-CM

## 2018-06-29 DIAGNOSIS — F32.0 MILD SINGLE CURRENT EPISODE OF MAJOR DEPRESSIVE DISORDER (HCC): ICD-10-CM

## 2018-06-29 LAB
BILIRUB UR QL STRIP: NEGATIVE
GLUCOSE UR-MCNC: NEGATIVE MG/DL
KETONES P FAST UR STRIP-MCNC: NEGATIVE MG/DL
PH UR STRIP: 7 [PH] (ref 4.6–8)
PROT UR QL STRIP: NEGATIVE
SP GR UR STRIP: 1.01 (ref 1–1.03)
UA UROBILINOGEN AMB POC: NORMAL (ref 0.2–1)
URINALYSIS CLARITY POC: NORMAL
URINALYSIS COLOR POC: YELLOW
URINE BLOOD POC: NORMAL
URINE LEUKOCYTES POC: NORMAL
URINE NITRITES POC: NEGATIVE

## 2018-06-29 RX ORDER — ESCITALOPRAM OXALATE 10 MG/1
10 TABLET ORAL DAILY
Qty: 30 TAB | Refills: 1 | Status: SHIPPED | OUTPATIENT
Start: 2018-06-29 | End: 2018-07-24

## 2018-06-29 NOTE — MR AVS SNAPSHOT
[facility] 
 
 
 [address], [facility] 706 [facility] 
[phone] Patient: Ham Childers MRN: [id number] FUP:[id number] Visit Information Date & Time Provider Department Dept. Phone Encounter #  
 6/29/2018 10:00 AM Angeli Carbone MD [facility] [phone] Follow-up Instructions Return in about 1 month (around 7/29/2018). Your Appointments 7/24/2018 11:20 AM  
Follow Up with America Santiago MD  
[facility] ([facility]) Appt Note: 6 month follow up; r/s provider out of office.   
 [facility] [address] [address] [address] [phone] [place] [phone]  
  
    
 8/9/2018 10:05 AM  
LAB with [place] [facility] NURSE VISIT [facility] Appt Note: lab  
 [address], [address] [place] [address] [address]  
  
   
 [address], [address] [place]  
  
    
 8/23/2018 10:30 AM  
PHYSICAL with Angeli Carbone MD  
[facility]) Appt Note: rpe  
 [address] [facility], [facility] [address] [address]  
  
   
 [address], [address] [place] Upcoming Health Maintenance Date Due  
 FOOT EXAM Q1 12/29/1938 HEMOGLOBIN A1C Q6M 2/18/2018 EYE EXAM RETINAL OR DILATED Q1 3/21/2018 Influenza Age 5 to Adult 8/1/2018 MICROALBUMIN Q1 8/31/2018 MEDICARE YEARLY EXAM 9/1/2018 LIPID PANEL Q1 1/2/2019 GLAUCOMA SCREENING Q2Y 3/21/2019 DTaP/Tdap/Td series (2 - Td) 8/31/2027 Allergies as of 6/29/2018  Review Complete On: 6/29/2018 By: Angeli Carbone MD  
  
 Severity Noted Reaction Type Reactions Bactrim [Sulfamethoprim Ds] High 02/11/2013   Systemic Nausea and Vomiting  Vicodin [Hydrocodone-acetaminophen] Medium 10/03/2012   Side Effect Nausea Only Atenolol    Other (comments)  
 interolance Atenolol  05/09/2017    Other (comments) Black out Codeine  05/09/2017    Other (comments)  
 headache Erythromycin    Unknown (comments) Erythromycin  05/09/2017    Hives, Itching Other Medication    Not Reported This Time Refined sugar Pcn [Penicillins]    Hives Penicillin G  05/09/2017    Hives, Itching Current Immunizations  Reviewed on 8/31/2017 Name Date Influenza High Dose Vaccine PF 8/31/2017 10:27 AM, 8/26/2016 10:11 AM, 10/12/2015 11:23 AM, 9/10/2014  3:33 PM  
 Influenza Vaccine 10/24/2013 Influenza Vaccine Split 10/28/2011 Influenza Vaccine Whole 10/25/2010 Pneumococcal Conjugate (PCV-13) 4/21/2016 10:28 AM  
 Pneumococcal Polysaccharide (PPSV-23) 4/28/2017  9:07 AM  
 TB Skin Test (PPD) 1/1/1992 Not reviewed this visit You Were Diagnosed With   
  
 Codes Comments Urine frequency    -  Primary ICD-10-CM: R35.0 ICD-9-CM: 788.41 Mild single current episode of major depressive disorder (HCC)     ICD-10-CM: F32.0 ICD-9-CM: 296.21 Vitals BP Pulse Temp Resp Height(growth percentile) Weight(growth percentile) 132/70 74 97.6 °F (36.4 °C) (Oral) 12 5' (1.524 m) 125 lb 6.4 oz (56.9 kg) SpO2 BMI OB Status Smoking Status 96% 24.49 kg/m2 Hysterectomy Never Smoker Vitals History BMI and BSA Data Body Mass Index Body Surface Area  
 24.49 kg/m 2 1.55 m 2 Preferred Pharmacy Pharmacy Name Phone [facility] [address] [facility], [address] [place] [address] [phone] Your Updated Medication List  
  
   
This list is accurate as of 6/29/18 10:54 AM.  Always use your most recent med list.  
  
  
  
  
 ACIDOPHILUS Cap Generic drug:  Lactobacillus acidophilus Take 2 Caps by mouth two (2) times a day. ALEVE 220 mg Cap Generic drug:  naproxen sodium Take  by mouth. amLODIPine 10 mg tablet Commonly known as:  NORVASC  
take 1 tablet by mouth once daily  
  
 ascorbic acid (vitamin C) 250 mg tablet Commonly known as:  VITAMIN C Take 1 Tab by mouth two (2) times daily (with meals). [Request refills from PCP (Dr. Eliseo Chapmna)] aspirin 81 mg tablet Take 81 mg by mouth daily. carvedilol 12.5 mg tablet Commonly known as:  COREG  
take 1 tablet by mouth twice a day  
  
 cholecalciferol 1,000 unit tablet Commonly known as:  VITAMIN D3 Take 2 Tabs by mouth daily. [Request refills from PCP (Dr. Eliseo Chapman)] docusate sodium 100 mg capsule Commonly known as:  Gwendel Laser One capsule by mouth twice daily FISH OIL PO Take 3 Tabs by mouth daily. hydroCHLOROthiazide 12.5 mg capsule Commonly known as:  MICROZIDE  
take 1 capsule by mouth once daily  
  
 losartan 50 mg tablet Commonly known as:  COZAAR Take 1 Tab by mouth daily. lovastatin 20 mg tablet Commonly known as:  MEVACOR Take 1 Tab by mouth daily. multivitamin tablet Commonly known as:  ONE A DAY Take 1 Tab by mouth daily. PriLOSEC 20 mg capsule Generic drug:  omeprazole Take 20 mg by mouth daily as needed. tolterodine 2 mg tablet Commonly known as:  Shlomo Cambric Take 1 Tab by mouth two (2) times a day. We Performed the Following AMB POC URINALYSIS DIP STICK AUTO W/O MICRO [58269 CPT(R)] Follow-up Instructions Return in about 1 month (around 7/29/2018). Introducing [facility] & HEALTH SERVICES! [facility] introduces [employer] patient portal. Now you can access parts of your medical record, email your doctor's office, and request medication refills online. 1. In your internet browser, go to [email or web address] 2. Click on the First Time User? Click Here link in the Sign In box. You will see the New Member Sign Up page. 3. Enter your [employer] Access Code exactly as it appears below.  You will not need to use this code after youve completed the sign-up process. If you do not sign up before the expiration date, you must request a new code. · [employer] Access Code: [email or web address] Expires: 9/27/2018 10:54 AM 
 
4. Enter the last four digits of your Social Security Number (xxxx) and Date of Birth (mm/dd/yyyy) as indicated and click Submit. You will be taken to the next sign-up page. 5. Create a [employer] ID. This will be your [employer] login ID and cannot be changed, so think of one that is secure and easy to remember. 6. Create a [employer] password. You can change your password at any time. 7. Enter your Password Reset Question and Answer. This can be used at a later time if you forget your password. 8. Enter your e-mail address. You will receive e-mail notification when new information is available in [address] 9. Click Sign Up. You can now view and download portions of your medical record. 10. Click the Download Summary menu link to download a portable copy of your medical information. If you have questions, please visit the Frequently Asked Questions section of the [employer] website. Remember, [employer] is NOT to be used for urgent needs. For medical emergencies, dial 911. Now available from your iPhone and Android! Please provide this summary of care documentation to your next provider. Your primary care clinician is listed as Lucinda White. If you have any questions after today's visit, please call [phone].

## 2018-06-29 NOTE — PROGRESS NOTES
David Hameed,born 1928, is a 80 y.o. female, who is seen today for depression. Her   about 6 weeks ago and she feels just as depressed now she did when I saw her a month ago. She does not have much of an appetite but does force herself to eat some breakfast and when takes a nap for an hour. She sleeps pretty well most nights but not so well last night. She has been on some type of antidepressant many years ago but only took one dose and decided she felt spacey and stopped using it. She does not feel suicidal.  She is here with her daughter today. For the last 24 hours she has had some cloudiness of her urine and some increased urinary frequency last night but not this morning. No dysuria. She also notes that she has been constipated the last few days and tried using MiraLAX 1 capful yesterday without any movement of her bowels. There is no abdominal pain no nausea or vomiting no blood per rectum. Past Medical History:   Diagnosis Date    Allergic rhinitis     Anemia     Asymptomatic carotid bruit     asymptomatic    Atrial fibrillation ([facility] 2017    Started on Xarelto    Atrial fibrillation ([address]     Cardiac cath 12/15/2010    pRCA 30%. LM patent. LAD 25%. CX 30%. LVEDP 10 mmHg. EF 60%. Mild AS.  Cardiac nuclear imaging test, low to mod risk 2016    Low to intermediate risk. Sm reversible inferoapical defect may be a sm area of ischemia. No prior infarction. EF >75%. Neg EKG on pharm stress test.    Carotid duplex 02/10/2014    Mild 1-49% bilateral ICA stenosis.      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 Keith Abdalla Hypertension     Lung collapse Dec 2011    Collapse of left upper lobe    Mild aortic stenosis 8/22/2011    Osteoarthritis     Osteoporosis     Positive PPD      Current Outpatient Prescriptions   Medication Sig Dispense Refill    losartan (COZAAR) 50 mg tablet Take 1 Tab by mouth daily. 90 Tab 3    amLODIPine (NORVASC) 10 mg tablet take 1 tablet by mouth once daily 30 Tab 6    tolterodine (DETROL) 2 mg tablet Take 1 Tab by mouth two (2) times a day. 180 Tab 3    naproxen sodium (ALEVE) 220 mg cap Take  by mouth.  carvedilol (COREG) 12.5 mg tablet take 1 tablet by mouth twice a day 180 Tab 3    lovastatin (MEVACOR) 20 mg tablet Take 1 Tab by mouth daily. 90 Tab 3    hydroCHLOROthiazide (MICROZIDE) 12.5 mg capsule take 1 capsule by mouth once daily 90 Cap 3    multivitamin (ONE A DAY) tablet Take 1 Tab by mouth daily.  Lactobacillus acidophilus (ACIDOPHILUS) cap Take 2 Caps by mouth two (2) times a day.  docusate sodium (COLACE) 100 mg capsule One capsule by mouth twice daily 60 Cap 3    omeprazole (PRILOSEC) 20 mg capsule Take 20 mg by mouth daily as needed.  ascorbic acid (VITAMIN C) 250 mg tablet Take 1 Tab by mouth two (2) times daily (with meals). [Request refills from PCP (Dr. Fara Chapman)] 30 Tab 0    cholecalciferol (VITAMIN D3) 1,000 unit tablet Take 2 Tabs by mouth daily. [Request refills from PCP (Dr. Fara Chapman)] 30 Tab 0    aspirin 81 mg tablet Take 81 mg by mouth daily.  DOCOSAHEXANOIC ACID/EPA (FISH OIL PO) Take 3 Tabs by mouth daily.        Visit Vitals    /70    Pulse 74    Temp 97.6 °F (36.4 °C) (Oral)    Resp 12    Ht 5' (1.524 m)    Wt 125 lb 6.4 oz (56.9 kg)    SpO2 96%    BMI 24.49 kg/m2     No CVA tenderness. Abdomen is soft and nontender with no hepatosplenomegaly or masses. No tenderness anywhere. Results for orders placed or performed in visit on 06/29/18   AMB POC URINALYSIS DIP STICK AUTO W/O MICRO   Result Value Ref Range    Color (UA POC) Yellow     Clarity (UA POC) Cloudy     Glucose (UA POC) Negative Negative    Bilirubin (UA POC) Negative Negative    Ketones (UA POC) Negative Negative    Specific gravity (UA POC) 1.010 1.001 - 1.035    Blood (UA POC) 1+ Negative    pH (UA POC) 7.0 4.6 - 8.0    Protein (UA POC) Negative Negative    Urobilinogen (UA POC) 0.2 mg/dL 0.2 - 1    Nitrites (UA POC) Negative Negative    Leukocyte esterase (UA POC) 1+ Negative     Assessment: #1. Depression 6 weeks after her 's death. No hint of improvement and she has poor appetite she is feeling somewhat weaker she is having some sleep disturbance and at this point it is necessary to start her on antidepressant and plan on keeping her on it at least 6 and probably 12 months. I discussed the reasons for using Lexapro with her, talked to her about possible side effects and anticipated benefits and her time courses. I told her how important it is to try to stick it out if she has minor side effects and she will only use a half a tablet a day for the first 4-6 days before increasing if she has no significant side effects. She will call me if she has significant side effects that persist.  She will call me if she has any concerns about medication or her depression or anything else. #2.  Cloudy urine with some increased frequency last night but minimal findings on urinalysis and no dysuria, urine probably is not infected so we will not use antibiotic at this point. She will increase fluid intake.   #3.  Constipation, I have asked her to take 5 capfuls of MiraLAX today and then if her bowels move well by tomorrow she can use 1 capful per day to keep them moving better. She is probably constipated mainly because of decreased food intake plus she did have constipation to start with. Follow-up 1 month and then will see when she needs to come back for evaluation with lab, previously scheduled for August, not sure if we will stay with that time course or not. This visit lasted 30 minutes, greater than 50% of the time spent counseling on depression and its treatment and the specifics of using Lexapro as noted above. I also counseled her on fluid intake and need for additional MiraLAX until her bowels start to move and how that works. Barry Nieto MD FACP    Please note: This document has been produced using voice recognition software. Unrecognized errors in transcription may be present.

## 2018-07-03 ENCOUNTER — HOSPITAL ENCOUNTER (OUTPATIENT)
Dept: LAB | Age: 83
Discharge: HOME | End: 2018-07-03
Payer: MEDICARE

## 2018-07-03 ENCOUNTER — OFFICE VISIT (OUTPATIENT)
Dept: INTERNAL MEDICINE CLINIC | Age: 83
End: 2018-07-03

## 2018-07-03 VITALS
OXYGEN SATURATION: 97 % | SYSTOLIC BLOOD PRESSURE: 136 MMHG | DIASTOLIC BLOOD PRESSURE: 64 MMHG | RESPIRATION RATE: 12 BRPM | WEIGHT: 123 LBS | HEART RATE: 68 BPM | BODY MASS INDEX: 24.15 KG/M2 | TEMPERATURE: 97.5 F | HEIGHT: 60 IN

## 2018-07-03 DIAGNOSIS — F32.0 MILD SINGLE CURRENT EPISODE OF MAJOR DEPRESSIVE DISORDER (HCC): ICD-10-CM

## 2018-07-03 DIAGNOSIS — R11.0 NAUSEA: Primary | ICD-10-CM

## 2018-07-03 DIAGNOSIS — I10 ESSENTIAL HYPERTENSION: Chronic | ICD-10-CM

## 2018-07-03 LAB
ALBUMIN SERPL-MCNC: 3.8 G/DL (ref 3.4–5)
ALBUMIN/GLOB SERPL: 1 {RATIO} (ref 0.8–1.7)
ALP SERPL-CCNC: 65 U/L (ref 45–117)
ALT SERPL-CCNC: 23 U/L (ref 13–56)
ANION GAP SERPL CALC-SCNC: 11 MMOL/L (ref 3–18)
AST SERPL-CCNC: 27 U/L (ref 15–37)
BASOPHILS # BLD: 0 K/UL (ref 0–0.06)
BASOPHILS NFR BLD: 1 % (ref 0–2)
BILIRUB SERPL-MCNC: 0.9 MG/DL (ref 0.2–1)
BUN SERPL-MCNC: 27 MG/DL (ref 7–18)
BUN/CREAT SERPL: 25 (ref 12–20)
CALCIUM SERPL-MCNC: 9.8 MG/DL (ref 8.5–10.1)
CHLORIDE SERPL-SCNC: 95 MMOL/L (ref 100–108)
CO2 SERPL-SCNC: 29 MMOL/L (ref 21–32)
CREAT SERPL-MCNC: 1.07 MG/DL (ref 0.6–1.3)
DIFFERENTIAL METHOD BLD: ABNORMAL
EOSINOPHIL # BLD: 0 K/UL (ref 0–0.4)
EOSINOPHIL NFR BLD: 0 % (ref 0–5)
ERYTHROCYTE [DISTWIDTH] IN BLOOD BY AUTOMATED COUNT: 12.8 % (ref 11.6–14.5)
GLOBULIN SER CALC-MCNC: 3.7 G/DL (ref 2–4)
GLUCOSE SERPL-MCNC: 129 MG/DL (ref 74–99)
HCT VFR BLD AUTO: 40.2 % (ref 35–45)
HGB BLD-MCNC: 13.6 G/DL (ref 12–16)
LYMPHOCYTES # BLD: 1.2 K/UL (ref 0.9–3.6)
LYMPHOCYTES NFR BLD: 20 % (ref 21–52)
MCH RBC QN AUTO: 32.7 PG (ref 24–34)
MCHC RBC AUTO-ENTMCNC: 33.8 G/DL (ref 31–37)
MCV RBC AUTO: 96.6 FL (ref 74–97)
MONOCYTES # BLD: 0.6 K/UL (ref 0.05–1.2)
MONOCYTES NFR BLD: 9 % (ref 3–10)
NEUTS SEG # BLD: 4.3 K/UL (ref 1.8–8)
NEUTS SEG NFR BLD: 70 % (ref 40–73)
PLATELET # BLD AUTO: 195 K/UL (ref 135–420)
PMV BLD AUTO: 10.3 FL (ref 9.2–11.8)
POTASSIUM SERPL-SCNC: 4.9 MMOL/L (ref 3.5–5.5)
PROT SERPL-MCNC: 7.5 G/DL (ref 6.4–8.2)
RBC # BLD AUTO: 4.16 M/UL (ref 4.2–5.3)
SODIUM SERPL-SCNC: 135 MMOL/L (ref 136–145)
WBC # BLD AUTO: 6.1 K/UL (ref 4.6–13.2)

## 2018-07-03 PROCEDURE — 80053 COMPREHEN METABOLIC PANEL: CPT | Performed by: INTERNAL MEDICINE

## 2018-07-03 PROCEDURE — 85025 COMPLETE CBC W/AUTO DIFF WBC: CPT | Performed by: INTERNAL MEDICINE

## 2018-07-03 RX ORDER — PROMETHAZINE HYDROCHLORIDE 25 MG/1
25 TABLET ORAL
Qty: 30 TAB | Refills: 0 | Status: SHIPPED | OUTPATIENT
Start: 2018-07-03 | End: 2018-07-17 | Stop reason: SDUPTHER

## 2018-07-03 NOTE — PROGRESS NOTES
Karley Hameed,born 12/29/1928, is a 80 y.o. female, who is seen today for worsening nausea and also depression and hypertension. She has had progressively worsening nausea for more than 2 weeks and she is sure that she had it when I saw her last week for depression and prescribed Lexapro. She is still taking a half a tablet per day and she takes at noon. She feels nauseated all day and has to force herself to eat but when she eats it does not make the nausea worse or better. No abdominal pain. No cramps. Bowels are working normally. She is quite depressed after her 's death about 7 weeks ago and that is why we added Lexapro on June 29. Past Medical History:   Diagnosis Date    Allergic rhinitis     Anemia     Asymptomatic carotid bruit     asymptomatic    Atrial fibrillation ([facility] 04/2017    Started on Xarelto    Atrial fibrillation ([facility]     Cardiac cath 12/15/2010    pRCA 30%. LM patent. LAD 25%. CX 30%. LVEDP 10 mmHg. EF 60%. Mild AS.  Cardiac nuclear imaging test, low to mod risk 02/22/2016    Low to intermediate risk. Sm reversible inferoapical defect may be a sm area of ischemia. No prior infarction. EF >75%. Neg EKG on pharm stress test.    Carotid duplex 02/10/2014    Mild 1-49% bilateral ICA stenosis.      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 Mely Santos Hypertension     Lung collapse Dec 2011    Collapse of left upper lobe    Mild aortic stenosis 8/22/2011    Osteoarthritis     Osteoporosis     Positive PPD      Current Outpatient Prescriptions   Medication Sig Dispense Refill    escitalopram oxalate (LEXAPRO) 10 mg tablet Take 1 Tab by mouth daily. 30 Tab 1    losartan (COZAAR) 50 mg tablet Take 1 Tab by mouth daily. 90 Tab 3    amLODIPine (NORVASC) 10 mg tablet take 1 tablet by mouth once daily 30 Tab 6    tolterodine (DETROL) 2 mg tablet Take 1 Tab by mouth two (2) times a day. 180 Tab 3    naproxen sodium (ALEVE) 220 mg cap Take  by mouth.  carvedilol (COREG) 12.5 mg tablet take 1 tablet by mouth twice a day 180 Tab 3    lovastatin (MEVACOR) 20 mg tablet Take 1 Tab by mouth daily. 90 Tab 3    hydroCHLOROthiazide (MICROZIDE) 12.5 mg capsule take 1 capsule by mouth once daily 90 Cap 3    multivitamin (ONE A DAY) tablet Take 1 Tab by mouth daily.  Lactobacillus acidophilus (ACIDOPHILUS) cap Take 2 Caps by mouth two (2) times a day.  docusate sodium (COLACE) 100 mg capsule One capsule by mouth twice daily 60 Cap 3    omeprazole (PRILOSEC) 20 mg capsule Take 20 mg by mouth daily as needed.  ascorbic acid (VITAMIN C) 250 mg tablet Take 1 Tab by mouth two (2) times daily (with meals). [Request refills from PCP (Dr. Rachael Chapman)] 30 Tab 0    cholecalciferol (VITAMIN D3) 1,000 unit tablet Take 2 Tabs by mouth daily. [Request refills from PCP (Dr. Rachael Chapman)] 30 Tab 0    aspirin 81 mg tablet Take 81 mg by mouth daily.  DOCOSAHEXANOIC ACID/EPA (FISH OIL PO) Take 3 Tabs by mouth daily. Visit Vitals    /64    Pulse 68    Temp 97.5 °F (36.4 °C) (Oral)    Resp 12    Ht 5' (1.524 m)    Wt 123 lb (55.8 kg)    SpO2 97%    BMI 24.02 kg/m2     Abdomen is not distended and not tympanitic. Normoactive bowel sounds. No hepatosplenomegaly or masses.   No rebound tenderness or tenderness anywhere. Assessment: Nausea probably related to depression. It does not seem to be related to recent initiation of Lexapro since it was there well before that. We will check CMP and CBC today and start her on Phenergan. Hopefully when the antidepressant starts to work the nausea will clear as well. I discussed all this at length with her and her  who is with her today. I discussed various other possibilities for why she is nauseated but that right now the most likely reason is depression. She will increase the dose of Lexapro to 10 mg daily as previously directed after using 5 mg daily for 6 days. Follow-up as previously planned in a few weeks or sooner if needed    Barry Yuan MD FACP    Please note: This document has been produced using voice recognition software. Unrecognized errors in transcription may be present. This visit lasted 25 minutes, greater than 50% of the time spent counseling on the causes for nausea and my recommendations for initial treatment and hopefully it will clear on its own. She is already on omeprazole.

## 2018-07-03 NOTE — MR AVS SNAPSHOT
[facility] 
 
 
 [address], [address] [facility] 
[phone] Patient: Salo Roach MRN: [id number]:[id number] Visit Information Date & Time Provider Department Dept. Phone Encounter #  
 7/3/2018 11:00 AM Sydney Price MD [facility] [id number] Follow-up Instructions Follow-up and Disposition History Your Appointments 7/24/2018 11:20 AM  
Follow Up with Moises Humphrey MD  
[facility] ([address] [place] [address]) Appt Note: 6 month follow up; r/s provider out of office. mg  
 [address] [id number]  
[phone] [address] [place] [address]  
  
    
 7/25/2018 10:00 AM  
Office Visit with Sydney Price MD  
[facility] [address]) Appt Note: 1 month f/u  
 [address] [facility] [address] [place]  
  
   
 [address], [address]  
  
    
 8/9/2018 10:05 AM  
LAB with [facility] NURSE VISIT [facility] ([address] [place] [address]) Appt Note: lab  
 [address], [address] [place] [address] [address] [place]  
  
   
 [address], [address]  
  
    
 8/23/2018 10:30 AM  
PHYSICAL with Sydney Price MD  
[facility] [address]) Appt Note: rpe  
 [address] [facility] [address] [place]  
  
   
 [address], [address] Upcoming Health Maintenance Date Due  
 FOOT EXAM Q1 12/29/1938 HEMOGLOBIN A1C Q6M 2/18/2018 EYE EXAM RETINAL OR DILATED Q1 3/21/2018 Influenza Age 5 to Adult 8/1/2018 MICROALBUMIN Q1 8/31/2018 MEDICARE YEARLY EXAM 9/1/2018 LIPID PANEL Q1 1/2/2019 GLAUCOMA SCREENING Q2Y 3/21/2019 DTaP/Tdap/Td series (2 - Td) 8/31/2027 Allergies as of 7/3/2018  Review Complete On: 7/3/2018 By: Marlin Ham MD  
  
 Severity Noted Reaction Type Reactions Bactrim [Sulfamethoprim Ds] High 02/11/2013   Systemic Nausea and Vomiting Vicodin [Hydrocodone-acetaminophen] Medium 10/03/2012   Side Effect Nausea Only Atenolol    Other (comments)  
 interolance Atenolol  05/09/2017    Other (comments) Black out Codeine  05/09/2017    Other (comments)  
 headache Erythromycin    Unknown (comments) Erythromycin  05/09/2017    Hives, Itching Other Medication    Not Reported This Time Refined sugar Pcn [Penicillins]    Hives Penicillin G  05/09/2017    Hives, Itching Current Immunizations  Reviewed on 8/31/2017 Name Date Influenza High Dose Vaccine PF 8/31/2017 10:27 AM, 8/26/2016 10:11 AM, 10/12/2015 11:23 AM, 9/10/2014  3:33 PM  
 Influenza Vaccine 10/24/2013 Influenza Vaccine Split 10/28/2011 Influenza Vaccine Whole 10/25/2010 Pneumococcal Conjugate (PCV-13) 4/21/2016 10:28 AM  
 Pneumococcal Polysaccharide (PPSV-23) 4/28/2017  9:07 AM  
 TB Skin Test (PPD) 1/1/1992 Not reviewed this visit You Were Diagnosed With   
  
 Codes Comments Nausea    -  Primary ICD-10-CM: R11.0 ICD-9-CM: 787.02   
 Mild single current episode of major depressive disorder ([facility]     ICD-10-CM: F32.0 ICD-9-CM: 296.21 Essential hypertension     ICD-10-CM: I10 
ICD-9-CM: 401.9 Vitals BP Pulse Temp Resp Height(growth percentile) Weight(growth percentile) 136/64 68 97.5 °F (36.4 °C) (Oral) 12 5' (1.524 m) 123 lb (55.8 kg) SpO2 BMI OB Status Smoking Status 97% 24.02 kg/m2 Hysterectomy Never Smoker Vitals History BMI and BSA Data Body Mass Index Body Surface Area 24.02 kg/m 2 1.54 m 2 Preferred Pharmacy Pharmacy Name Phone [facility] [address] [facility] [phone] Your Updated Medication List  
  
   
 This list is accurate as of 7/3/18 11:34 AM.  Always use your most recent med list.  
  
  
  
  
 ACIDOPHILUS Cap Generic drug:  Lactobacillus acidophilus Take 2 Caps by mouth two (2) times a day. ALEVE 220 mg Cap Generic drug:  naproxen sodium Take  by mouth. amLODIPine 10 mg tablet Commonly known as:  NORVASC  
take 1 tablet by mouth once daily  
  
 ascorbic acid (vitamin C) 250 mg tablet Commonly known as:  VITAMIN C Take 1 Tab by mouth two (2) times daily (with meals). [Request refills from PCP (Dr. Aidan Chapman)] aspirin 81 mg tablet Take 81 mg by mouth daily. carvedilol 12.5 mg tablet Commonly known as:  COREG  
take 1 tablet by mouth twice a day  
  
 cholecalciferol 1,000 unit tablet Commonly known as:  VITAMIN D3 Take 2 Tabs by mouth daily. [Request refills from PCP (Dr. Aidan Chapman)] docusate sodium 100 mg capsule Commonly known as:  Annice Contreras One capsule by mouth twice daily  
  
 escitalopram oxalate 10 mg tablet Commonly known as:  Arty Onset Take 1 Tab by mouth daily. FISH OIL PO Take 3 Tabs by mouth daily. hydroCHLOROthiazide 12.5 mg capsule Commonly known as:  MICROZIDE  
take 1 capsule by mouth once daily  
  
 losartan 50 mg tablet Commonly known as:  COZAAR Take 1 Tab by mouth daily. lovastatin 20 mg tablet Commonly known as:  MEVACOR Take 1 Tab by mouth daily. multivitamin tablet Commonly known as:  ONE A DAY Take 1 Tab by mouth daily. PriLOSEC 20 mg capsule Generic drug:  omeprazole Take 20 mg by mouth daily as needed. promethazine 25 mg tablet Commonly known as:  PHENERGAN Take 1 Tab by mouth every six (6) hours as needed for Nausea. tolterodine 2 mg tablet Commonly known as:  Holder Muzzy Take 1 Tab by mouth two (2) times a day. Prescriptions Sent to Pharmacy  Refills  
 promethazine (PHENERGAN) 25 mg tablet 0  
 Sig: Take 1 Tab by mouth every six (6) hours as needed for Nausea. Class: Normal  
 [facility] [address], [address]  #: [phone] Route: Oral  
  
To-Do List   
 Around 07/03/2018 Lab:  CBC WITH AUTOMATED DIFF Around 07/03/2018 Lab:  METABOLIC PANEL, COMPREHENSIVE Introducing [facility]! [employer] introduces [employer] patient portal. Now you can access parts of your medical record, email your doctor's office, and request medication refills online. 1. In your internet browser, go to [email or web address] 2. Click on the First Time User? Click Here link in the Sign In box. You will see the New Member Sign Up page. 3. Enter your [employer] Access Code exactly as it appears below. You will not need to use this code after youve completed the sign-up process. If you do not sign up before the expiration date, you must request a new code. · [employer] Access Code: [email or web address] Expires: 9/27/2018 10:54 AM 
 
4. Enter the last four digits of your Social Security Number (xxxx) and Date of Birth (mm/dd/yyyy) as indicated and click Submit. You will be taken to the next sign-up page. 5. Create a [employer] ID. This will be your [employer] login ID and cannot be changed, so think of one that is secure and easy to remember. 6. Create a [employer] password. You can change your password at any time. 7. Enter your Password Reset Question and Answer. This can be used at a later time if you forget your password. 8. Enter your e-mail address. You will receive e-mail notification when new information is available in [address] 9. Click Sign Up. You can now view and download portions of your medical record. 10. Click the Download Summary menu link to download a portable copy of your medical information.  
 
If you have questions, please visit the Frequently Asked Questions section of the [employer]. Remember, [employer] is NOT to be used for urgent needs. For medical emergencies, dial 911. Now available from your iPhone and Android! Please provide this summary of care documentation to your next provider. Your primary care clinician is listed as Morene Hodgkin. Bianca Toussaint. If you have any questions after today's visit, please call [phone].

## 2018-07-05 ENCOUNTER — TELEPHONE (OUTPATIENT)
Dept: INTERNAL MEDICINE CLINIC | Age: 83
End: 2018-07-05

## 2018-07-05 NOTE — TELEPHONE ENCOUNTER
----- Message from Matthew Hernandez MD sent at 7/5/2018  7:36 AM EDT -----  Please notify the patient that her lab work looks good  Chief Complaint   Patient presents with    Labs     done 7-03-18 per Dr Vamsi Bills     Patient reached and informed, and states she understands all.

## 2018-07-13 ENCOUNTER — TELEPHONE (OUTPATIENT)
Dept: INTERNAL MEDICINE CLINIC | Age: 83
End: 2018-07-13

## 2018-07-13 NOTE — TELEPHONE ENCOUNTER
Have you received paperwork from [facility] for FMLA for her daughter      #  [phone]  Huseyin Lennno

## 2018-07-16 RX ORDER — HYDROCHLOROTHIAZIDE 12.5 MG/1
12.5 CAPSULE ORAL DAILY
Qty: 90 CAP | Refills: 3 | Status: SHIPPED | OUTPATIENT
Start: 2018-07-16 | End: 2018-10-08

## 2018-07-16 NOTE — TELEPHONE ENCOUNTER
Last Visit: 07/03/2018 with MD Janiya Lino    Next Appointment: 07/25/2018 with MD Janiya Lino   Previous Refill Encounters: 06/19/2017 per MD Janiya Lino #[phone] with 3 refills     Requested Prescriptions     Pending Prescriptions Disp Refills    hydroCHLOROthiazide (MICROZIDE) 12.5 mg capsule 90 Cap 3     Sig: Take 1 Cap by mouth daily.

## 2018-07-17 RX ORDER — PROMETHAZINE HYDROCHLORIDE 25 MG/1
25 TABLET ORAL
Qty: 30 TAB | Refills: 0 | Status: SHIPPED | OUTPATIENT
Start: 2018-07-17

## 2018-07-17 NOTE — TELEPHONE ENCOUNTER
Last Visit: 07/03/2018 with MD Major Aguilar    Next Appointment: 07/25/2018 with MD Major Aguilar   Previous Refill Encounters: 07/03/2018 per MD Major Aguilar #[id number]    Requested Prescriptions     Pending Prescriptions Disp Refills    promethazine (PHENERGAN) 25 mg tablet 30 Tab 0     Sig: Take 1 Tab by mouth every six (6) hours as needed for Nausea.

## 2018-07-24 ENCOUNTER — OFFICE VISIT (OUTPATIENT)
Dept: CARDIOLOGY CLINIC | Age: 83
End: 2018-07-24

## 2018-07-24 VITALS
OXYGEN SATURATION: 98 % | BODY MASS INDEX: 24.35 KG/M2 | HEART RATE: 83 BPM | SYSTOLIC BLOOD PRESSURE: 100 MMHG | WEIGHT: 124 LBS | DIASTOLIC BLOOD PRESSURE: 60 MMHG | HEIGHT: 60 IN

## 2018-07-24 DIAGNOSIS — I25.10 ATHEROSCLEROSIS OF NATIVE CORONARY ARTERY OF NATIVE HEART WITHOUT ANGINA PECTORIS: Chronic | ICD-10-CM

## 2018-07-24 DIAGNOSIS — R06.09 DOE (DYSPNEA ON EXERTION): ICD-10-CM

## 2018-07-24 DIAGNOSIS — I51.9 DIASTOLIC DYSFUNCTION, LEFT VENTRICLE: ICD-10-CM

## 2018-07-24 DIAGNOSIS — I10 ESSENTIAL HYPERTENSION: Chronic | ICD-10-CM

## 2018-07-24 DIAGNOSIS — I48.20 CHRONIC ATRIAL FIBRILLATION (HCC): Primary | ICD-10-CM

## 2018-07-24 NOTE — PROGRESS NOTES
HISTORY OF PRESENT ILLNESS  Tonny Bain is a 80 y.o. female. HPI  She has been doing quite well from a cardiac point of view. She is still quite sad and grieving over her 's death. She denies any cardiac symptoms. She has had no chest pain, dyspnea, orthopnea, PND. She denies palpitations other than occasional fluttering. She denies dizziness or syncope. She denies any symptoms of TIA or amaurosis fugax. She is still quite sharp with her memory. She has a history of successful PTCA of the LAD in March 1995 and has had no recurrent angina ever since. She had a repeat cardiac catheterization in March 1995 for chest pain, which revealed no evidence of restenosis . Her noninvasive carotid studies on March 19, 2007, demonstrated 50% stenosis of the distal right internal carotid artery, which was not severe enough to warrant surgical intervention. She underwent stress nuclear cardiac imaging on May 11, 2009, which was a normal imaging, with no evidence of scarring or ischemia and no interval change in comparison to the study of March 2007.    Because of the continued chest pain despite the negative stress and EKG and cardiac imaging, she underwent the cardiac catheterization on 12/15/2010, which was reviewed.    1. Patient dominant RCA with the 30% of proximal stenosis. 2. Patent left main trunk. .  3. Patient LAD with the diffuse 20 to 30 % narrowing throughout. 4. Patient circumflex artery with diffuse 30% stenosis in the mid segment. 5. Normal left ventricular systolic function with EF in the 60% range. 6. Mild aortic stenosis with the pressure gradient of 10 to 20 mmHg on pullback. It was felt that her chest pain was noncardiac in nature, and the continued medical treatment was recommended.    [facility] was [facility] on 12/4/11 with hemoptysis. She was evaluated by endoscopy examination and found to have a hiatal hernia but no source of bleeding.  She was subsequently evaluated by pulmonology and was found to have obstructive lesion of left upper lobe. A bronchoscopy was done on 12/5/11 which demonstrated obstructive upper left lobe with hemorrhage of entire bronchial tree suggestive of hemoptysis rather than hematemesis from the GI system. 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 She has been under the care of Dr. Yogesh Fields for hemoptysis and abnormal CT scan and chest X-ray but no decision has been made thus far. She underwent endoscopic examination on 10/21/13 and was found to have gastric ulcers, which were treated with Nexium. She is now just taking Prilosec occasionally. She has had stress nuclear cardiac imaging on 2/10/14, which demonstrated normal perfusion with no evidence of scarring or ischemia. The ejection fraction was in the 80% range. She underwent noninvasive carotid study to evaluate asymptomatic carotid bruit on 2/10/14, which demonstrated bilateral 1-49% stenosis of the internal carotid arteries, which was felt to be not severe enough to cause any symptoms or warrant treatment.    She continues to be followed by a pulmonologist for some mass like lesion in the lungs by chest X-ray and CT scan, but it has been negative for malignancy thus far. She has had occasional hemoptysis and it is not clear if it ever has been related to the lung lesions. She is now under the care of Dr. Bah, as Dr. Yogesh Fields has retired.    She underwent stress nuclear cardiac imaging on 02/22/16, which demonstrated a very small focal area of mild ischemia in the inferior apex. LV function was normal with EF in the 75% range.    For the evaluation of her chest pain, she underwent stress nuclear cardiac imaging on 03/02/2017, which demonstrated normal perfusion with no evidence of scarring or ischemia.  Ejection fraction was greater than 70%.  She underwent a Holter monitor on 03/06/2017 to evaluate the heart rate response, which demonstrated baseline atrial fibrillation with a minimum heart rate of 58 and a maximum heart rate of 141 with an average heart rate of 88.  There were no significant pauses.  Her BUN/creatinine were 36 and 0.81 on 02/28/2017. Review of Systems   Constitutional: Positive for malaise/fatigue. Negative for weight loss. HENT: Negative for hearing loss. Eyes: Negative for blurred vision and double vision. Respiratory: Positive for shortness of breath. Cardiovascular: Positive for palpitations. Negative for chest pain. Gastrointestinal: Negative for blood in stool, heartburn and melena. Genitourinary: Negative for dysuria, frequency, hematuria and urgency. Musculoskeletal: Negative for back pain and joint pain. Skin: Negative for itching and rash. Neurological: Negative for dizziness, loss of consciousness and weakness. Psychiatric/Behavioral: Negative for depression and memory loss. Physical Exam   Constitutional: She is oriented to person, place, and time. She appears well-developed and well-nourished. HENT:   Head: Normocephalic and atraumatic. Eyes: Conjunctivae are normal. Pupils are equal, round, and reactive to light. Neck: Normal range of motion. Neck supple. No JVD present. Cardiovascular: Normal rate, S1 normal and S2 normal.  An irregularly irregular rhythm present. No extrasystoles are present. PMI is not displaced. Exam reveals no gallop and no friction rub. Murmur heard. Harsh early systolic murmur is present with a grade of 1/6  at the upper right sternal border radiating to the neck  Pulses:       Carotid pulses are 3+ on the right side with bruit, and 3+ on the left side with bruit. Pulmonary/Chest: Effort normal. She has no rales. Abdominal: Soft. There is no tenderness. Musculoskeletal: She exhibits no edema. Neurological: She is alert and oriented to person, place, and time. No cranial nerve deficit.    Skin: Skin is warm and dry. Psychiatric: She has a normal mood and affect. Her behavior is normal.     Visit Vitals    /60    Pulse 83    Ht 5' (1.524 m)    Wt 56.2 kg (124 lb)    SpO2 98%    BMI 24.22 kg/m2       Past Medical History:   Diagnosis Date    Allergic rhinitis     Anemia     Asymptomatic carotid bruit     asymptomatic    Atrial fibrillation ([facility] 04/2017    Started on Xarelto    Atrial fibrillation ([facility]     Cardiac cath 12/15/2010    pRCA 30%. LM patent. LAD 25%. CX 30%. LVEDP 10 mmHg. EF 60%. Mild AS.  Cardiac nuclear imaging test, low to mod risk 02/22/2016    Low to intermediate risk. Sm reversible inferoapical defect may be a sm area of ischemia. No prior infarction. EF >75%. Neg EKG on pharm stress test.    Carotid duplex 02/10/2014    Mild 1-49% bilateral ICA stenosis.  Chronic anemia     Chronic kidney disease     CKD (chronic kidney disease) stage 2, GFR 60-89 ml/min     Closed bilateral fracture of pubic rami (HCC) 5/15/2014    Acute pubic bone fracture on both sides of the symphysis pubis    Coronary artery disease     Status post PTCA of the LAD in March 1995/ EF 70%    Coronary artery disease     Diabetes mellitus ([facility]     has had elevated BS from time to time    Dyslipidemia     Dysphagia     Gastroesophageal reflux disease     Gross hematuria     Heart problem     Histoplasmosis Oct 2012    With probable bronchiectasis and localized AV malformations at left upper lobe with recurrent hemoptysis    History of peptic ulcer     Dr. Marie Deeds Hypertension     Lung collapse Dec 2011    Collapse of left upper lobe    Mild aortic stenosis 8/22/2011    Osteoarthritis     Osteoporosis     Positive PPD        Social History     Social History    Marital status:      Spouse name: N/A    Number of children: N/A    Years of education: N/A     Occupational History    Not on file.      Social History Main Topics    Smoking status: Never Smoker    Smokeless tobacco: Never Used      Comment: extensive secondhand smoke exposure through job    Alcohol use No    Drug use: No    Sexual activity: No     Other Topics Concern    Not on file     Social History Narrative    ** Merged History Encounter **            Family History   Problem Relation Age of Onset    Heart Disease Mother      History of CHF    Heart Disease Father     Heart Attack Father     Heart Disease Sister     Cancer Brother      non-Hodgkin lymphoma    Cancer Brother      liver cancer    Heart Disease Brother     Stroke Brother     Parkinsonism Sister     Parkinsonism Brother        Past Surgical History:   Procedure Laterality Date    HX CATARACT REMOVAL      HX COLONOSCOPY      HX HEART CATHETERIZATION  12/15/10    negative    HX HYSTERECTOMY      HX KNEE ARTHROSCOPY      HX PTCA  3/1995    of the LAD; has had no recurrent angina since    HX TONSILLECTOMY      IL BRONCHOSCOPY BRONCHIAL/ENDOBRNCL BX 1+ SITES  9/25/2012            Current Outpatient Prescriptions   Medication Sig Dispense Refill    promethazine (PHENERGAN) 25 mg tablet Take 1 Tab by mouth every six (6) hours as needed for Nausea. 30 Tab 0    hydroCHLOROthiazide (MICROZIDE) 12.5 mg capsule Take 1 Cap by mouth daily. 90 Cap 3    losartan (COZAAR) 50 mg tablet Take 1 Tab by mouth daily. 90 Tab 3    amLODIPine (NORVASC) 10 mg tablet take 1 tablet by mouth once daily 30 Tab 6    tolterodine (DETROL) 2 mg tablet Take 1 Tab by mouth two (2) times a day. 180 Tab 3    naproxen sodium (ALEVE) 220 mg cap Take  by mouth.  carvedilol (COREG) 12.5 mg tablet take 1 tablet by mouth twice a day 180 Tab 3    lovastatin (MEVACOR) 20 mg tablet Take 1 Tab by mouth daily. 90 Tab 3    multivitamin (ONE A DAY) tablet Take 1 Tab by mouth daily.  Lactobacillus acidophilus (ACIDOPHILUS) cap Take 2 Caps by mouth two (2) times a day.       docusate sodium (COLACE) 100 mg capsule One capsule by mouth twice daily 60 Cap 3    omeprazole (PRILOSEC) 20 mg capsule Take 20 mg by mouth daily as needed.  ascorbic acid (VITAMIN C) 250 mg tablet Take 1 Tab by mouth two (2) times daily (with meals). [Request refills from PCP (Dr. Valentina Chapman)] 30 Tab 0    cholecalciferol (VITAMIN D3) 1,000 unit tablet Take 2 Tabs by mouth daily. [Request refills from PCP (Dr. Valentina Chapman)] 30 Tab 0    aspirin 81 mg tablet Take 81 mg by mouth daily.  DOCOSAHEXANOIC ACID/EPA (FISH OIL PO) Take 3 Tabs by mouth daily. EKG: unchanged from previous tracings, atrial fibrillation, rate controlled  . ASSESSMENT and PLAN  Encounter Diagnoses   Name Primary?  Chronic atrial fibrillation (HCC) Yes    WILSON (dyspnea on exertion)     Atherosclerosis of native coronary artery of native heart without angina pectoris,status post PTCA of LAD in March 1995/EF 70%.  Essential hypertension     Diastolic dysfunction, left ventricle    She has been doing quite well from a cardiac standpoint. She remains to be quite intelligent at her age of 80. She has had no symptoms to indicate angina or cardiac decompensation. Her atrial fibrillation has been stable with good rate control and she has been essentially asymptomatic from the atrial fibrillation. From a cardiac standpoint, will be continued on current regimen.

## 2018-07-24 NOTE — MR AVS SNAPSHOT
[facility] [address] [address] [address] [address] [phone] Patient: Dane Roth MRN: [id number] Visit Information Date & Time Provider Department Dept. Phone Encounter #  
 7/24/2018 11:20 AM Ash Baker MD [facility] [id number] Your Appointments 7/25/2018 10:00 AM  
Office Visit with Prachi Page MD  
[facility] [address]) Appt Note: 1 month f/u  
 [address] [facility], Suite [facility] [address] [address]  
  
   
 [address], [address]  
  
    
 8/9/2018 10:05 AM  
LAB with [facility] NURSE VISIT [facility] ([address]) Appt Note: lab  
 [address], [address] [facility] [address]  
  
   
 [address], [address]  
  
    
 8/23/2018 10:30 AM  
PHYSICAL with Prachi Page MD  
[facility] [address] [place] [address]) Appt Note: rpe  
 [address] [facility], [facility] [address] [address]  
  
   
 [address], [address] Upcoming Health Maintenance Date Due  
 FOOT EXAM Q1 12/29/1938 HEMOGLOBIN A1C Q6M 2/18/2018 EYE EXAM RETINAL OR DILATED Q1 3/21/2018 Influenza Age 5 to Adult 8/1/2018 MICROALBUMIN Q1 8/31/2018 MEDICARE YEARLY EXAM 9/1/2018 LIPID PANEL Q1 1/2/2019 GLAUCOMA SCREENING Q2Y 3/21/2019 DTaP/Tdap/Td series (2 - Td) 8/31/2027 Allergies as of 7/24/2018  Review Complete On: 7/24/2018 By: Ash Baker MD  
  
 Severity Noted Reaction Type Reactions Bactrim [Sulfamethoprim Ds] High 02/11/2013   Systemic Nausea and Vomiting Vicodin [Hydrocodone-acetaminophen] Medium 10/03/2012   Side Effect Nausea Only Atenolol    Other (comments)  
 interolance Atenolol  05/09/2017    Other (comments) Black out Codeine  05/09/2017    Other (comments)  
 headache Erythromycin    Unknown (comments) Erythromycin  05/09/2017    Hives, Itching Other Medication    Not Reported This Time Refined sugar Pcn [Penicillins]    Hives Penicillin G  05/09/2017    Hives, Itching Current Immunizations  Reviewed on 8/31/2017 Name Date Influenza High Dose Vaccine PF 8/31/2017 10:27 AM, 8/26/2016 10:11 AM, 10/12/2015 11:23 AM, 9/10/2014  3:33 PM  
 Influenza Vaccine 10/24/2013 Influenza Vaccine Split 10/28/2011 Influenza Vaccine Whole 10/25/2010 Pneumococcal Conjugate (PCV-13) 4/21/2016 10:28 AM  
 Pneumococcal Polysaccharide (PPSV-23) 4/28/2017  9:07 AM  
 TB Skin Test (PPD) 1/1/1992 Not reviewed this visit You Were Diagnosed With   
  
 Codes Comments Chronic atrial fibrillation (HCC)    -  Primary ICD-10-CM: K86.8 ICD-9-CM: 427.31   
 WILSON (dyspnea on exertion)     ICD-10-CM: R06.09 
ICD-9-CM: 786.09 Atherosclerosis of native coronary artery of native heart without angina pectoris     ICD-10-CM: I25.10 ICD-9-CM: 414.01 Essential hypertension     ICD-10-CM: I10 
ICD-9-CM: 401.9 Diastolic dysfunction, left ventricle     ICD-10-CM: I51.9 ICD-9-CM: 429.9 Vitals BP Pulse Height(growth percentile) Weight(growth percentile) SpO2 BMI  
 100/60 83 5' (1.524 m) 124 lb (56.2 kg) 98% 24.22 kg/m2 OB Status Smoking Status Hysterectomy Never Smoker Vitals History BMI and BSA Data Body Mass Index Body Surface Area  
 24.22 kg/m 2 1.54 m 2 Preferred Pharmacy Pharmacy Name Phone [facility] [address] [facility], [facility] [phone] Your Updated Medication List  
  
   
This list is accurate as of 7/24/18 12:11 PM.  Always use your most recent med list.  
  
  
  
  
 ACIDOPHILUS Cap Generic drug:  Lactobacillus acidophilus Take 2 Caps by mouth two (2) times a day. ALEVE 220 mg Cap Generic drug:  naproxen sodium Take  by mouth. amLODIPine 10 mg tablet Commonly known as:  NORVASC  
take 1 tablet by mouth once daily  
  
 ascorbic acid (vitamin C) 250 mg tablet Commonly known as:  VITAMIN C Take 1 Tab by mouth two (2) times daily (with meals). [Request refills from PCP (Dr. Freddy Chapman)] aspirin 81 mg tablet Take 81 mg by mouth daily. carvedilol 12.5 mg tablet Commonly known as:  COREG  
take 1 tablet by mouth twice a day  
  
 cholecalciferol 1,000 unit tablet Commonly known as:  VITAMIN D3 Take 2 Tabs by mouth daily. [Request refills from PCP (Dr. Freddy Chapman)] docusate sodium 100 mg capsule Commonly known as:  Mary Mule One capsule by mouth twice daily FISH OIL PO Take 3 Tabs by mouth daily. hydroCHLOROthiazide 12.5 mg capsule Commonly known as:  Edin Papito Take 1 Cap by mouth daily. losartan 50 mg tablet Commonly known as:  COZAAR Take 1 Tab by mouth daily. lovastatin 20 mg tablet Commonly known as:  MEVACOR Take 1 Tab by mouth daily. multivitamin tablet Commonly known as:  ONE A DAY Take 1 Tab by mouth daily. PriLOSEC 20 mg capsule Generic drug:  omeprazole Take 20 mg by mouth daily as needed. promethazine 25 mg tablet Commonly known as:  PHENERGAN Take 1 Tab by mouth every six (6) hours as needed for Nausea. tolterodine 2 mg tablet Commonly known as:  [place] Sandifer Take 1 Tab by mouth two (2) times a day. We Performed the Following AMB POC EKG ROUTINE W/ 12 LEADS, INTER & REP [96711 CPT(R)] Introducing [facility]! [facility] introduces [employer] patient portal. Now you can access parts of your medical record, email your doctor's office, and request medication refills online. 1. In your internet browser, go to [email or web address] 2. Click on the First Time User? Click Here link in the Sign In box.  You will see the New Member Sign Up page. 3. Enter your Mbaob[employer] Access Code exactly as it appears below. You will not need to use this code after youve completed the sign-up process. If you do not sign up before the expiration date, you must request a new code. · Mbaob[employer] Access Code: [email or web address] Expires: 9/27/2018 10:54 AM 
 
4. Enter the last four digits of your Social Security Number (xxxx) and Date of Birth (mm/dd/yyyy) as indicated and click Submit. You will be taken to the next sign-up page. 5. Create a [employer] ID. This will be your Mbaobao login ID and cannot be changed, so think of one that is secure and easy to remember. 6. Create a Mbaob[employer] password. You can change your password at any time. 7. Enter your Password Reset Question and Answer. This can be used at a later time if you forget your password. 8. Enter your e-mail address. You will receive e-mail notification when new information is available in [address] 9. Click Sign Up. You can now view and download portions of your medical record. 10. Click the Download Summary menu link to download a portable copy of your medical information. If you have questions, please visit the Frequently Asked Questions section of the Mbaob[employer] website. Remember, Mbaobao is NOT to be used for urgent needs. For medical emergencies, dial 911. Now available from your iPhone and Android! Please provide this summary of care documentation to your next provider. Your primary care clinician is listed as Hilton Ramírez. Janiya Lino. If you have any questions after today's visit, please call [phone].

## 2018-07-24 NOTE — PROGRESS NOTES
1. Have you been to the ER, urgent care clinic since your last visit? Hospitalized since your last visit? No    2. Have you seen or consulted any other health care providers outside of the [address] [place] [address] since your last visit? Include any pap smears or colon screening.  No

## 2018-07-25 ENCOUNTER — OFFICE VISIT (OUTPATIENT)
Dept: INTERNAL MEDICINE CLINIC | Age: 83
End: 2018-07-25

## 2018-07-25 VITALS
HEART RATE: 78 BPM | HEIGHT: 60 IN | DIASTOLIC BLOOD PRESSURE: 64 MMHG | WEIGHT: 125 LBS | RESPIRATION RATE: 14 BRPM | TEMPERATURE: 97.7 F | BODY MASS INDEX: 24.54 KG/M2 | OXYGEN SATURATION: 98 % | SYSTOLIC BLOOD PRESSURE: 132 MMHG

## 2018-07-25 DIAGNOSIS — F32.0 MILD SINGLE CURRENT EPISODE OF MAJOR DEPRESSIVE DISORDER (HCC): ICD-10-CM

## 2018-07-25 DIAGNOSIS — I48.20 CHRONIC ATRIAL FIBRILLATION (HCC): Primary | ICD-10-CM

## 2018-07-25 DIAGNOSIS — I10 ESSENTIAL HYPERTENSION WITH GOAL BLOOD PRESSURE LESS THAN 140/90: ICD-10-CM

## 2018-07-25 RX ORDER — POLYETHYLENE GLYCOL 3350 17 G/17G
17 POWDER, FOR SOLUTION ORAL DAILY
COMMUNITY

## 2018-07-25 NOTE — MR AVS SNAPSHOT
[address] [facility] 
 
 
 [address], [address] [place] [address] [facility] 
[phone] Patient: Dane Roth MRN: [id number] Visit Information Date & Time Provider Department Dept. Phone Encounter #  
 7/25/2018 10:00 AM Prachi Page MD [facility] [address] [place] [address] [phone] [id number] Follow-up Instructions Return in about 2 months (around 9/25/2018). Your Appointments 8/9/2018 10:05 AM  
LAB with [facility] NURSE VISIT [facility] [address] [place] [address] ([facility]) Appt Note: lab  
 [address], [address] [place] [address] [address]  
  
   
 [address], [address]  
  
    
 8/23/2018 10:30 AM  
PHYSICAL with Prachi Page MD  
[facility]ists of [address] [place] [address]) Appt Note: rpe  
 [address] [facility] [address]  
  
   
 [address], [address]  
  
    
 9/25/2018 11:00 AM  
Office Visit with Prachi Page MD  
[facility] of [address] [place] [address]) Appt Note: 2 month F/U  
 [address] [facility], [address]  
  
   
 [address] [facility], [address]  
  
    
 1/29/2019 10:20 AM  
Follow Up with Ash Baker MD  
[facility] ([facility]) Appt Note: 6 mo f/u  
 [address] [address]  
[phone] [address] [place] [address] [address] Upcoming Health Maintenance Date Due  
 FOOT EXAM Q1 12/29/1938 HEMOGLOBIN A1C Q6M 2/18/2018 EYE EXAM RETINAL OR DILATED Q1 3/21/2018 Influenza Age 5 to Adult 8/1/2018 MICROALBUMIN Q1 8/31/2018 MEDICARE YEARLY EXAM 9/1/2018 LIPID PANEL Q1 1/2/2019 GLAUCOMA SCREENING Q2Y 3/21/2019 DTaP/Tdap/Td series (2 - Td) 8/31/2027 Allergies as of 7/25/2018  Review Complete On: 7/25/2018 By: Mattie Norton MD  
  
 Severity Noted Reaction Type Reactions Bactrim [Sulfamethoprim Ds] High 02/11/2013   Systemic Nausea and Vomiting Vicodin [Hydrocodone-acetaminophen] Medium 10/03/2012   Side Effect Nausea Only Atenolol    Other (comments)  
 interolance Atenolol  05/09/2017    Other (comments) Black out Codeine  05/09/2017    Other (comments)  
 headache Erythromycin    Unknown (comments) Erythromycin  05/09/2017    Hives, Itching Other Medication    Not Reported This Time Refined sugar Pcn [Penicillins]    Hives Penicillin G  05/09/2017    Hives, Itching Current Immunizations  Reviewed on 8/31/2017 Name Date Influenza High Dose Vaccine PF 8/31/2017 10:27 AM, 8/26/2016 10:11 AM, 10/12/2015 11:23 AM, 9/10/2014  3:33 PM  
 Influenza Vaccine 10/24/2013 Influenza Vaccine Split 10/28/2011 Influenza Vaccine Whole 10/25/2010 Pneumococcal Conjugate (PCV-13) 4/21/2016 10:28 AM  
 Pneumococcal Polysaccharide (PPSV-23) 4/28/2017  9:07 AM  
 TB Skin Test (PPD) 1/1/1992 Not reviewed this visit Vitals BP Pulse Temp Resp Height(growth percentile) Weight(growth percentile) 132/64 78 97.7 °F (36.5 °C) (Oral) 14 5' (1.524 m) 125 lb (56.7 kg) SpO2 BMI OB Status Smoking Status 98% 24.41 kg/m2 Hysterectomy Never Smoker Vitals History BMI and BSA Data Body Mass Index Body Surface Area  
 24.41 kg/m 2 1.55 m 2 Preferred Pharmacy Pharmacy Name Phone [facility] [address] [facility], [facility] [phone] Your Updated Medication List  
  
   
This list is accurate as of 7/25/18 10:53 AM.  Always use your most recent med list.  
  
  
  
  
 ACIDOPHILUS Cap Generic drug:  Lactobacillus acidophilus Take 2 Caps by mouth two (2) times a day. ALEVE 220 mg Cap Generic drug:  naproxen sodium Take  by mouth. amLODIPine 10 mg tablet Commonly known as:  NORVASC  
take 1 tablet by mouth once daily  
  
 ascorbic acid (vitamin C) 250 mg tablet Commonly known as:  VITAMIN C Take 1 Tab by mouth two (2) times daily (with meals). [Request refills from PCP (Dr. Jacinta Chapman)] aspirin 81 mg tablet Take 81 mg by mouth daily. carvedilol 12.5 mg tablet Commonly known as:  COREG  
take 1 tablet by mouth twice a day  
  
 cholecalciferol 1,000 unit tablet Commonly known as:  VITAMIN D3 Take 2 Tabs by mouth daily. [Request refills from PCP (Dr. Jacinta Chapman)] FISH OIL PO Take 3 Tabs by mouth daily. hydroCHLOROthiazide 12.5 mg capsule Commonly known as:  [place] Take 1 Cap by mouth daily. losartan 50 mg tablet Commonly known as:  COZAAR Take 1 Tab by mouth daily. lovastatin 20 mg tablet Commonly known as:  MEVACOR Take 1 Tab by mouth daily. MIRALAX 17 gram packet Generic drug:  polyethylene glycol Take 17 g by mouth daily. multivitamin tablet Commonly known as:  ONE A DAY Take 1 Tab by mouth daily. PriLOSEC 20 mg capsule Generic drug:  omeprazole Take 20 mg by mouth daily as needed. promethazine 25 mg tablet Commonly known as:  PHENERGAN Take 1 Tab by mouth every six (6) hours as needed for Nausea. tolterodine 2 mg tablet Commonly known as:  Benuel Halter Take 1 Tab by mouth two (2) times a day. Follow-up Instructions Return in about 2 months (around 9/25/2018). Introducing [facility]! [facility] introduces [employer] patient portal. Now you can access parts of your medical record, email your doctor's office, and request medication refills online. 1. In your internet browser, go to [email or web address] 2. Click on the First Time User? Click Here link in the Sign In box. You will see the New Member Sign Up page. 3. Enter your TapInfluence Access Code exactly as it appears below. You will not need to use this code after youve completed the sign-up process. If you do not sign up before the expiration date, you must request a new code. · TapInfluence Access Code: [email or web address] Expires: 9/27/2018 10:54 AM 
 
4. Enter the last four digits of your Social Security Number (xxxx) and Date of Birth (mm/dd/yyyy) as indicated and click Submit. You will be taken to the next sign-up page. 5. Create a TapInfluence ID. This will be your TapInfluence login ID and cannot be changed, so think of one that is secure and easy to remember. 6. Create a TapInfluence password. You can change your password at any time. 7. Enter your Password Reset Question and Answer. This can be used at a later time if you forget your password. 8. Enter your e-mail address. You will receive e-mail notification when new information is available in [address] 9. Click Sign Up. You can now view and download portions of your medical record. 10. Click the Download Summary menu link to download a portable copy of your medical information. If you have questions, please visit the Frequently Asked Questions section of the TapInfluence website. Remember, TapInfluence is NOT to be used for urgent needs. For medical emergencies, dial 911. Now available from your iPhone and Android! Please provide this summary of care documentation to your next provider. Your primary care clinician is listed as Anselmo Santa. If you have any questions after today's visit, please call [phone].

## 2018-07-25 NOTE — PROGRESS NOTES
Charles Hameed,born 12/29/1928, is a 80 y.o. female, who is seen today for reevaluation of depression hypertension nausea ASHD GERD overactive bladder. Since her 's death fairly recently she has been quite depressed to the point of nausea. She was started on Lexapro and nausea worsened somewhat to the point where she finally stopped Lexapro after several days and she is not sure if it was helping her or not but could not stand the nausea. She was started on Phenergan and she is pretty sure that helped with the nausea but she is using that less frequently and the nausea is getting better, she is resting better at night and is here with her daughter as usual today. She is having no chest pain or dyspnea. She is doing pretty well with Detrol for overactive bladder symptoms. She is taking all of her other medicine correctly and having no reflux as she continues omeprazole. Past Medical History:   Diagnosis Date    Allergic rhinitis     Anemia     Asymptomatic carotid bruit     asymptomatic    Atrial fibrillation ([facility] 04/2017    Started on Xarelto    Atrial fibrillation ([facility]     Cardiac cath 12/15/2010    pRCA 30%. LM patent. LAD 25%. CX 30%. LVEDP 10 mmHg. EF 60%. Mild AS.  Cardiac nuclear imaging test, low to mod risk 02/22/2016    Low to intermediate risk. Sm reversible inferoapical defect may be a sm area of ischemia. No prior infarction. EF >75%. Neg EKG on pharm stress test.    Carotid duplex 02/10/2014    Mild 1-49% bilateral ICA stenosis.      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 Manning Fat Hypertension     Lung collapse Dec 2011    Collapse of left upper lobe    Mild aortic stenosis 8/22/2011    Osteoarthritis     Osteoporosis     Positive PPD      Current Outpatient Prescriptions   Medication Sig Dispense Refill    polyethylene glycol (MIRALAX) 17 gram packet Take 17 g by mouth daily.  promethazine (PHENERGAN) 25 mg tablet Take 1 Tab by mouth every six (6) hours as needed for Nausea. 30 Tab 0    hydroCHLOROthiazide (MICROZIDE) 12.5 mg capsule Take 1 Cap by mouth daily. 90 Cap 3    losartan (COZAAR) 50 mg tablet Take 1 Tab by mouth daily. 90 Tab 3    amLODIPine (NORVASC) 10 mg tablet take 1 tablet by mouth once daily 30 Tab 6    tolterodine (DETROL) 2 mg tablet Take 1 Tab by mouth two (2) times a day. 180 Tab 3    naproxen sodium (ALEVE) 220 mg cap Take  by mouth.  carvedilol (COREG) 12.5 mg tablet take 1 tablet by mouth twice a day 180 Tab 3    lovastatin (MEVACOR) 20 mg tablet Take 1 Tab by mouth daily. 90 Tab 3    multivitamin (ONE A DAY) tablet Take 1 Tab by mouth daily.  Lactobacillus acidophilus (ACIDOPHILUS) cap Take 2 Caps by mouth two (2) times a day.  omeprazole (PRILOSEC) 20 mg capsule Take 20 mg by mouth daily as needed.  ascorbic acid (VITAMIN C) 250 mg tablet Take 1 Tab by mouth two (2) times daily (with meals). [Request refills from PCP (Dr. Remigio Chapman)] 30 Tab 0    cholecalciferol (VITAMIN D3) 1,000 unit tablet Take 2 Tabs by mouth daily. [Request refills from PCP (Dr. Remigio Chapman)] 30 Tab 0    aspirin 81 mg tablet Take 81 mg by mouth daily.  DOCOSAHEXANOIC ACID/EPA (FISH OIL PO) Take 3 Tabs by mouth daily.        Visit Vitals    /64    Pulse 78  Temp 97.7 °F (36.5 °C) (Oral)    Resp 14    Ht 5' (1.524 m)    Wt 125 lb (56.7 kg)    SpO2 98%    BMI 24.41 kg/m2     Carotids are 2+ without bruits. Lungs are clear to percussion. Good breath sounds with no wheezing or crackles. Heart reveals a regular rhythm with normal S1 and S2 no murmur gallop click or rub. Apical impulse is not palpable. Abdomen is soft and nontender with no hepatosplenomegaly or masses and no bruits. Extremities reveal no clubbing cyanosis or edema. Pulses are 2+. Results for orders placed or performed during the hospital encounter of 07/03/18   CBC WITH AUTOMATED DIFF   Result Value Ref Range    WBC 6.1 4.6 - 13.2 K/uL    RBC 4.16 (L) 4.20 - 5.30 M/uL    HGB 13.6 12.0 - 16.0 g/dL    HCT 40.2 35.0 - 45.0 %    MCV 96.6 74.0 - 97.0 FL    MCH 32.7 24.0 - 34.0 PG    MCHC 33.8 31.0 - 37.0 g/dL    RDW 12.8 11.6 - 14.5 %    PLATELET 273 051 - 340 K/uL    MPV 10.3 9.2 - 11.8 FL    NEUTROPHILS 70 40 - 73 %    LYMPHOCYTES 20 (L) 21 - 52 %    MONOCYTES 9 3 - 10 %    EOSINOPHILS 0 0 - 5 %    BASOPHILS 1 0 - 2 %    ABS. NEUTROPHILS 4.3 1.8 - 8.0 K/UL    ABS. LYMPHOCYTES 1.2 0.9 - 3.6 K/UL    ABS. MONOCYTES 0.6 0.05 - 1.2 K/UL    ABS. EOSINOPHILS 0.0 0.0 - 0.4 K/UL    ABS. BASOPHILS 0.0 0.0 - 0.06 K/UL    DF AUTOMATED     METABOLIC PANEL, COMPREHENSIVE   Result Value Ref Range    Sodium 135 (L) 136 - 145 mmol/L    Potassium 4.9 3.5 - 5.5 mmol/L    Chloride 95 (L) 100 - 108 mmol/L    CO2 29 21 - 32 mmol/L    Anion gap 11 3.0 - 18 mmol/L    Glucose 129 (H) 74 - 99 mg/dL    BUN 27 (H) 7.0 - 18 MG/DL    Creatinine 1.07 0.6 - 1.3 MG/DL    BUN/Creatinine ratio 25 (H) 12 - 20      GFR est AA 59 (L) >60 ml/min/1.73m2    GFR est non-AA 48 (L) >60 ml/min/1.73m2    Calcium 9.8 8.5 - 10.1 MG/DL    Bilirubin, total 0.9 0.2 - 1.0 MG/DL    ALT (SGPT) 23 13 - 56 U/L    AST (SGOT) 27 15 - 37 U/L    Alk.  phosphatase 65 45 - 117 U/L    Protein, total 7.5 6.4 - 8.2 g/dL    Albumin 3.8 3.4 - 5.0 g/dL Globulin 3.7 2.0 - 4.0 g/dL    A-G Ratio 1.0 0.8 - 1.7       Assessment: #1. Depression after her 's recent death finally doing somewhat better. She is definitely not suicidal and is getting out more and interacting with others including her daughter more. I recommended she continue to get out of the house and interact with people try to get into some of her previous activities and the depression worsens again she is to call me. #2. Nausea is better, she will continue omeprazole and use Phenergan as needed but I have encouraged her to try to get by with less Phenergan and eventually she should be able to stop this medicine, hopefully within the next 2 or 3 weeks. She does not seem to have a significant mucosal component to her symptoms but nonetheless I recommended she continue omeprazole 20 mg daily for now for her reflux and it may be helping calm down her nausea symptoms as well. #4.  Constipation is somewhat better, she will continue polyethylene glycol as needed. #5. Hypertension is controlled. She will continue losartan 50 mg daily, hydrochlorothiazide 12.5 mg daily and amlodipine 10 mg daily. #6.  ASHD asymptomatic, she will continue aspirin 81 mg daily and she will use nitroglycerin if needed and call me or her cardiologist if chest symptoms progress. Follow-up in 2 months or sooner if needed, no lab at that time. She is still dependent on her daughter for driving her places so I will fill out an FMLA form to help her in that regard. Barry Jones MD FACP    Please note: This document has been produced using voice recognition software. Unrecognized errors in transcription may be present.

## 2018-08-09 ENCOUNTER — LAB ONLY (OUTPATIENT)
Dept: INTERNAL MEDICINE CLINIC | Age: 83
End: 2018-08-09

## 2018-08-09 ENCOUNTER — HOSPITAL ENCOUNTER (OUTPATIENT)
Dept: LAB | Age: 83
Discharge: HOME | End: 2018-08-09
Payer: MEDICARE

## 2018-08-09 DIAGNOSIS — E11.9 TYPE 2 DIABETES MELLITUS WITHOUT COMPLICATION, UNSPECIFIED WHETHER LONG TERM INSULIN USE (HCC): Primary | ICD-10-CM

## 2018-08-09 DIAGNOSIS — I48.0 PAF (PAROXYSMAL ATRIAL FIBRILLATION) (HCC): ICD-10-CM

## 2018-08-09 DIAGNOSIS — E11.9 TYPE 2 DIABETES MELLITUS WITHOUT COMPLICATION, UNSPECIFIED WHETHER LONG TERM INSULIN USE (HCC): ICD-10-CM

## 2018-08-09 DIAGNOSIS — I10 ESSENTIAL HYPERTENSION WITH GOAL BLOOD PRESSURE LESS THAN 140/90: ICD-10-CM

## 2018-08-09 DIAGNOSIS — I25.10 ATHEROSCLEROSIS OF NATIVE CORONARY ARTERY OF NATIVE HEART WITHOUT ANGINA PECTORIS: Chronic | ICD-10-CM

## 2018-08-09 LAB
ALBUMIN SERPL-MCNC: 3.8 G/DL (ref 3.4–5)
ALBUMIN/GLOB SERPL: 1.1 {RATIO} (ref 0.8–1.7)
ALP SERPL-CCNC: 69 U/L (ref 45–117)
ALT SERPL-CCNC: 28 U/L (ref 13–56)
ANION GAP SERPL CALC-SCNC: 8 MMOL/L (ref 3–18)
AST SERPL-CCNC: 26 U/L (ref 15–37)
BASOPHILS # BLD: 0 K/UL (ref 0–0.1)
BASOPHILS NFR BLD: 1 % (ref 0–2)
BILIRUB SERPL-MCNC: 0.9 MG/DL (ref 0.2–1)
BUN SERPL-MCNC: 50 MG/DL (ref 7–18)
BUN/CREAT SERPL: 42 (ref 12–20)
CALCIUM SERPL-MCNC: 9.1 MG/DL (ref 8.5–10.1)
CHLORIDE SERPL-SCNC: 104 MMOL/L (ref 100–108)
CHOLEST SERPL-MCNC: 134 MG/DL
CO2 SERPL-SCNC: 29 MMOL/L (ref 21–32)
CREAT SERPL-MCNC: 1.18 MG/DL (ref 0.6–1.3)
DIFFERENTIAL METHOD BLD: ABNORMAL
EOSINOPHIL # BLD: 0.1 K/UL (ref 0–0.4)
EOSINOPHIL NFR BLD: 2 % (ref 0–5)
ERYTHROCYTE [DISTWIDTH] IN BLOOD BY AUTOMATED COUNT: 13.7 % (ref 11.6–14.5)
EST. AVERAGE GLUCOSE BLD GHB EST-MCNC: 120 MG/DL
GLOBULIN SER CALC-MCNC: 3.4 G/DL (ref 2–4)
GLUCOSE SERPL-MCNC: 99 MG/DL (ref 74–99)
HBA1C MFR BLD: 5.8 % (ref 4.2–5.6)
HCT VFR BLD AUTO: 37.9 % (ref 35–45)
HDLC SERPL-MCNC: 91 MG/DL (ref 40–60)
HDLC SERPL: 1.5 {RATIO} (ref 0–5)
HGB BLD-MCNC: 12.3 G/DL (ref 12–16)
LDLC SERPL CALC-MCNC: 33 MG/DL (ref 0–100)
LIPID PROFILE,FLP: ABNORMAL
LYMPHOCYTES # BLD: 1.3 K/UL (ref 0.9–3.6)
LYMPHOCYTES NFR BLD: 21 % (ref 21–52)
MCH RBC QN AUTO: 32.4 PG (ref 24–34)
MCHC RBC AUTO-ENTMCNC: 32.5 G/DL (ref 31–37)
MCV RBC AUTO: 99.7 FL (ref 74–97)
MONOCYTES # BLD: 0.5 K/UL (ref 0.05–1.2)
MONOCYTES NFR BLD: 9 % (ref 3–10)
NEUTS SEG # BLD: 4.1 K/UL (ref 1.8–8)
NEUTS SEG NFR BLD: 67 % (ref 40–73)
PLATELET # BLD AUTO: 180 K/UL (ref 135–420)
PMV BLD AUTO: 10.7 FL (ref 9.2–11.8)
POTASSIUM SERPL-SCNC: 4 MMOL/L (ref 3.5–5.5)
PROT SERPL-MCNC: 7.2 G/DL (ref 6.4–8.2)
RBC # BLD AUTO: 3.8 M/UL (ref 4.2–5.3)
SODIUM SERPL-SCNC: 141 MMOL/L (ref 136–145)
T4 FREE SERPL-MCNC: 1.4 NG/DL (ref 0.7–1.5)
TRIGL SERPL-MCNC: 50 MG/DL (ref ?–150)
TSH SERPL DL<=0.05 MIU/L-ACNC: 1.3 UIU/ML (ref 0.36–3.74)
VLDLC SERPL CALC-MCNC: 10 MG/DL
WBC # BLD AUTO: 6.1 K/UL (ref 4.6–13.2)

## 2018-08-09 PROCEDURE — 36415 COLL VENOUS BLD VENIPUNCTURE: CPT | Performed by: INTERNAL MEDICINE

## 2018-08-09 PROCEDURE — 80053 COMPREHEN METABOLIC PANEL: CPT | Performed by: INTERNAL MEDICINE

## 2018-08-09 PROCEDURE — 84439 ASSAY OF FREE THYROXINE: CPT | Performed by: INTERNAL MEDICINE

## 2018-08-09 PROCEDURE — 84443 ASSAY THYROID STIM HORMONE: CPT | Performed by: INTERNAL MEDICINE

## 2018-08-09 PROCEDURE — 85025 COMPLETE CBC W/AUTO DIFF WBC: CPT | Performed by: INTERNAL MEDICINE

## 2018-08-09 PROCEDURE — 80061 LIPID PANEL: CPT | Performed by: INTERNAL MEDICINE

## 2018-08-09 PROCEDURE — 83036 HEMOGLOBIN GLYCOSYLATED A1C: CPT | Performed by: INTERNAL MEDICINE

## 2018-08-13 ENCOUNTER — APPOINTMENT (OUTPATIENT)
Dept: INTERNAL MEDICINE CLINIC | Age: 83
End: 2018-08-13

## 2018-08-13 ENCOUNTER — HOSPITAL ENCOUNTER (OUTPATIENT)
Dept: LAB | Age: 83
Discharge: HOME | End: 2018-08-13
Payer: MEDICARE

## 2018-08-13 DIAGNOSIS — E11.9 TYPE 2 DIABETES MELLITUS WITHOUT COMPLICATION, UNSPECIFIED WHETHER LONG TERM INSULIN USE (HCC): ICD-10-CM

## 2018-08-13 PROCEDURE — 82043 UR ALBUMIN QUANTITATIVE: CPT | Performed by: INTERNAL MEDICINE

## 2018-08-14 LAB
CREAT UR-MCNC: 27.21 MG/DL (ref 30–125)
MICROALBUMIN UR-MCNC: 2.3 MG/DL (ref 0–3)
MICROALBUMIN/CREAT UR-RTO: 85 MG/G (ref 0–30)

## 2018-08-23 ENCOUNTER — HOSPITAL ENCOUNTER (OUTPATIENT)
Dept: LAB | Age: 83
Discharge: HOME | End: 2018-08-23
Payer: MEDICARE

## 2018-08-23 ENCOUNTER — OFFICE VISIT (OUTPATIENT)
Dept: INTERNAL MEDICINE CLINIC | Age: 83
End: 2018-08-23

## 2018-08-23 VITALS
WEIGHT: 127.4 LBS | SYSTOLIC BLOOD PRESSURE: 112 MMHG | OXYGEN SATURATION: 97 % | BODY MASS INDEX: 25.01 KG/M2 | HEART RATE: 77 BPM | RESPIRATION RATE: 14 BRPM | DIASTOLIC BLOOD PRESSURE: 60 MMHG | HEIGHT: 60 IN | TEMPERATURE: 97.7 F

## 2018-08-23 DIAGNOSIS — R06.09 DYSPNEA ON EXERTION: ICD-10-CM

## 2018-08-23 DIAGNOSIS — I25.10 ATHEROSCLEROSIS OF NATIVE CORONARY ARTERY OF NATIVE HEART WITHOUT ANGINA PECTORIS: Primary | Chronic | ICD-10-CM

## 2018-08-23 DIAGNOSIS — I10 ESSENTIAL HYPERTENSION WITH GOAL BLOOD PRESSURE LESS THAN 140/90: ICD-10-CM

## 2018-08-23 LAB — BNP SERPL-MCNC: 2164 PG/ML (ref 0–1800)

## 2018-08-23 PROCEDURE — 83880 ASSAY OF NATRIURETIC PEPTIDE: CPT | Performed by: INTERNAL MEDICINE

## 2018-08-23 NOTE — MR AVS SNAPSHOT
[address] [place] [address] [facility], [address] [place] [address] [facility] 
[phone] Patient: Salo Roach MRN: [id number] Visit Information Date & Time Provider Department Dept. Phone Encounter #  
 8/23/2018 10:30 AM Sydney Price MD [facility] [phone] Follow-up Instructions Return in about 1 month (around 9/23/2018). Your Appointments 9/25/2018 11:00 AM  
Office Visit with Sydney Price MD  
[facility] [address]) Appt Note: 2 month F/U  
 [address] [facility], [address] [address] [address]  
  
   
 [address] [facility], [address]  
  
    
 1/29/2019 10:20 AM  
Follow Up with Moises Humphrey MD  
[facility] \[facility] [place] [facility]\"" ([address]) Appt Note: 6 mo f/u  
 [address] [address] [address] [address] [phone] 23[address] [facility] [address] Upcoming Health Maintenance Date Due  
 EYE EXAM RETINAL OR DILATED Q1 3/21/2018 Influenza Age 5 to Adult 8/1/2018 MEDICARE YEARLY EXAM 9/1/2018 HEMOGLOBIN A1C Q6M 2/9/2019 GLAUCOMA SCREENING Q2Y 3/21/2019 LIPID PANEL Q1 8/9/2019 MICROALBUMIN Q1 8/13/2019 FOOT EXAM Q1 8/23/2019 DTaP/Tdap/Td series (2 - Td) 8/31/2027 Allergies as of 8/23/2018  Review Complete On: 8/23/2018 By: Sydney Price MD  
  
 Severity Noted Reaction Type Reactions Bactrim [Sulfamethoprim Ds] High 02/11/2013   Systemic Nausea and Vomiting Vicodin [Hydrocodone-acetaminophen] Medium 10/03/2012   Side Effect Nausea Only Atenolol    Other (comments)  
 interolance Atenolol  05/09/2017    Other (comments) Black out Codeine  05/09/2017    Other (comments)  
 headache Erythromycin    Unknown (comments) Erythromycin  05/09/2017    Hives, Itching Other Medication    Not Reported This Time Refined sugar Pcn [Penicillins]    Hives Penicillin G  05/09/2017    Hives, Itching Current Immunizations  Reviewed on 8/31/2017 Name Date Influenza High Dose Vaccine PF 8/31/2017 10:27 AM, 8/26/2016 10:11 AM, 10/12/2015 11:23 AM, 9/10/2014  3:33 PM  
 Influenza Vaccine 10/24/2013 Influenza Vaccine Split 10/28/2011 Influenza Vaccine Whole 10/25/2010 Pneumococcal Conjugate (PCV-13) 4/21/2016 10:28 AM  
 Pneumococcal Polysaccharide (PPSV-23) 4/28/2017  9:07 AM  
 TB Skin Test (PPD) 1/1/1992 Not reviewed this visit You Were Diagnosed With   
  
 Codes Comments Atherosclerosis of native coronary artery of native heart without angina pectoris    -  Primary ICD-10-CM: I25.10 ICD-9-CM: 414.01 Essential hypertension with goal blood pressure less than 140/90     ICD-10-CM: I10 
ICD-9-CM: 401.9 Dyspnea on exertion     ICD-10-CM: R06.09 
ICD-9-CM: 786.09 Vitals BP Pulse Temp Resp Height(growth percentile) Weight(growth percentile) 112/60 77 97.7 °F (36.5 °C) (Oral) 14 5' (1.524 m) 127 lb 6.4 oz (57.8 kg) SpO2 BMI OB Status Smoking Status 97% 24.88 kg/m2 Hysterectomy Never Smoker Vitals History BMI and BSA Data Body Mass Index Body Surface Area  
 24.88 kg/m 2 1.56 m 2 Preferred Pharmacy Pharmacy Name Phone [facility] [address] [facility], [address] [place] [address] [phone] Your Updated Medication List  
  
   
This list is accurate as of 8/23/18 11:22 AM.  Always use your most recent med list.  
  
  
  
  
 ACIDOPHILUS Cap Generic drug:  Lactobacillus acidophilus Take 2 Caps by mouth two (2) times a day. ALEVE 220 mg Cap Generic drug:  naproxen sodium Take  by mouth. amLODIPine 10 mg tablet Commonly known as:  NORVASC  
take 1 tablet by mouth once daily  
  
 ascorbic acid (vitamin C) 250 mg tablet Commonly known as:  VITAMIN C  
 Take 1 Tab by mouth two (2) times daily (with meals). [Request refills from PCP (Dr. Beronica Chapman)] aspirin 81 mg tablet Take 81 mg by mouth daily. carvedilol 12.5 mg tablet Commonly known as:  COREG  
take 1 tablet by mouth twice a day  
  
 cholecalciferol 1,000 unit tablet Commonly known as:  VITAMIN D3 Take 2 Tabs by mouth daily. [Request refills from PCP (Dr. Beronica Chapman)] FISH OIL PO Take 3 Tabs by mouth daily. hydroCHLOROthiazide 12.5 mg capsule Commonly known as:  [place] Take 1 Cap by mouth daily. losartan 50 mg tablet Commonly known as:  COZAAR Take 1 Tab by mouth daily. lovastatin 20 mg tablet Commonly known as:  MEVACOR Take 1 Tab by mouth daily. MIRALAX 17 gram packet Generic drug:  polyethylene glycol Take 17 g by mouth daily. multivitamin tablet Commonly known as:  ONE A DAY Take 1 Tab by mouth daily. PriLOSEC 20 mg capsule Generic drug:  omeprazole Take 20 mg by mouth daily as needed. promethazine 25 mg tablet Commonly known as:  PHENERGAN Take 1 Tab by mouth every six (6) hours as needed for Nausea. tolterodine 2 mg tablet Commonly known as:  [place]al [place] Take 1 Tab by mouth two (2) times a day. Follow-up Instructions Return in about 1 month (around 9/23/2018). To-Do List   
 Around 08/23/2018 Lab:  NT-PRO BNP Introducing \[facility]\"" & [facility] SERVICES! Carolyn Leary introduces [employer] patient portal. Now you can access parts of your medical record, email your doctor's office, and request medication refills online. 1. In your internet browser, go to [email or web address] 2. Click on the First Time User? Click Here link in the Sign In box. You will see the New Member Sign Up page. 3. Enter your eCert Access Code exactly as it appears below. You will not need to use this code after youve completed the sign-up process.  If you do not sign up before the expiration date, you must request a new code. · Unified Inbox Access Code: [email or web address] Expires: 9/27/2018 10:54 AM 
 
4. Enter the last four digits of your Social Security Number (xxxx) and Date of Birth (mm/dd/yyyy) as indicated and click Submit. You will be taken to the next sign-up page. 5. Create a Unified Inbox ID. This will be your Unified Inbox login ID and cannot be changed, so think of one that is secure and easy to remember. 6. Create a Unified Inbox password. You can change your password at any time. 7. Enter your Password Reset Question and Answer. This can be used at a later time if you forget your password. 8. Enter your e-mail address. You will receive e-mail notification when new information is available in [address] 9. Click Sign Up. You can now view and download portions of your medical record. 10. Click the Download Summary menu link to download a portable copy of your medical information. If you have questions, please visit the Frequently Asked Questions section of the Unified Inbox website. Remember, Unified Inbox is NOT to be used for urgent needs. For medical emergencies, dial 911. Now available from your iPhone and Android! Please provide this summary of care documentation to your next provider. Your primary care clinician is listed as Yamilka Childress. If you have any questions after today's visit, please call [phone].

## 2018-08-23 NOTE — PROGRESS NOTES
Ravi Hameed,born 12/29/1928, is a 80 y.o. female, who is seen today for reevaluation of atherosclerotic heart disease, chronic atrial fibrillation, impaired fasting glucose but no diabetes, hypertension, dyslipidemia, but also complaining of new edema and some dyspnea and wheezing. For 3 days she has noticed some swelling in her left ankle more than the right and she virtually never has this. Because down some at night but not all the way. She has heard herself wheeze a little bit off and on the last 2 or 3 days, no coughing perhaps some tiredness and slight dyspnea compared with usual.  No palpitations. No exertional chest discomfort. No other new complaints. She takes all of her medicine correctly. She eats a very healthy diet and maintains a normal weight, she used to be overweight but no longer. Past Medical History:   Diagnosis Date    Allergic rhinitis     Anemia     Asymptomatic carotid bruit     asymptomatic    Atrial fibrillation ([address] 04/2017    Started on Xarelto    Atrial fibrillation ([address]     Cardiac cath 12/15/2010    pRCA 30%. LM patent. LAD 25%. CX 30%. LVEDP 10 mmHg. EF 60%. Mild AS.  Cardiac nuclear imaging test, low to mod risk 02/22/2016    Low to intermediate risk. Sm reversible inferoapical defect may be a sm area of ischemia. No prior infarction. EF >75%. Neg EKG on pharm stress test.    Carotid duplex 02/10/2014    Mild 1-49% bilateral ICA stenosis.      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 Abigail Briones Hypertension     Lung collapse Dec 2011    Collapse of left upper lobe    Mild aortic stenosis 8/22/2011    Osteoarthritis     Osteoporosis     Positive PPD      Past Surgical History:   Procedure Laterality Date    HX CATARACT REMOVAL      HX COLONOSCOPY      HX HEART CATHETERIZATION  12/15/10    negative    HX HYSTERECTOMY      HX KNEE ARTHROSCOPY      HX PTCA  3/1995    of the LAD; has had no recurrent angina since    HX TONSILLECTOMY      MT BRONCHOSCOPY BRONCHIAL/ENDOBRNCL BX 1+ SITES  9/25/2012          Current Outpatient Prescriptions   Medication Sig Dispense Refill    polyethylene glycol (MIRALAX) 17 gram packet Take 17 g by mouth daily.  promethazine (PHENERGAN) 25 mg tablet Take 1 Tab by mouth every six (6) hours as needed for Nausea. 30 Tab 0    hydroCHLOROthiazide (MICROZIDE) 12.5 mg capsule Take 1 Cap by mouth daily. 90 Cap 3    losartan (COZAAR) 50 mg tablet Take 1 Tab by mouth daily. 90 Tab 3    amLODIPine (NORVASC) 10 mg tablet take 1 tablet by mouth once daily 30 Tab 6    tolterodine (DETROL) 2 mg tablet Take 1 Tab by mouth two (2) times a day. 180 Tab 3    naproxen sodium (ALEVE) 220 mg cap Take  by mouth.  carvedilol (COREG) 12.5 mg tablet take 1 tablet by mouth twice a day 180 Tab 3    lovastatin (MEVACOR) 20 mg tablet Take 1 Tab by mouth daily. 90 Tab 3    multivitamin (ONE A DAY) tablet Take 1 Tab by mouth daily.  Lactobacillus acidophilus (ACIDOPHILUS) cap Take 2 Caps by mouth two (2) times a day.  omeprazole (PRILOSEC) 20 mg capsule Take 20 mg by mouth daily as needed.  ascorbic acid (VITAMIN C) 250 mg tablet Take 1 Tab by mouth two (2) times daily (with meals). [Request refills from PCP (Dr. Beronica Chapman)] 30 Tab 0    cholecalciferol (VITAMIN D3) 1,000 unit tablet Take 2 Tabs by mouth daily.  [Request refills from PCP (Dr. Mary Chapman)] 30 Tab 0    aspirin 81 mg tablet Take 81 mg by mouth daily.  DOCOSAHEXANOIC ACID/EPA (FISH OIL PO) Take 3 Tabs by mouth daily. Allergies   Allergen Reactions    Bactrim [Sulfamethoprim Ds] Nausea and Vomiting    Vicodin [Hydrocodone-Acetaminophen] Nausea Only    Atenolol Other (comments)     interolance    Atenolol Other (comments)     Black out    Codeine Other (comments)     headache    Erythromycin Unknown (comments)    Erythromycin Hives and Itching    Other Medication Not Reported This Time     Refined sugar    Pcn [Penicillins] Hives    Penicillin G Hives and Itching     Social History     Social History    Marital status:      Spouse name: N/A    Number of children: N/A    Years of education: N/A     Social History Main Topics    Smoking status: Never Smoker    Smokeless tobacco: Never Used      Comment: extensive secondhand smoke exposure through job    Alcohol use No    Drug use: No    Sexual activity: No     Other Topics Concern    None     Social History Narrative    ** Merged History Encounter **          Visit Vitals    /60    Pulse 77    Temp 97.7 °F (36.5 °C) (Oral)    Resp 14    Ht 5' (1.524 m)    Wt 127 lb 6.4 oz (57.8 kg)    SpO2 97%    BMI 24.88 kg/m2     Ear canals and tympanic membranes appear normal.  Oral cavity reveals no lesions. Neck reveals no adenopathy or thyromegaly. Carotids are 2+ without bruits. No JVD or HJR. Lungs are clear to percussion. Excellent inspiratory effort good breath sounds no wheezing or crackles. Heart reveals an irregularly irregular rhythm with no murmur gallop click or rub. Apical impulse is not palpable. Abdomen is soft and nontender with no hepatosplenomegaly or masses and no bruits. Extremities reveal no clubbing or cyanosis. She has 1+ left ankle edema with some slight tenderness but no redness and only trace edema around the right ankle. Pulses are 2+.   Feet reveal no deformities ulcerations or calluses. Normal sensation to monofilament testing. Breasts reveal no masses no skin or nipple abnormalities and no axillary adenopathy. Results for orders placed or performed during the hospital encounter of 08/13/18   MICROALBUMIN, UR, RAND W/ MICROALB/CREAT RATIO   Result Value Ref Range    Microalbumin,urine random 2.30 0 - 3.0 MG/DL    Creatinine, urine 27.21 (L) 30 - 125 mg/dL    Microalbumin/Creat ratio (mg/g creat) 85 (H) 0 - 30 mg/g     Other laboratory includes fasting glucose 99 hemoglobin A1c 5.7 hemoglobin 12.3 creatinine 1.18 estimated creatinine clearance 43 where it was greater than 66 months ago. Assessment: #1. ASHD asymptomatic. She will continue aspirin 81 mg daily and carvedilol 12.5 mg twice a day. #2.  Mild dyslipidemia doing very well, she will continue lovastatin 20 mg each evening. #3.  Renal function is not as good as it was but creatinine is up minimally over the last 6 months and probably not a major change in kidney function. We will recheck that in the near future. #4.  Some edema for the last 3 days but no good evidence of heart failure, we will check proBNP now and because of the edema we will stop amlodipine, her blood pressures are well controlled. She will continue hydrochlorothiazide 12.5 mg daily. #5.  Impaired fasting glucose but never diabetic, unclear how she got that labile, back in the 80s she was feeling weak but certainly was because of high sugar. I will delete the label of diabetes but unfortunately this defective medical record system does not allow me to correct diagnosis. Follow-up 1 month to be sure everything has stabilized particularly that her blood pressure remains good and we probably will check BMP at that time to recheck renal function. Barry Brewer MD FACP    Please note: This document has been produced using voice recognition software.  Unrecognized errors in transcription may be present.

## 2018-08-24 ENCOUNTER — HOSPITAL ENCOUNTER (INPATIENT)
Age: 83
LOS: 7 days | Discharge: NURSING, REHAB OR LONG-TERM CARE | DRG: 064 | End: 2018-08-31
Attending: EMERGENCY MEDICINE | Admitting: INTERNAL MEDICINE
Payer: MEDICARE

## 2018-08-24 ENCOUNTER — APPOINTMENT (OUTPATIENT)
Dept: GENERAL RADIOLOGY | Age: 83
DRG: 064 | End: 2018-08-24
Attending: EMERGENCY MEDICINE
Payer: MEDICARE

## 2018-08-24 ENCOUNTER — APPOINTMENT (OUTPATIENT)
Dept: CT IMAGING | Age: 83
DRG: 064 | End: 2018-08-24
Attending: EMERGENCY MEDICINE
Payer: MEDICARE

## 2018-08-24 DIAGNOSIS — N30.00 ACUTE CYSTITIS WITHOUT HEMATURIA: Primary | ICD-10-CM

## 2018-08-24 DIAGNOSIS — I63.9 ACUTE CVA (CEREBROVASCULAR ACCIDENT) (HCC): ICD-10-CM

## 2018-08-24 DIAGNOSIS — R41.0 CONFUSION WITH NONFOCAL NEUROLOGICAL EXAMINATION: ICD-10-CM
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
## 2018-08-24 DIAGNOSIS — I50.9 ACUTE ON CHRONIC CONGESTIVE HEART FAILURE, UNSPECIFIED HEART FAILURE TYPE (HCC): ICD-10-CM

## 2018-08-24 PROBLEM — I10 ESSENTIAL HYPERTENSION: Status: ACTIVE | Noted: 2018-08-24

## 2018-08-24 PROBLEM — E86.0 DEHYDRATION: Status: ACTIVE | Noted: 2018-08-24

## 2018-08-24 PROBLEM — N39.0 UTI (URINARY TRACT INFECTION): Status: ACTIVE | Noted: 2018-08-24

## 2018-08-24 LAB
ALBUMIN SERPL-MCNC: 3.6 G/DL (ref 3.4–5)
ALBUMIN/GLOB SERPL: 0.9 {RATIO} (ref 0.8–1.7)
ALP SERPL-CCNC: 67 U/L (ref 45–117)
ALT SERPL-CCNC: 41 U/L (ref 13–56)
ANION GAP SERPL CALC-SCNC: 13 MMOL/L (ref 3–18)
APPEARANCE UR: ABNORMAL
AST SERPL-CCNC: 42 U/L (ref 15–37)
BACTERIA URNS QL MICRO: ABNORMAL /HPF
BASOPHILS # BLD: 0 K/UL (ref 0–0.1)
BASOPHILS NFR BLD: 0 % (ref 0–2)
BILIRUB DIRECT SERPL-MCNC: 0.3 MG/DL (ref 0–0.2)
BILIRUB SERPL-MCNC: 1.4 MG/DL (ref 0.2–1)
BILIRUB UR QL: NEGATIVE
BNP SERPL-MCNC: 3869 PG/ML (ref 0–1800)
BUN SERPL-MCNC: 39 MG/DL (ref 7–18)
BUN/CREAT SERPL: 36 (ref 12–20)
CALCIUM SERPL-MCNC: 9.2 MG/DL (ref 8.5–10.1)
CHLORIDE SERPL-SCNC: 102 MMOL/L (ref 100–108)
CK MB CFR SERPL CALC: 2.4 % (ref 0–4)
CK MB SERPL-MCNC: 4.7 NG/ML (ref 5–25)
CK SERPL-CCNC: 194 U/L (ref 26–192)
CO2 SERPL-SCNC: 25 MMOL/L (ref 21–32)
COLOR UR: YELLOW
CREAT SERPL-MCNC: 1.07 MG/DL (ref 0.6–1.3)
DIFFERENTIAL METHOD BLD: ABNORMAL
EOSINOPHIL # BLD: 0 K/UL (ref 0–0.4)
EOSINOPHIL NFR BLD: 0 % (ref 0–5)
EPITH CASTS URNS QL MICRO: ABNORMAL /LPF (ref 0–5)
ERYTHROCYTE [DISTWIDTH] IN BLOOD BY AUTOMATED COUNT: 12.9 % (ref 11.6–14.5)
GLOBULIN SER CALC-MCNC: 4 G/DL (ref 2–4)
GLUCOSE BLD STRIP.AUTO-MCNC: 139 MG/DL (ref 70–110)
GLUCOSE SERPL-MCNC: 138 MG/DL (ref 74–99)
GLUCOSE UR STRIP.AUTO-MCNC: NEGATIVE MG/DL
HCT VFR BLD AUTO: 39 % (ref 35–45)
HGB BLD-MCNC: 13.5 G/DL (ref 12–16)
HGB UR QL STRIP: ABNORMAL
INR PPP: 1.1 (ref 0.8–1.2)
KETONES UR QL STRIP.AUTO: 40 MG/DL
LEUKOCYTE ESTERASE UR QL STRIP.AUTO: ABNORMAL
LYMPHOCYTES # BLD: 0.7 K/UL (ref 0.9–3.6)
LYMPHOCYTES NFR BLD: 6 % (ref 21–52)
MCH RBC QN AUTO: 32.4 PG (ref 24–34)
MCHC RBC AUTO-ENTMCNC: 34.6 G/DL (ref 31–37)
MCV RBC AUTO: 93.5 FL (ref 74–97)
MONOCYTES # BLD: 0.4 K/UL (ref 0.05–1.2)
MONOCYTES NFR BLD: 4 % (ref 3–10)
NEUTS SEG # BLD: 9.1 K/UL (ref 1.8–8)
NEUTS SEG NFR BLD: 90 % (ref 40–73)
NITRITE UR QL STRIP.AUTO: POSITIVE
PH UR STRIP: 5.5 [PH] (ref 5–8)
PLATELET # BLD AUTO: 167 K/UL (ref 135–420)
PMV BLD AUTO: 9.9 FL (ref 9.2–11.8)
POTASSIUM SERPL-SCNC: 4 MMOL/L (ref 3.5–5.5)
PROT SERPL-MCNC: 7.6 G/DL (ref 6.4–8.2)
PROT UR STRIP-MCNC: 30 MG/DL
PROTHROMBIN TIME: 14.1 SEC (ref 11.5–15.2)
RBC # BLD AUTO: 4.17 M/UL (ref 4.2–5.3)
RBC #/AREA URNS HPF: ABNORMAL /HPF (ref 0–5)
SODIUM SERPL-SCNC: 140 MMOL/L (ref 136–145)
SP GR UR REFRACTOMETRY: 1.01 (ref 1–1.03)
TROPONIN I SERPL-MCNC: 0.03 NG/ML (ref 0–0.04)
UROBILINOGEN UR QL STRIP.AUTO: 0.2 EU/DL (ref 0.2–1)
WBC # BLD AUTO: 10.1 K/UL (ref 4.6–13.2)
WBC URNS QL MICRO: ABNORMAL /HPF (ref 0–4)

## 2018-08-24 PROCEDURE — 77030038269 HC DRN EXT URIN PURWCK BARD -A

## 2018-08-24 PROCEDURE — 80048 BASIC METABOLIC PNL TOTAL CA: CPT | Performed by: EMERGENCY MEDICINE

## 2018-08-24 PROCEDURE — 85025 COMPLETE CBC W/AUTO DIFF WBC: CPT | Performed by: EMERGENCY MEDICINE

## 2018-08-24 PROCEDURE — 71046 X-RAY EXAM CHEST 2 VIEWS: CPT

## 2018-08-24 PROCEDURE — 74011000250 HC RX REV CODE- 250: Performed by: INTERNAL MEDICINE

## 2018-08-24 PROCEDURE — 85610 PROTHROMBIN TIME: CPT | Performed by: EMERGENCY MEDICINE

## 2018-08-24 PROCEDURE — 83880 ASSAY OF NATRIURETIC PEPTIDE: CPT | Performed by: EMERGENCY MEDICINE

## 2018-08-24 PROCEDURE — 82550 ASSAY OF CK (CPK): CPT | Performed by: EMERGENCY MEDICINE

## 2018-08-24 PROCEDURE — 74011250636 HC RX REV CODE- 250/636: Performed by: INTERNAL MEDICINE

## 2018-08-24 PROCEDURE — 99285 EMERGENCY DEPT VISIT HI MDM: CPT

## 2018-08-24 PROCEDURE — 82962 GLUCOSE BLOOD TEST: CPT

## 2018-08-24 PROCEDURE — 93005 ELECTROCARDIOGRAM TRACING: CPT

## 2018-08-24 PROCEDURE — 70450 CT HEAD/BRAIN W/O DYE: CPT

## 2018-08-24 PROCEDURE — 80076 HEPATIC FUNCTION PANEL: CPT | Performed by: INTERNAL MEDICINE

## 2018-08-24 PROCEDURE — 74011250637 HC RX REV CODE- 250/637: Performed by: INTERNAL MEDICINE

## 2018-08-24 PROCEDURE — 96374 THER/PROPH/DIAG INJ IV PUSH: CPT

## 2018-08-24 PROCEDURE — 81001 URINALYSIS AUTO W/SCOPE: CPT | Performed by: EMERGENCY MEDICINE

## 2018-08-24 PROCEDURE — 74011250637 HC RX REV CODE- 250/637: Performed by: EMERGENCY MEDICINE

## 2018-08-24 PROCEDURE — 65660000000 HC RM CCU STEPDOWN

## 2018-08-24 PROCEDURE — 74011250636 HC RX REV CODE- 250/636: Performed by: EMERGENCY MEDICINE

## 2018-08-24 RX ORDER — FUROSEMIDE 20 MG/1
TABLET ORAL
Qty: 90 TAB | Refills: 0 | Status: SHIPPED | OUTPATIENT
Start: 2018-08-24 | End: 2020-06-27

## 2018-08-24 RX ORDER — ASPIRIN 325 MG
325 TABLET ORAL DAILY
Status: DISCONTINUED | OUTPATIENT
Start: 2018-08-25 | End: 2018-08-31 | Stop reason: HOSPADM

## 2018-08-24 RX ORDER — ENOXAPARIN SODIUM 100 MG/ML
30 INJECTION SUBCUTANEOUS EVERY 24 HOURS
Status: DISCONTINUED | OUTPATIENT
Start: 2018-08-24 | End: 2018-08-26

## 2018-08-24 RX ORDER — LABETALOL HYDROCHLORIDE 5 MG/ML
5 INJECTION, SOLUTION INTRAVENOUS
Status: DISCONTINUED | OUTPATIENT
Start: 2018-08-24 | End: 2018-08-31 | Stop reason: HOSPADM

## 2018-08-24 RX ORDER — GUAIFENESIN 100 MG/5ML
324 LIQUID (ML) ORAL
Status: COMPLETED | OUTPATIENT
Start: 2018-08-24 | End: 2018-08-24

## 2018-08-24 RX ORDER — FACIAL-BODY WIPES
10 EACH TOPICAL DAILY PRN
Status: DISCONTINUED | OUTPATIENT
Start: 2018-08-24 | End: 2018-08-31 | Stop reason: HOSPADM

## 2018-08-24 RX ORDER — FAMOTIDINE 20 MG/1
20 TABLET, FILM COATED ORAL EVERY EVENING
Status: DISCONTINUED | OUTPATIENT
Start: 2018-08-24 | End: 2018-08-31 | Stop reason: HOSPADM

## 2018-08-24 RX ORDER — SODIUM CHLORIDE 9 MG/ML
100 INJECTION, SOLUTION INTRAVENOUS CONTINUOUS
Status: DISCONTINUED | OUTPATIENT
Start: 2018-08-24 | End: 2018-08-25

## 2018-08-24 RX ORDER — SULFAMETHOXAZOLE AND TRIMETHOPRIM 800; 160 MG/1; MG/1
1 TABLET ORAL ONCE
Status: COMPLETED | OUTPATIENT
Start: 2018-08-24 | End: 2018-08-24

## 2018-08-24 RX ORDER — SODIUM CHLORIDE 9 MG/ML
1000 INJECTION, SOLUTION INTRAVENOUS ONCE
Status: COMPLETED | OUTPATIENT
Start: 2018-08-24 | End: 2018-08-24

## 2018-08-24 RX ORDER — SODIUM CHLORIDE 0.9 % (FLUSH) 0.9 %
5-10 SYRINGE (ML) INJECTION EVERY 8 HOURS
Status: DISCONTINUED | OUTPATIENT
Start: 2018-08-24 | End: 2018-08-26

## 2018-08-24 RX ORDER — SODIUM CHLORIDE 0.9 % (FLUSH) 0.9 %
5-10 SYRINGE (ML) INJECTION AS NEEDED
Status: DISCONTINUED | OUTPATIENT
Start: 2018-08-24 | End: 2018-08-31 | Stop reason: HOSPADM

## 2018-08-24 RX ORDER — LOVASTATIN 20 MG/1
40 TABLET ORAL DAILY
Status: DISCONTINUED | OUTPATIENT
Start: 2018-08-25 | End: 2018-08-31 | Stop reason: HOSPADM

## 2018-08-24 RX ORDER — FUROSEMIDE 10 MG/ML
20 INJECTION INTRAMUSCULAR; INTRAVENOUS
Status: COMPLETED | OUTPATIENT
Start: 2018-08-24 | End: 2018-08-24

## 2018-08-24 RX ORDER — FAMOTIDINE 20 MG/1
20 TABLET, FILM COATED ORAL EVERY 12 HOURS
Status: DISCONTINUED | OUTPATIENT
Start: 2018-08-24 | End: 2018-08-24 | Stop reason: DRUGHIGH

## 2018-08-24 RX ADMIN — ASPIRIN 81 MG 324 MG: 81 TABLET ORAL at 20:24

## 2018-08-24 RX ADMIN — Medication 10 ML: at 22:48

## 2018-08-24 RX ADMIN — SODIUM CHLORIDE 100 ML/HR: 900 INJECTION, SOLUTION INTRAVENOUS at 22:47

## 2018-08-24 RX ADMIN — FUROSEMIDE 20 MG: 10 INJECTION, SOLUTION INTRAMUSCULAR; INTRAVENOUS at 20:24

## 2018-08-24 RX ADMIN — CEFTRIAXONE SODIUM 1 G: 1 INJECTION, POWDER, FOR SOLUTION INTRAMUSCULAR; INTRAVENOUS at 22:48

## 2018-08-24 RX ADMIN — FAMOTIDINE 20 MG: 20 TABLET ORAL at 22:48

## 2018-08-24 RX ADMIN — SULFAMETHOXAZOLE AND TRIMETHOPRIM 1 TABLET: 800; 160 TABLET ORAL at 20:21

## 2018-08-24 RX ADMIN — SODIUM CHLORIDE 1000 ML: 9 INJECTION, SOLUTION INTRAVENOUS at 21:40

## 2018-08-24 NOTE — ED NOTES
The patient was cleaned, linens changed, gown applied, then repositioned to comfort. Warm blankets provided.

## 2018-08-24 NOTE — ED TRIAGE NOTES
Per medic, \"her family found her on the floor between her bed and the dresser. They last saw her yesterday at approximately 5 pm.\"  The patient is currently awake, alert, oriented to person and place. She was incontinent of bowel and bladder.

## 2018-08-24 NOTE — PROGRESS NOTES
Please notify the patient that her blood test suggests that she has mild fluid overload in her lungs and I will call in a low-dose of Lasix that should work well with the low-dose diuretic she is already on to get rid of this excess fluid. I will see her back in a month as we planned.

## 2018-08-24 NOTE — ED PROVIDER NOTES
EMERGENCY DEPARTMENT HISTORY AND PHYSICAL EXAM    6:46 PM      Date: 8/24/2018  Patient Name: Anat Castillo    History of Presenting Illness     Chief Complaint   Patient presents with    Other         History Provided By: Patient and EMS    Chief Complaint: altered mental status, fall  Duration: 1 Days  Timing:  Acute  Location: n/a  Quality: n/a  Severity: N/A  Modifying Factors: laying on floor for unknown amount of time  Associated Symptoms: denies any other associated signs or symptoms      Additional History (Context): Aant Castillo is a 80 y.o. female with hypertension and A-Fib, CKD who presents with 1 day of acute onset altered mental status and a fall and found laying on floor for unknown amount of time. Per ems neighbors came over to mow her lawn and she did not answer the door so they forced entry and found her on the floor between her bed and dresser soiled with urine. Per family she was last known normal at 17:00 yesterday. Pt denies any pain other than chronic back pain and states she had been on the floor but cannot say for how long or how she ended up on the floor. No other concerns or symptoms at this time. PCP: Deshawn Coleman MD    Current Facility-Administered Medications   Medication Dose Route Frequency Provider Last Rate Last Dose    trimethoprim-sulfamethoxazole (BACTRIM DS, SEPTRA DS) 160-800 mg per tablet 1 Tab  1 Tab Oral ONCE Nik Ovalle MD        furosemide (LASIX) injection 20 mg  20 mg IntraVENous NOW Nik Ovalle MD        aspirin chewable tablet 324 mg  324 mg Oral NOW Nik Ovalle MD         Current Outpatient Prescriptions   Medication Sig Dispense Refill    furosemide (LASIX) 20 mg tablet 1 tablet by mouth daily 90 Tab 0    polyethylene glycol (MIRALAX) 17 gram packet Take 17 g by mouth daily.  promethazine (PHENERGAN) 25 mg tablet Take 1 Tab by mouth every six (6) hours as needed for Nausea.  30 Tab 0    hydroCHLOROthiazide (MICROZIDE) 12.5 mg capsule Take 1 Cap by mouth daily. 90 Cap 3    losartan (COZAAR) 50 mg tablet Take 1 Tab by mouth daily. 90 Tab 3    tolterodine (DETROL) 2 mg tablet Take 1 Tab by mouth two (2) times a day. 180 Tab 3    naproxen sodium (ALEVE) 220 mg cap Take  by mouth.  carvedilol (COREG) 12.5 mg tablet take 1 tablet by mouth twice a day 180 Tab 3    lovastatin (MEVACOR) 20 mg tablet Take 1 Tab by mouth daily. 90 Tab 3    multivitamin (ONE A DAY) tablet Take 1 Tab by mouth daily.  Lactobacillus acidophilus (ACIDOPHILUS) cap Take 2 Caps by mouth two (2) times a day.  omeprazole (PRILOSEC) 20 mg capsule Take 20 mg by mouth daily as needed.  ascorbic acid (VITAMIN C) 250 mg tablet Take 1 Tab by mouth two (2) times daily (with meals). [Request refills from PCP (Dr. Jaylene Chapman)] 30 Tab 0    cholecalciferol (VITAMIN D3) 1,000 unit tablet Take 2 Tabs by mouth daily. [Request refills from PCP (Dr. Jaylene Chapman)] 30 Tab 0    aspirin 81 mg tablet Take 81 mg by mouth daily.  DOCOSAHEXANOIC ACID/EPA (FISH OIL PO) Take 3 Tabs by mouth daily. Past History     Past Medical History:  Past Medical History:   Diagnosis Date    Allergic rhinitis     Anemia     Asymptomatic carotid bruit     asymptomatic    Atrial fibrillation ([facility] [address] 04/2017    Started on Xarelto    Atrial fibrillation ([facility]     Cardiac cath 12/15/2010    pRCA 30%. LM patent. LAD 25%. CX 30%. LVEDP 10 mmHg. EF 60%. Mild AS.  Cardiac nuclear imaging test, low to mod risk 02/22/2016    Low to intermediate risk. Sm reversible inferoapical defect may be a sm area of ischemia. No prior infarction. EF >75%. Neg EKG on pharm stress test.    Carotid duplex 02/10/2014    Mild 1-49% bilateral ICA stenosis.      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 Nelly Hoover Hypertension     Lung collapse Dec 2011    Collapse of left upper lobe    Mild aortic stenosis 8/22/2011    Osteoarthritis     Osteoporosis     Positive PPD        Past Surgical History:  Past Surgical History:   Procedure Laterality Date    HX CATARACT REMOVAL      HX COLONOSCOPY      HX HEART CATHETERIZATION  12/15/10    negative    HX HYSTERECTOMY      HX KNEE ARTHROSCOPY      HX PTCA  3/1995    of the LAD; has had no recurrent angina since    HX TONSILLECTOMY      IN BRONCHOSCOPY BRONCHIAL/ENDOBRNCL BX 1+ SITES  9/25/2012            Family History:  Family History   Problem Relation Age of Onset    Heart Disease Mother      History of CHF    Heart Disease Father     Heart Attack Father     Heart Disease Sister     Cancer Brother      non-Hodgkin lymphoma    Cancer Brother      liver cancer    Heart Disease Brother     Stroke Brother     Parkinsonism Sister     Parkinsonism Brother        Social History:  Social History   Substance Use Topics    Smoking status: Never Smoker    Smokeless tobacco: Never Used      Comment: extensive secondhand smoke exposure through job    Alcohol use No       Allergies:   Allergies   Allergen Reactions    Bactrim [Sulfamethoprim Ds] Nausea and Vomiting    Vicodin [Hydrocodone-Acetaminophen] Nausea Only    Atenolol Other (comments)     interolance    Atenolol Other (comments)     Black out    Codeine Other (comments)     headache    Erythromycin Unknown (comments)    Erythromycin Hives and Itching    Other Medication Not Reported This Time     Refined sugar    Pcn [Penicillins] Hives    Penicillin G Hives and Itching         Review of Systems       Review of Systems   Constitutional: Negative for chills and fatigue. HENT: Negative for rhinorrhea and sore throat. Respiratory: Negative for cough and shortness of breath. Cardiovascular: Negative for chest pain and palpitations. Gastrointestinal: Negative for abdominal pain, diarrhea, nausea and vomiting. Genitourinary: Negative for difficulty urinating and dysuria. Musculoskeletal: Positive for back pain (chronic). Neurological: Negative for syncope and light-headedness. All other systems reviewed and are negative. Physical Exam     Visit Vitals    /69    Pulse (!) 105    Temp 96.4 °F (35.8 °C)    Resp 16    Ht 5' (1.524 m)    Wt 57.6 kg (127 lb)    SpO2 95%    BMI 24.8 kg/m2         Physical Exam   Constitutional: She appears well-developed and well-nourished. No distress. HENT:   Head: Normocephalic and atraumatic. Eyes: Pupils are equal, round, and reactive to light. Neck: Normal range of motion. Neck supple. Cardiovascular: Normal rate, regular rhythm and normal heart sounds. Pulmonary/Chest: Effort normal and breath sounds normal. She has no wheezes. She has no rales. Abdominal: Soft. Bowel sounds are normal. She exhibits no distension. There is no tenderness. Musculoskeletal: Normal range of motion. Lymphadenopathy:     She has no cervical adenopathy. Neurological: She is alert. Oriented x 2 person and place   Skin: Skin is warm and dry. She is not diaphoretic. Nursing note and vitals reviewed.         Diagnostic Study Results     Labs -  Recent Results (from the past 12 hour(s))   URINALYSIS W/ RFLX MICROSCOPIC    Collection Time: 08/24/18  6:55 PM   Result Value Ref Range    Color YELLOW      Appearance CLOUDY      Specific gravity 1.015 1.005 - 1.030      pH (UA) 5.5 5.0 - 8.0      Protein 30 (A) NEG mg/dL Glucose NEGATIVE  NEG mg/dL    Ketone 40 (A) NEG mg/dL    Bilirubin NEGATIVE  NEG      Blood SMALL (A) NEG      Urobilinogen 0.2 0.2 - 1.0 EU/dL    Nitrites POSITIVE (A) NEG      Leukocyte Esterase LARGE (A) NEG     URINE MICROSCOPIC ONLY    Collection Time: 08/24/18  6:55 PM   Result Value Ref Range    WBC 40 to 45 0 - 4 /hpf    RBC 3 to 5 0 - 5 /hpf    Epithelial cells 2+ 0 - 5 /lpf    Bacteria 4+ (A) NEG /hpf   CBC WITH AUTOMATED DIFF    Collection Time: 08/24/18  7:00 PM   Result Value Ref Range    WBC 10.1 4.6 - 13.2 K/uL    RBC 4.17 (L) 4.20 - 5.30 M/uL    HGB 13.5 12.0 - 16.0 g/dL    HCT 39.0 35.0 - 45.0 %    MCV 93.5 74.0 - 97.0 FL    MCH 32.4 24.0 - 34.0 PG    MCHC 34.6 31.0 - 37.0 g/dL    RDW 12.9 11.6 - 14.5 %    PLATELET 076 179 - 782 K/uL    MPV 9.9 9.2 - 11.8 FL    NEUTROPHILS 90 (H) 40 - 73 %    LYMPHOCYTES 6 (L) 21 - 52 %    MONOCYTES 4 3 - 10 %    EOSINOPHILS 0 0 - 5 %    BASOPHILS 0 0 - 2 %    ABS. NEUTROPHILS 9.1 (H) 1.8 - 8.0 K/UL    ABS. LYMPHOCYTES 0.7 (L) 0.9 - 3.6 K/UL    ABS. MONOCYTES 0.4 0.05 - 1.2 K/UL    ABS. EOSINOPHILS 0.0 0.0 - 0.4 K/UL    ABS.  BASOPHILS 0.0 0.0 - 0.1 K/UL    DF AUTOMATED     PROTHROMBIN TIME + INR    Collection Time: 08/24/18  7:00 PM   Result Value Ref Range    Prothrombin time 14.1 11.5 - 15.2 sec    INR 1.1 0.8 - 1.2     METABOLIC PANEL, BASIC    Collection Time: 08/24/18  7:00 PM   Result Value Ref Range    Sodium 140 136 - 145 mmol/L    Potassium 4.0 3.5 - 5.5 mmol/L    Chloride 102 100 - 108 mmol/L    CO2 25 21 - 32 mmol/L    Anion gap 13 3.0 - 18 mmol/L    Glucose 138 (H) 74 - 99 mg/dL    BUN 39 (H) 7.0 - 18 MG/DL    Creatinine 1.07 0.6 - 1.3 MG/DL    BUN/Creatinine ratio 36 (H) 12 - 20      GFR est AA 59 (L) >60 ml/min/1.73m2    GFR est non-AA 48 (L) >60 ml/min/1.73m2    Calcium 9.2 8.5 - 10.1 MG/DL   NT-PRO BNP    Collection Time: 08/24/18  7:00 PM   Result Value Ref Range    NT pro-BNP 3869 (H) 0 - 1800 PG/ML   CARDIAC PANEL,(CK, CKMB & TROPONIN) Collection Time: 08/24/18  7:00 PM   Result Value Ref Range     (H) 26 - 192 U/L    CK - MB 4.7 (H) <3.6 ng/ml    CK-MB Index 2.4 0.0 - 4.0 %    Troponin-I, Qt. 0.03 0.0 - 0.045 NG/ML   EKG, 12 LEAD, INITIAL    Collection Time: 08/24/18  7:41 PM   Result Value Ref Range    Ventricular Rate 109 BPM    Atrial Rate 100 BPM    QRS Duration 68 ms    Q-T Interval 334 ms    QTC Calculation (Bezet) 449 ms    Calculated R Axis -15 degrees    Calculated T Axis -67 degrees    Diagnosis       Atrial fibrillation with rapid ventricular response with premature   ventricular or aberrantly conducted complexes  Nonspecific ST and T wave abnormality  Abnormal ECG  When compared with ECG of 02-MAR-2017 10:39,  Questionable change in QRS axis  Nonspecific T wave abnormality, worse in Inferior leads         Radiologic Studies -   CT HEAD WO CONT   Final Result      XR CHEST PA LAT    (Results Pending)         Medical Decision Making   I am the first provider for this patient. I reviewed the vital signs, available nursing notes, past medical history, past surgical history, family history and social history. Vital Signs-Reviewed the patient's vital signs. Pulse Oximetry Analysis -  98% on room air (Interpretation)    Cardiac Monitor:  Rate: 92  Rhythm:  Atrial Fibrillation     EKG: Interpreted by the EP. Time Interpreted: 19:46   Rate: 109   Rhythm: Atrial Fibrillation    Interpretation: no acute st changes   Comparison:     Records Reviewed: Nursing Notes and Old Medical Records (Time of Review: 6:46 PM)    ED Course: Progress Notes, Reevaluation, and Consults:  Consult:  Discussed care with Dr Héctor Haynes, Specialty: Hospitalist  Standard discussion; including history of patients chief complaint, available diagnostic results, and treatment course. Agrees to admit. Requests the neurology be consulted.   7:57 PM, 8/24/2018       Provider Notes (Medical Decision Making): Ct shows subacute stroke, also has mild CHF excerebration, UTI, and syncope/fall. Will treat UTI with ABX and give Asprin for stroke and will admit to hospitalist.     For Hospitalized Patients:    1. Hospitalization Decision Time:  The decision to hospitalize the patient was made by Dr. Brian Mathews at 7:47 PM on 8/24/2018    2. Aspirin: Aspirin was given    Diagnosis     Clinical Impression:   1. Acute cystitis without hematuria    2. Acute CVA (cerebrovascular accident) ([facility] [address]    3. Acute on chronic congestive heart failure, unspecified heart failure type ([facility] [address]    4. Fall, initial encounter    5. Confusion with nonfocal neurological examination        Disposition: admit    Follow-up Information     None           Patient's Medications   Start Taking    No medications on file   Continue Taking    ASCORBIC ACID (VITAMIN C) 250 MG TABLET    Take 1 Tab by mouth two (2) times daily (with meals). [Request refills from PCP (Dr. Paola Chapman)]    ASPIRIN 81 MG TABLET    Take 81 mg by mouth daily. CARVEDILOL (COREG) 12.5 MG TABLET    take 1 tablet by mouth twice a day    CHOLECALCIFEROL (VITAMIN D3) 1,000 UNIT TABLET    Take 2 Tabs by mouth daily. [Request refills from PCP (Dr. Paola Chapman)]    DOCOSAHEXANOIC ACID/EPA (FISH OIL PO)    Take 3 Tabs by mouth daily. FUROSEMIDE (LASIX) 20 MG TABLET    1 tablet by mouth daily    HYDROCHLOROTHIAZIDE (MICROZIDE) 12.5 MG CAPSULE    Take 1 Cap by mouth daily. LACTOBACILLUS ACIDOPHILUS (ACIDOPHILUS) CAP    Take 2 Caps by mouth two (2) times a day. LOSARTAN (COZAAR) 50 MG TABLET    Take 1 Tab by mouth daily. LOVASTATIN (MEVACOR) 20 MG TABLET    Take 1 Tab by mouth daily. MULTIVITAMIN (ONE A DAY) TABLET    Take 1 Tab by mouth daily. NAPROXEN SODIUM (ALEVE) 220 MG CAP    Take  by mouth. OMEPRAZOLE (PRILOSEC) 20 MG CAPSULE    Take 20 mg by mouth daily as needed. POLYETHYLENE GLYCOL (MIRALAX) 17 GRAM PACKET    Take 17 g by mouth daily.     PROMETHAZINE (PHENERGAN) 25 MG TABLET    Take 1 Tab by mouth every six (6) hours as needed for Nausea. TOLTERODINE (DETROL) 2 MG TABLET    Take 1 Tab by mouth two (2) times a day. These Medications have changed    No medications on file   Stop Taking    No medications on file     _______________________________    Attestations:  [facility] acting as a scribe for and in the presence of Dominick Mensah MD      August 24, 2018 at 6:46 PM       Provider Attestation:      I personally performed the services described in the documentation, reviewed the documentation, as recorded by the scribe in my presence, and it accurately and completely records my words and actions.  August 24, 2018 at 6:46 PM - Dominick Mensah MD    _______________________________

## 2018-08-25 ENCOUNTER — APPOINTMENT (OUTPATIENT)
Dept: MRI IMAGING | Age: 83
DRG: 064 | End: 2018-08-25
Attending: INTERNAL MEDICINE
Payer: MEDICARE

## 2018-08-25 PROBLEM — G93.6 VASOGENIC BRAIN EDEMA (HCC): Status: ACTIVE | Noted: 2018-08-25

## 2018-08-25 LAB
ALBUMIN SERPL-MCNC: 2.9 G/DL (ref 3.4–5)
ALBUMIN/GLOB SERPL: 0.9 {RATIO} (ref 0.8–1.7)
ALP SERPL-CCNC: 51 U/L (ref 45–117)
ALT SERPL-CCNC: 33 U/L (ref 13–56)
ANION GAP SERPL CALC-SCNC: 11 MMOL/L (ref 3–18)
AST SERPL-CCNC: 30 U/L (ref 15–37)
ATRIAL RATE: 100 BPM
BASOPHILS # BLD: 0 K/UL (ref 0–0.1)
BASOPHILS NFR BLD: 0 % (ref 0–2)
BILIRUB SERPL-MCNC: 0.7 MG/DL (ref 0.2–1)
BUN SERPL-MCNC: 37 MG/DL (ref 7–18)
BUN/CREAT SERPL: 35 (ref 12–20)
CALCIUM SERPL-MCNC: 8 MG/DL (ref 8.5–10.1)
CALCULATED R AXIS, ECG10: -15 DEGREES
CALCULATED T AXIS, ECG11: -67 DEGREES
CHLORIDE SERPL-SCNC: 107 MMOL/L (ref 100–108)
CHOLEST SERPL-MCNC: 119 MG/DL
CO2 SERPL-SCNC: 24 MMOL/L (ref 21–32)
CREAT SERPL-MCNC: 1.07 MG/DL (ref 0.6–1.3)
DIAGNOSIS, 93000: NORMAL
DIFFERENTIAL METHOD BLD: ABNORMAL
EOSINOPHIL # BLD: 0 K/UL (ref 0–0.4)
EOSINOPHIL NFR BLD: 0 % (ref 0–5)
ERYTHROCYTE [DISTWIDTH] IN BLOOD BY AUTOMATED COUNT: 13 % (ref 11.6–14.5)
EST. AVERAGE GLUCOSE BLD GHB EST-MCNC: 117 MG/DL
GLOBULIN SER CALC-MCNC: 3.3 G/DL (ref 2–4)
GLUCOSE BLD STRIP.AUTO-MCNC: 140 MG/DL (ref 70–110)
GLUCOSE BLD STRIP.AUTO-MCNC: 219 MG/DL (ref 70–110)
GLUCOSE BLD STRIP.AUTO-MCNC: 225 MG/DL (ref 70–110)
GLUCOSE BLD STRIP.AUTO-MCNC: 250 MG/DL (ref 70–110)
GLUCOSE SERPL-MCNC: 126 MG/DL (ref 74–99)
HBA1C MFR BLD: 5.7 % (ref 4.2–5.6)
HCT VFR BLD AUTO: 34.2 % (ref 35–45)
HDLC SERPL-MCNC: 73 MG/DL (ref 40–60)
HDLC SERPL: 1.6 {RATIO} (ref 0–5)
HGB BLD-MCNC: 11.8 G/DL (ref 12–16)
LDLC SERPL CALC-MCNC: 36 MG/DL (ref 0–100)
LIPID PROFILE,FLP: ABNORMAL
LYMPHOCYTES # BLD: 0.8 K/UL (ref 0.9–3.6)
LYMPHOCYTES NFR BLD: 12 % (ref 21–52)
MCH RBC QN AUTO: 31.7 PG (ref 24–34)
MCHC RBC AUTO-ENTMCNC: 34.5 G/DL (ref 31–37)
MCV RBC AUTO: 91.9 FL (ref 74–97)
MONOCYTES # BLD: 0.7 K/UL (ref 0.05–1.2)
MONOCYTES NFR BLD: 10 % (ref 3–10)
NEUTS SEG # BLD: 5 K/UL (ref 1.8–8)
NEUTS SEG NFR BLD: 78 % (ref 40–73)
PLATELET # BLD AUTO: 150 K/UL (ref 135–420)
PMV BLD AUTO: 9.4 FL (ref 9.2–11.8)
POTASSIUM SERPL-SCNC: 3.3 MMOL/L (ref 3.5–5.5)
PROT SERPL-MCNC: 6.2 G/DL (ref 6.4–8.2)
Q-T INTERVAL, ECG07: 334 MS
QRS DURATION, ECG06: 68 MS
QTC CALCULATION (BEZET), ECG08: 449 MS
RBC # BLD AUTO: 3.72 M/UL (ref 4.2–5.3)
SODIUM SERPL-SCNC: 142 MMOL/L (ref 136–145)
TRIGL SERPL-MCNC: 50 MG/DL (ref ?–150)
TSH SERPL DL<=0.05 MIU/L-ACNC: 0.61 UIU/ML (ref 0.36–3.74)
VENTRICULAR RATE, ECG03: 109 BPM
VLDLC SERPL CALC-MCNC: 10 MG/DL
WBC # BLD AUTO: 6.4 K/UL (ref 4.6–13.2)

## 2018-08-25 PROCEDURE — 74011636320 HC RX REV CODE- 636/320: Performed by: INTERNAL MEDICINE

## 2018-08-25 PROCEDURE — 85025 COMPLETE CBC W/AUTO DIFF WBC: CPT | Performed by: INTERNAL MEDICINE

## 2018-08-25 PROCEDURE — 92610 EVALUATE SWALLOWING FUNCTION: CPT

## 2018-08-25 PROCEDURE — 65660000000 HC RM CCU STEPDOWN

## 2018-08-25 PROCEDURE — 74011000258 HC RX REV CODE- 258: Performed by: INTERNAL MEDICINE

## 2018-08-25 PROCEDURE — 97530 THERAPEUTIC ACTIVITIES: CPT

## 2018-08-25 PROCEDURE — 84443 ASSAY THYROID STIM HORMONE: CPT | Performed by: INTERNAL MEDICINE

## 2018-08-25 PROCEDURE — 70553 MRI BRAIN STEM W/O & W/DYE: CPT

## 2018-08-25 PROCEDURE — 74011250636 HC RX REV CODE- 250/636: Performed by: INTERNAL MEDICINE

## 2018-08-25 PROCEDURE — A9575 INJ GADOTERATE MEGLUMI 0.1ML: HCPCS | Performed by: INTERNAL MEDICINE

## 2018-08-25 PROCEDURE — 80061 LIPID PANEL: CPT | Performed by: INTERNAL MEDICINE

## 2018-08-25 PROCEDURE — 97161 PT EVAL LOW COMPLEX 20 MIN: CPT

## 2018-08-25 PROCEDURE — 77030038269 HC DRN EXT URIN PURWCK BARD -A

## 2018-08-25 PROCEDURE — 74011250637 HC RX REV CODE- 250/637: Performed by: INTERNAL MEDICINE

## 2018-08-25 PROCEDURE — 80053 COMPREHEN METABOLIC PANEL: CPT | Performed by: INTERNAL MEDICINE

## 2018-08-25 PROCEDURE — 36415 COLL VENOUS BLD VENIPUNCTURE: CPT | Performed by: INTERNAL MEDICINE

## 2018-08-25 PROCEDURE — 70544 MR ANGIOGRAPHY HEAD W/O DYE: CPT

## 2018-08-25 PROCEDURE — 83036 HEMOGLOBIN GLYCOSYLATED A1C: CPT | Performed by: INTERNAL MEDICINE

## 2018-08-25 PROCEDURE — 97165 OT EVAL LOW COMPLEX 30 MIN: CPT

## 2018-08-25 PROCEDURE — 82962 GLUCOSE BLOOD TEST: CPT

## 2018-08-25 PROCEDURE — 74011000250 HC RX REV CODE- 250: Performed by: INTERNAL MEDICINE

## 2018-08-25 RX ORDER — DEXAMETHASONE SODIUM PHOSPHATE 4 MG/ML
10 INJECTION, SOLUTION INTRA-ARTICULAR; INTRALESIONAL; INTRAMUSCULAR; INTRAVENOUS; SOFT TISSUE EVERY 6 HOURS
Status: DISCONTINUED | OUTPATIENT
Start: 2018-08-25 | End: 2018-08-25

## 2018-08-25 RX ORDER — SODIUM CHLORIDE 450 MG/100ML
100 INJECTION, SOLUTION INTRAVENOUS CONTINUOUS
Status: DISCONTINUED | OUTPATIENT
Start: 2018-08-25 | End: 2018-08-27

## 2018-08-25 RX ORDER — DILTIAZEM HYDROCHLORIDE 30 MG/1
30 TABLET, FILM COATED ORAL
Status: DISCONTINUED | OUTPATIENT
Start: 2018-08-25 | End: 2018-08-31 | Stop reason: HOSPADM

## 2018-08-25 RX ORDER — POTASSIUM CHLORIDE 20 MEQ/1
40 TABLET, EXTENDED RELEASE ORAL EVERY 4 HOURS
Status: COMPLETED | OUTPATIENT
Start: 2018-08-25 | End: 2018-08-25

## 2018-08-25 RX ADMIN — DILTIAZEM HYDROCHLORIDE 30 MG: 30 TABLET, FILM COATED ORAL at 17:05

## 2018-08-25 RX ADMIN — LOVASTATIN 40 MG: 20 TABLET ORAL at 10:00

## 2018-08-25 RX ADMIN — SODIUM CHLORIDE 100 ML/HR: 450 INJECTION, SOLUTION INTRAVENOUS at 04:31

## 2018-08-25 RX ADMIN — GADOTERATE MEGLUMINE 10 ML: 376.9 INJECTION INTRAVENOUS at 09:28

## 2018-08-25 RX ADMIN — DILTIAZEM HYDROCHLORIDE 30 MG: 30 TABLET, FILM COATED ORAL at 10:55

## 2018-08-25 RX ADMIN — POTASSIUM CHLORIDE 40 MEQ: 20 TABLET, EXTENDED RELEASE ORAL at 04:31

## 2018-08-25 RX ADMIN — CEFTRIAXONE SODIUM 1 G: 1 INJECTION, POWDER, FOR SOLUTION INTRAMUSCULAR; INTRAVENOUS at 21:54

## 2018-08-25 RX ADMIN — Medication 10 ML: at 21:57

## 2018-08-25 RX ADMIN — ASPIRIN 325 MG ORAL TABLET 325 MG: 325 PILL ORAL at 10:00

## 2018-08-25 RX ADMIN — DEXAMETHASONE SODIUM PHOSPHATE 10 MG: 4 INJECTION, SOLUTION INTRAMUSCULAR; INTRAVENOUS at 04:31

## 2018-08-25 RX ADMIN — FAMOTIDINE 20 MG: 20 TABLET ORAL at 17:05

## 2018-08-25 RX ADMIN — ENOXAPARIN SODIUM 30 MG: 30 INJECTION SUBCUTANEOUS at 06:02

## 2018-08-25 RX ADMIN — POTASSIUM CHLORIDE 40 MEQ: 20 TABLET, EXTENDED RELEASE ORAL at 10:00

## 2018-08-25 NOTE — H&P
History & Physical    Patient: Smith Peck MRN: [id number]  CSN: [id number]    YOB: 1928  Age: 80 y.o. Sex: female      DOA: 8/24/2018    Chief Complaint:   Chief Complaint   Patient presents with    Other          HPI:     Smith Peck is a 80 y.o.  female who has PMH of HTN, HLD and mild MCI. Pt presented to ER after being food by her daughter in bed soiled in own urine. Pt was lethargic, confused and had slurred speech an showed difficulty leaving the bed and EMS was called   As per daughter, pt is usually active, walks on own with a rosa and drives. She tries to call her mom all day today but had no response and decided to come and check on her mom to find her as stated above. IN Er, CT head showed subacute infarct in the region of the left caudate and basal ganglia with vasogenic edema. Pt is seen and examined. Has LE weakness more on the left and looks dehydrated otherwise starting to feel better. States that she felt weak for the last 2 days and c/o burning in urination,  Denies CP/SOB/fever/abdominal pain and urinary Sx but continues to c/o weakness and dehydrated. Past Medical History:   Diagnosis Date    Allergic rhinitis     Anemia     Asymptomatic carotid bruit     asymptomatic    Atrial fibrillation ([facility] [address] 04/2017    Started on Xarelto    Atrial fibrillation ([facility] [address]     Cardiac cath 12/15/2010    pRCA 30%. LM patent. LAD 25%. CX 30%. LVEDP 10 mmHg. EF 60%. Mild AS.  Cardiac nuclear imaging test, low to mod risk 02/22/2016    Low to intermediate risk. Sm reversible inferoapical defect may be a sm area of ischemia. No prior infarction. EF >75%. Neg EKG on pharm stress test.    Carotid duplex 02/10/2014    Mild 1-49% bilateral ICA stenosis.      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 Marylee Harbor Hypertension     Lung collapse Dec 2011    Collapse of left upper lobe    Mild aortic stenosis 8/22/2011    Osteoarthritis     Osteoporosis     Positive PPD        Past Surgical History:   Procedure Laterality Date    HX CATARACT REMOVAL      HX COLONOSCOPY      HX HEART CATHETERIZATION  12/15/10    negative    HX HYSTERECTOMY      HX KNEE ARTHROSCOPY      HX PTCA  3/1995    of the LAD; has had no recurrent angina since    HX TONSILLECTOMY      [place] BRONCHOSCOPY BRONCHIAL/ENDOBRNCL BX 1+ SITES  9/25/2012            Family History   Problem Relation Age of Onset    Heart Disease Mother      History of CHF    Heart Disease Father     Heart Attack Father     Heart Disease Sister     Cancer Brother      non-Hodgkin lymphoma    Cancer Brother      liver cancer    Heart Disease Brother     Stroke Brother     Parkinsonism Sister     Parkinsonism Brother        Social History     Social History    Marital status:      Spouse name: N/A    Number of children: N/A    Years of education: N/A     Social History Main Topics    Smoking status: Never Smoker    Smokeless tobacco: Never Used      Comment: extensive secondhand smoke exposure through job    Alcohol use No    Drug use: No    Sexual activity: No     Other Topics Concern    None     Social History Narrative    ** Merged History Encounter **            Prior to Admission medications    Medication Sig Start Date End Date Taking?  Authorizing Provider   furosemide (LASIX) 20 mg tablet 1 tablet by mouth daily 8/24/18   Jose J Osborne MD   polyethylene glycol (MIRALAX) 17 gram packet Take 17 g by mouth daily. Historical Provider   promethazine (PHENERGAN) 25 mg tablet Take 1 Tab by mouth every six (6) hours as needed for Nausea. 7/17/18   Jose J Osborne MD   hydroCHLOROthiazide (MICROZIDE) 12.5 mg capsule Take 1 Cap by mouth daily. 7/16/18   Jose J Osborne MD   losartan (COZAAR) 50 mg tablet Take 1 Tab by mouth daily. 5/31/18   Jose J Osborne MD   tolterodine (DETROL) 2 mg tablet Take 1 Tab by mouth two (2) times a day. 1/15/18   Jose J Osborne MD   naproxen sodium (ALEVE) 220 mg cap Take  by mouth. Historical Provider   carvedilol (COREG) 12.5 mg tablet take 1 tablet by mouth twice a day 10/31/17   Vicki Patel NP   lovastatin (MEVACOR) 20 mg tablet Take 1 Tab by mouth daily. 8/31/17   Jose J Osborne MD   multivitamin (ONE A DAY) tablet Take 1 Tab by mouth daily. Historical Provider   Lactobacillus acidophilus (ACIDOPHILUS) cap Take 2 Caps by mouth two (2) times a day. Historical Provider   omeprazole (PRILOSEC) 20 mg capsule Take 20 mg by mouth daily as needed. Historical Provider   ascorbic acid (VITAMIN C) 250 mg tablet Take 1 Tab by mouth two (2) times daily (with meals). [Request refills from PCP (Dr. Shannan Chapman)] 6/17/14   Jose J Osborne MD   cholecalciferol (VITAMIN D3) 1,000 unit tablet Take 2 Tabs by mouth daily. [Request refills from PCP (Dr. Shannan Chapman)] 6/6/14   Mitzie Dance, MD   aspirin 81 mg tablet Take 81 mg by mouth daily. Historical Provider   DOCOSAHEXANOIC ACID/EPA (FISH OIL PO) Take 3 Tabs by mouth daily.  2/21/11   Historical Provider       Allergies   Allergen Reactions    Bactrim [Sulfamethoprim Ds] Nausea and Vomiting    Vicodin [Hydrocodone-Acetaminophen] Nausea Only    Atenolol Other (comments)     interolance    Atenolol Other (comments)     Black out    Codeine Other (comments) headache    Erythromycin Unknown (comments)    Erythromycin Hives and Itching    Other Medication Not Reported This Time     Refined sugar    Pcn [Penicillins] Hives    Penicillin G Hives and Itching         Review of Systems  GENERAL: Patient alert, awake and oriented times 3, able to communicate full sentences and not in distress. Looked dehydrated and confused at first   HEENT: No change in vision, no earache, tinnitus, sore throat or sinus congestion. NECK: No pain or stiffness. PULMONARY: No shortness of breath, cough or wheeze. Cardiovascular: no pnd / orthopnea, no CP  GASTROINTESTINAL: No abdominal pain, nausea, vomiting or diarrhea, melena or bright red blood per rectum. GENITOURINARY: +veurinary frequency, urgency, and  dysuria. MUSCULOSKELETAL: +ve joint and muscle pain, no back pain, no recent trauma. DERMATOLOGIC: No rash, no itching, no lesions. ENDOCRINE: No polyuria, polydipsia, no heat or cold intolerance. No recent change in weight. HEMATOLOGICAL: No anemia or easy bruising or bleeding. NEUROLOGIC: No headache, seizures, numbness, tingling o. +ve LE weakaness       Physical Exam:     Physical Exam:  Visit Vitals    /71 (BP 1 Location: Right arm, BP Patient Position: At rest)    Pulse 94    Temp 98.5 °F (36.9 °C)    Resp 18    Ht 5' (1.524 m)    Wt 56.7 kg (125 lb)    SpO2 95%    BMI 24.41 kg/m2      O2 Device: Room air    Temp (24hrs), Av.6 °F (36.4 °C), Min:96.4 °F (35.8 °C), Max:98.5 °F (36.9 °C)             General:  Alert, cooperative, no distress, appears stated age. Head: Normocephalic, without obvious abnormality, atraumatic. Eyes:  Conjunctivae/corneas clear. PERRL, EOMs intact. Nose: Nares normal. No drainage or sinus tenderness. Neck: Supple, symmetrical, trachea midline, no adenopathy, thyroid: no enlargement, no carotid bruit and no JVD. Lungs:   Clear to auscultation bilaterally.    Heart:  Regular rate and rhythm, S1, S2 normal.     Abdomen: Soft, non-tender. Bowel sounds normal.    Extremities: Extremities are weak  atraumatic, no cyanosis or edema. Has Knees pain    Pulses: 2+ and symmetric all extremities. Skin:  No rashes or lesions   Neurologic: AAOx3, No focal motor or sensory deficit. Decrease ROM in LE. Has finger to nose test difficulties so as heel to shin test b/l        Labs Reviewed:    Lab results reviewed. For significant abnormal values and values requiring intervention, see assessment and plan.   CT, CXR and EKG    Procedures/imaging: see electronic medical records for all procedures/Xrays and details which were not copied into this note but were reviewed prior to creation of Plan      Assessment/Plan     Principal Problem:    CVA (cerebral vascular accident) ([facility] [address] (8/24/2018)    Active Problems:    Confusion (8/24/2018)      UTI (urinary tract infection) (8/24/2018)      Essential hypertension (8/24/2018)      Dehydration (8/24/2018)      Vasogenic brain edema ([facility] [address] (8/25/2018)     Pt will be admitted to Neuro tele for Sub-acute CVA in the region of the left caudate and basal ganglia with vasogenic edema  Will d/w Neurology   Started Decadron 10 mg Q 6   MRI brain  2 D Echo   mg daily and statin  Permissive HTN>> Hold BP meds >> Pt is currently hypotensive and dehydrated >> will hydrate   Labetalol 5 mg IV PRN  Neuro consult is called   SLP evel and treat   Lipid panel    Acute UTI >> symptomatic >> ceftriaxone       DVT/GI Prophylaxis: Hep SQ    Plan of care is discussed in details with Patient/Family at bedside and agreed upon    Vivien Guaman MD  8/25/2018 9:47 PM

## 2018-08-25 NOTE — ED NOTES
TRANSFER - OUT REPORT:    Verbal report given to Kendell HURTADO(name) on Queta Capone  being transferred to [address] [place] [address](unit) for routine progression of care       Report consisted of patients Situation, Background, Assessment and   Recommendations(SBAR). Information from the following report(s) SBAR, Kardex, ED Summary and MAR was reviewed with the receiving nurse. Lines:   Peripheral IV 08/24/18 Left Forearm (Active)   Site Assessment Clean, dry, & intact 8/24/2018  7:03 PM   Phlebitis Assessment 0 8/24/2018  7:03 PM   Infiltration Assessment 0 8/24/2018  7:03 PM   Dressing Status Clean, dry, & intact 8/24/2018  7:03 PM   Dressing Type Transparent 8/24/2018  7:03 PM   Hub Color/Line Status Pink;Patent 8/24/2018  7:03 PM        Opportunity for questions and clarification was provided.       Patient transported with:   Monitor  Registered Nurse

## 2018-08-25 NOTE — PROGRESS NOTES
Problem: Self Care Deficits Care Plan (Adult)  Goal: *Acute Goals and Plan of Care (Insert Text)  Occupational Therapy Goals  Initiated 8/25/2018 within 7 day(s). 1.  Patient will perform grooming with modified independence progressing to standing at the sink as able  2. Patient will perform lower body dressing with modified independence. 3.  Patient will perform toilet transfer with contact guard assist.  4.  Patient will demonstrate 4/5 UB strength in preparation for her participation in her self care routine  Occupational Therapy EVALUATION    Patient: Kaitlin Cuellar (43 y.o. female)  Date: 8/25/2018  Primary Diagnosis: CVA (cerebral vascular accident) [facility])  UTI (urinary tract infection)  Confusion  Precautions: fall      ASSESSMENT :  Based on the objective data described below, the patient presents with generalized weakness, decreased functional mobility and decreased cognition which limits her independence and her safety. Patient will benefit from skilled intervention to address the above impairments.   Patients rehabilitation potential is considered to be Good  Factors which may influence rehabilitation potential include:   [x]             None noted  []             Mental ability/status  []             Medical condition  []             Home/family situation and support systems  []             Safety awareness  []             Pain tolerance/management  []             Other:      PLAN :  Recommendations and Planned Interventions:  [x]               Self Care Training                  [x]        Therapeutic Activities  [x]               Functional Mobility Training    []        Cognitive Retraining  []               Therapeutic Exercises           []        Endurance Activities  []               Balance Training                   []        Neuromuscular Re-Education  []               Visual/Perceptual Training     [x]   Home Safety Training  [x]               Patient Education                 [x] Family Training/Education  []               Other (comment):    Frequency/Duration: Patient will be followed by occupational therapy 1-2 times per day/4-7 days per week to address goals. Discharge Recommendations: Rehab  Further Equipment Recommendations for Discharge: N/A     Barriers to Learning/Limitations: None    PATIENT COMPLEXITY      Eval Complexity: History: LOW Complexity : Brief history review ; Examination: LOW Complexity : 1-3 performance deficits relating to physical, cognitive , or psychosocial skils that result in activity limitations and / or participation restrictions ; Decision Making:LOW Complexity : No comorbidities that affect functional and no verbal or physical assistance needed to complete eval tasks  Assessment: LOW Complexity     G-CODES:     Self Care  Current  CL= 60-79%   Goal  CJ= 20-39%. The severity rating is based on the Level of Assistance required for Functional Mobility and ADLs. SUBJECTIVE:   Patient stated MY  just  in March and I miss him very much, we were  for 71 years.     OBJECTIVE DATA SUMMARY:     Past Medical History:   Diagnosis Date    Allergic rhinitis     Anemia     Asymptomatic carotid bruit     asymptomatic    Atrial fibrillation ([facility] [address] 2017    Started on Xarelto    Atrial fibrillation ([facility] [address]     Cardiac cath 12/15/2010    pRCA 30%. LM patent. LAD 25%. CX 30%. LVEDP 10 mmHg. EF 60%. Mild AS.  Cardiac nuclear imaging test, low to mod risk 2016    Low to intermediate risk. Sm reversible inferoapical defect may be a sm area of ischemia. No prior infarction. EF >75%. Neg EKG on pharm stress test.    Carotid duplex 02/10/2014    Mild 1-49% bilateral ICA stenosis.      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 Anand Iyer Hypertension     Lung collapse Dec 2011    Collapse of left upper lobe    Mild aortic stenosis 8/22/2011    Osteoarthritis     Osteoporosis     Positive PPD      Past Surgical History:   Procedure Laterality Date    HX CATARACT REMOVAL      HX COLONOSCOPY      HX HEART CATHETERIZATION  12/15/10    negative    HX HYSTERECTOMY      HX KNEE ARTHROSCOPY      HX PTCA  3/1995    of the LAD; has had no recurrent angina since    HX TONSILLECTOMY      [place] BRONCHOSCOPY BRONCHIAL/ENDOBRNCL BX 1+ SITES  9/25/2012          Prior Level of Function/Home Situation: she lives alone and has IADL assitance for her family and her neighbors  [address]: Private residence  One/Two Story Residence: One story  Living Alone: Yes  Support Systems: Child(pauline)  Patient Expects to be Discharged to[de-identified] Unknown  Current DME Used/Available at Home: [employer] Grumbling, straight, Walker  [x]  Right hand dominant   []  Left hand dominant  Cognitive/Behavioral Status:   she is alert, oriented X 3 (not to situation)  Skin: no skin integrity issues noted during OT evaluation  Edema: no extremity edema is noted  Vision/Perceptual:      tracking is WFL    Coordination:  BUE's WFL  Balance:  Sitting: Impaired; With support (Demos trunk lean towards right)  Sitting - Static: Fair (occasional); Good (unsupported)  Sitting - Dynamic: Fair (occasional)  Standing: Impaired; With support (Posterior lean)  Standing - Static: Poor (bracing BLE's against bed/chair behind her)  Standing - Dynamic : Poor  Strength:  BUE's: 4/5  Tone & Sensation:  BUE's WFL  Range of Motion:  BUMARGARET's WFL  Functional Mobility and Transfers for ADLs:  Bed Mobility:  Rolling: Contact guard assistance  Supine to Sit: Minimum assistance; Moderate assistance  Scooting: Supervision  Transfers:  Sit to Stand: Moderate assistance  Bed to Chair: Minimum assistance   return to bed from chair was min to mod assist (she reports she was fatigued)    ADL Assessment:   self feeding: independent (simulated)  Grooming: set up at sitting level (simulated)  UB bathing/dressing: min assist to stay on task  LB bathing/dressing: max assist (she is able to reach to knees only)  Pain:  Pt reports 0/10 pain or discomfort prior to treatment.    Pt reports 0/10 pain or discomfort post treatment. Activity Tolerance:   No SOB noted; she is reporting generalized fatigue from sitting up in the chair (she was returned to bed)  Please refer to the flowsheet for vital signs taken during this treatment. After treatment:   [] Patient left in no apparent distress sitting up in chair  [x] Patient left in no apparent distress in bed  [] Call bell left within reach  [] Nursing notified  [] Caregiver present  [] Bed alarm activated    COMMUNICATION/EDUCATION:   [] Home safety education was provided and the patient/caregiver indicated understanding. [] Patient/family have participated as able in goal setting and plan of care. [] Patient/family agree to work toward stated goals and plan of care. [x] Patient understands intent and goals of therapy, but is neutral about his/her participation. [] Patient is unable to participate in goal setting and plan of care.     Thank you for this referral.  Romeo Leone OTR/L  Time Calculation: 11 mins

## 2018-08-25 NOTE — PROGRESS NOTES
Problem: Falls - Risk of  Goal: *Absence of Falls  Document Amita Fall Risk and appropriate interventions in the flowsheet.    Outcome: Progressing Towards Goal  Fall Risk Interventions:  Mobility Interventions: Bed/chair exit alarm, OT consult for ADLs, PT Consult for mobility concerns, Patient to call before getting OOB    Mentation Interventions: Adequate sleep, hydration, pain control, Bed/chair exit alarm, Door open when patient unattended, Evaluate medications/consider consulting pharmacy    Medication Interventions: Assess postural VS orthostatic hypotension, Bed/chair exit alarm, Evaluate medications/consider consulting pharmacy, Patient to call before getting OOB, Teach patient to arise slowly, Utilize gait belt for transfers/ambulation    Elimination Interventions: Bed/chair exit alarm, Call light in reach, Patient to call for help with toileting needs, Toilet paper/wipes in reach, Toileting schedule/hourly rounds    History of Falls Interventions: Bed/chair exit alarm, Consult care management for discharge planning, Door open when patient unattended, Evaluate medications/consider consulting pharmacy, Investigate reason for fall, Room close to nurse's station, Utilize gait belt for transfer/ambulation

## 2018-08-25 NOTE — PROGRESS NOTES
[facility]/IPR REFERRAL CONTACT NOTE  [id number] [facility]    Re: Petr Byrnes Consult for IP Rehab Screen received. Patient presented to ed after acute onset altered mental status and a fall - found lying on floor for unknown amount of time-neighbors forced entry and found her on the floor between her bed and dresser soiled with urine- in ED CT head showed subacute infarct in the region of the left caudate and basal ganglia with vasogenic edema. Patient admitted. Neuro consult suspicion the etiology of the stroke is cardio-embolic- echo pending. PT/OT/ST ordered, evals completed with recommendation of rehab per PT/OT. ST recommends puree/nectar-thick liquid diet meds crushed, MBSS pending. Patient home alone and used cane prior to admit. Will follow status and advise following workup.      Thank you for this consult,    Gem Jones

## 2018-08-25 NOTE — ROUTINE PROCESS
Bedside shift change report given to Pelon Bolaños (oncoming nurse) by Kimberly Howell (offgoing nurse). Report included the following information SBAR, Intake/Output, MAR, Recent Results and Cardiac Rhythm AFib.

## 2018-08-25 NOTE — PROGRESS NOTES
Problem: Dysphagia (Adult)  Goal: *Acute Goals and Plan of Care (Insert Text)  Recommendations:  Diet: puree/nectar-thick   Meds: meds crushed or IV  Aspiration Precautions  Oral Care TID  Other: MBS Monday    Goals:  Patient will:  1. Tolerate PO trials with 0 s/s overt distress in 4/5 trials  2. Utilize compensatory swallow strategies/maneuvers (decrease bite/sip, size/rate, alt. liq/sol) with min cues in 4/5 trials  3. Perform oral-motor/laryngeal exercises to increase oropharyngeal swallow function with min cues  4. Complete an objective swallow study (i.e., MBSS) to assess swallow integrity, r/o aspiration, and determine of safest LRD, min A      Speech LAnguage Pathology bedside swallow evaluation    Patient: Evelyn Ortega (43 y.o. female)  Date: 8/25/2018  Primary Diagnosis: CVA (cerebral vascular accident) [facility])  UTI (urinary tract infection)  Confusion        Precautions: aspiration       PLOF: lives with family     ASSESSMENT :  Based on the objective data described below, the patient presents with mild-moderate OP dysphagia In the setting of stroke. Pt A&Ox3. Basic cognition intact. Oral-motor exam revealed structures grossly intact for mastication and deglutition. Accepted cup sips of thin liquids with immediate coughing. Resolved with cup sips of nectar-thick. Pudding WNL. No cough with nectar-thick when used as wash for pudding. Aspiration s/s of cracker when nectar-thick used as wash. At this time, pt safest for puree/nectar-thick liquid diet. Meds should be crushed. May benefit from [facility] to assess swallow integrity and rule-out aspiration. Dtr at b/s for education. Patient will benefit from skilled intervention to address the above impairments.   Patients rehabilitation potential is considered to be Fair  Factors which may influence rehabilitation potential include:   []            None noted  []            Mental ability/status  [x]            Medical condition  []            Home/family situation and support systems  []            Safety awareness  []            Pain tolerance/management  []            Other:      PLAN :  Recommendations and Planned Interventions:  puree/nectar-thick liquid diet. Meds should be crushed  Frequency/Duration: Patient will be followed by speech-language pathology 1-2 times per day/4-7 days per week to address goals. Discharge Recommendations: Skilled Nursing Facility     SUBJECTIVE:   Patient stated Vergil Perch you. OBJECTIVE:     Past Medical History:   Diagnosis Date    Allergic rhinitis     Anemia     Asymptomatic carotid bruit     asymptomatic    Atrial fibrillation ([facility] [address] 04/2017    Started on Xarelto    Atrial fibrillation ([facility] [address]     Cardiac cath 12/15/2010    pRCA 30%. LM patent. LAD 25%. CX 30%. LVEDP 10 mmHg. EF 60%. Mild AS.  Cardiac nuclear imaging test, low to mod risk 02/22/2016    Low to intermediate risk. Sm reversible inferoapical defect may be a sm area of ischemia. No prior infarction. EF >75%. Neg EKG on pharm stress test.    Carotid duplex 02/10/2014    Mild 1-49% bilateral ICA stenosis.      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 Dawson Richey Hypertension     Lung collapse Dec 2011    Collapse of left upper lobe    Mild aortic stenosis 8/22/2011    Osteoarthritis     Osteoporosis     Positive PPD      Past Surgical History:   Procedure Laterality Date    HX CATARACT REMOVAL      HX COLONOSCOPY      HX HEART CATHETERIZATION  12/15/10    negative    HX HYSTERECTOMY      HX KNEE ARTHROSCOPY      HX PTCA  3/1995    of the LAD; has had no recurrent angina since    HX TONSILLECTOMY      ID BRONCHOSCOPY BRONCHIAL/ENDOBRNCL BX 1+ SITES  9/25/2012          Prior Level of Function/Home Situation: lives with dtr  [address]: Private residence  [address] [place] [address] St: One story  Living Alone: Yes  Support Systems: Child(pauline)  Patient Expects to be Discharged to[de-identified] Unknown  Current DME Used/Available at Home: [address], [place], [place], [place]  Diet prior to admission: regular/thin  Current Diet:  puree/nectar-thick liquid diet. Meds should be crushed   Cognitive and Communication Status:  Neurologic State: Alert  Orientation Level: Oriented to person, Oriented to place, Oriented to situation  Cognition: Appropriate for age attention/concentration  Perception: Appears intact  Perseveration: No perseveration noted  Safety/Judgement: Fall prevention  Oral Assessment:  Oral Assessment  Labial: No impairment  Dentition: Intact  Oral Hygiene: good  Lingual: Decreased rate  Velum: No impairment  Mandible: No impairment  P.O. Trials:  Patient Position: [facility] 60  Vocal quality prior to P.O.: Low volume  Consistency Presented: Thin liquid; Nectar thick liquid;Pudding; Solid  How Presented: Self-fed/presented;Cup/sip;Spoon     Bolus Acceptance: No impairment  Bolus Formation/Control: No impairment     Propulsion: No impairment  Oral Residue: Lingual;Less than 10% of bolus  Initiation of Swallow: Delayed (# of seconds)  Laryngeal Elevation: Decreased  Aspiration Signs/Symptoms: Weak cough  Pharyngeal Phase Characteristics: Poor endurance; Suspected pharyngeal residue  Effective Modifications: Small sips and bites  Cues for Modifications:  Moderate       Oral Phase Severity: Mild  Pharyngeal Phase Severity : Mild-moderate    GCODESwallowing:  Swallow Current Status CK= 40-59%   Swallow Goal Status CI= 1-19%    The severity rating is based on the following outcomes:  KAYLEE Noms Swallow Level 4    Clinical Judgement    PAIN:  Start of Eval: 0  End of Eval: 0     After treatment:   []            Patient left in no apparent distress sitting up in chair  [x]            Patient left in no apparent distress in bed  [x]            Call bell left within reach  [x]            Nursing notified  []            Family present  []            Caregiver present  []            Bed alarm activated    COMMUNICATION/EDUCATION:   [x]            Aspiration precautions; swallow safety; compensatory techniques. [x]            Patient/family have participated as able in goal setting and plan of care. [x]            Patient/family agree to work toward stated goals and plan of care. []            Patient understands intent and goals of therapy; neutral about participation. []            Patient unable to participate in goal setting/plan of care; educ ongoing with interdisciplinary staff  []         Posted safety precautions in patient's room.     Thank you for this referral.  Hudson Quintero, SLP  Time Calculation: 10 mins

## 2018-08-25 NOTE — CONSULTS
NEUROLOGY CONSULT NOTE    Patient ID:  Elliot Diane  [id number]  89 y.o.  12/29/1928    Date of Consultation:  August 25, 2018    Referring Physician: Annalisa Rosas MD    Reason for Consultation:  CVA      Subjective:       History of Present Illness:     Ms Elliot Diane is a 80 y.o.  female who has PMH of HTN, HLD and mild MCI who  presented to ER on 24 Aug after being food by her daughter in bed soiled in own urine. In ER the pt was lethargic, confused and had slurred speech an showed difficulty leaving the bed and EMS was called   As per daughter, pt is usually active, walks on own with a rosa and drives. She tries to call her mom all day today but had no response and decided to come and check on her mom to find her as stated above. IN Er, CT head showed subacute infarct in the region of the left caudate and basal ganglia with vasogenic edema. Today the patient feels ok, depressed, tearing easily. She denies any pain, states that sometimes she noticed wheezing. Has no hx of atrial fibrillation.         Patient Active Problem List    Diagnosis Date Noted    Vasogenic brain edema ([address] 08/25/2018    Confusion 08/24/2018    UTI (urinary tract infection) 08/24/2018    CVA (cerebral vascular accident) ([address] 08/24/2018    Essential hypertension 08/24/2018    Dehydration 08/24/2018    WILSON (dyspnea on exertion) 10/06/5757    Diastolic dysfunction, left ventricle 01/10/2018    History of gross hematuria 06/13/2017    Urethral caruncle 06/13/2017    Gross hematuria 05/09/2017    Atrophic vaginitis 05/09/2017    Chronic atrial fibrillation ([address] 04/18/2017    PAF (paroxysmal atrial fibrillation) ([address] 02/28/2017    Advance directive discussed with patient 04/21/2016    Hyperlipidemia LDL goal <100 04/21/2016    Essential hypertension with goal blood pressure less than 140/90 04/21/2016    Mild single current episode of major depressive disorder ([address] 04/21/2016    Abnormal nuclear stress test 02/29/2016    Palpitations, prob. secondary to PVC's  09/01/2015    Pulmonary collapse 03/24/2015    CKD (chronic kidney disease) stage 2, GFR 60-89 ml/min     Impaired mobility and ADLs     Dysphagia     Osteoarthritis     Allergic rhinitis     History of peptic ulcer     Gastroesophageal reflux disease     Dyslipidemia     Chronic anemia     Requires supplemental oxygen     History of acute respiratory failure 05/16/2014    Closed bilateral fracture of pubic rami ([facility] [address] 05/15/2014    Urinary tract infection 05/15/2014    Osteoporosis     Asymptomatic carotid bruit 08/22/2011    Chest pain 08/22/2011    Mild aortic stenosis 08/22/2011    Coronary artery disease, status post PTCA of LAD in March 1995/EF 70%. Past Medical History:   Diagnosis Date    Allergic rhinitis     Anemia     Asymptomatic carotid bruit     asymptomatic    Atrial fibrillation ([address] 04/2017    Started on Xarelto    Atrial fibrillation ([facility] [address]     Cardiac cath 12/15/2010    pRCA 30%. LM patent. LAD 25%. CX 30%. LVEDP 10 mmHg. EF 60%. Mild AS.  Cardiac nuclear imaging test, low to mod risk 02/22/2016    Low to intermediate risk. Sm reversible inferoapical defect may be a sm area of ischemia. No prior infarction. EF >75%. Neg EKG on pharm stress test.    Carotid duplex 02/10/2014    Mild 1-49% bilateral ICA stenosis.      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 Roopa Maya Hypertension     Lung collapse Dec 2011    Collapse of left upper lobe    Mild aortic stenosis 8/22/2011    Osteoarthritis     Osteoporosis     Positive PPD       Past Surgical History:   Procedure Laterality Date    HX CATARACT REMOVAL      HX COLONOSCOPY      HX HEART CATHETERIZATION  12/15/10    negative    HX HYSTERECTOMY      HX KNEE ARTHROSCOPY      HX PTCA  3/1995    of the LAD; has had no recurrent angina since    HX TONSILLECTOMY      IA BRONCHOSCOPY BRONCHIAL/ENDOBRNCL BX 1+ SITES  9/25/2012           Prior to Admission medications    Medication Sig Start Date End Date Taking? Authorizing Provider   furosemide (LASIX) 20 mg tablet 1 tablet by mouth daily 8/24/18   Sushila Kim MD   polyethylene glycol (MIRALAX) 17 gram packet Take 17 g by mouth daily. Historical Provider   promethazine (PHENERGAN) 25 mg tablet Take 1 Tab by mouth every six (6) hours as needed for Nausea. 7/17/18   Sushila Kim MD   hydroCHLOROthiazide (MICROZIDE) 12.5 mg capsule Take 1 Cap by mouth daily. 7/16/18   Sushila Kim MD   losartan (COZAAR) 50 mg tablet Take 1 Tab by mouth daily. 5/31/18   Sushila Kim MD   tolterodine (DETROL) 2 mg tablet Take 1 Tab by mouth two (2) times a day. 1/15/18   Sushila Kim MD   naproxen sodium (ALEVE) 220 mg cap Take  by mouth. Historical Provider   carvedilol (COREG) 12.5 mg tablet take 1 tablet by mouth twice a day 10/31/17   Cheryl Patel NP   lovastatin (MEVACOR) 20 mg tablet Take 1 Tab by mouth daily. 8/31/17   Sushila Kim MD   multivitamin (ONE A DAY) tablet Take 1 Tab by mouth daily. Historical Provider   Lactobacillus acidophilus (ACIDOPHILUS) cap Take 2 Caps by mouth two (2) times a day. Historical Provider   omeprazole (PRILOSEC) 20 mg capsule Take 20 mg by mouth daily as needed.     Historical Provider   ascorbic acid (VITAMIN C) 250 mg tablet Take 1 Tab by mouth two (2) times daily (with meals). [Request refills from PCP (Dr. Shannan Chapman)] 6/17/14   Jose J Osborne MD   cholecalciferol (VITAMIN D3) 1,000 unit tablet Take 2 Tabs by mouth daily. [Request refills from PCP (Dr. Shannan Chapman)] 6/6/14   Mitzie Dance, MD   aspirin 81 mg tablet Take 81 mg by mouth daily. Historical Provider   DOCOSAHEXANOIC ACID/EPA (FISH OIL PO) Take 3 Tabs by mouth daily. 2/21/11   Historical Provider     Allergies   Allergen Reactions    Bactrim [Sulfamethoprim Ds] Nausea and Vomiting    Vicodin [Hydrocodone-Acetaminophen] Nausea Only    Atenolol Other (comments)     interolance    Atenolol Other (comments)     Black out    Codeine Other (comments)     headache    Erythromycin Unknown (comments)    Erythromycin Hives and Itching    Other Medication Not Reported This Time     Refined sugar    Pcn [Penicillins] Hives    Penicillin G Hives and Itching      Social History   Substance Use Topics    Smoking status: Never Smoker    Smokeless tobacco: Never Used      Comment: extensive secondhand smoke exposure through job    Alcohol use No      Family History   Problem Relation Age of Onset    Heart Disease Mother      History of CHF    Heart Disease Father     Heart Attack Father     Heart Disease Sister     Cancer Brother      non-Hodgkin lymphoma    Cancer Brother      liver cancer    Heart Disease Brother     Stroke Brother     Parkinsonism Sister     Parkinsonism Brother         Review of Systems    Review of Systems  GENERAL: Patient alert, awake and oriented times 3, able to communicate full sentences and not in distress. She appears sad. HEENT: No change in vision, no earache, tinnitus, sore throat or sinus congestion. NECK: No pain or stiffness.    PULMONARY: No shortness of breath, occasional  wheezing  Cardiovascular: no pnd / orthopnea, no CP  GASTROINTESTINAL: No abdominal pain, nausea, vomiting or diarrhea, melena or bright red blood per rectum. GENITOURINARY: +veurinary frequency, urgency, and  dysuria. MUSCULOSKELETAL: +ve joint and muscle pain, no back pain, no recent trauma. DERMATOLOGIC: No rash, no itching, no lesions. ENDOCRINE: No polyuria, polydipsia, no heat or cold intolerance. No recent change in weight. HEMATOLOGICAL: No anemia or easy bruising or bleeding. NEUROLOGIC: No headache, seizures, numbness, tingling. Denies any focal weakness. Objective:     Patient Vitals for the past 8 hrs:   BP Temp Pulse Resp SpO2   08/25/18 1144 123/70 98.1 °F (36.7 °C) 98 18 96 %   08/25/18 0821 124/75 98 °F (36.7 °C) 97 18 96 %   08/25/18 0740 - - - - 96 %     General Exam  No acute distress, normal body habitus    HEENT: Normocephalic, atraumatic, Sclera anicteric, normal conjunctiva  Mucous membranes: normal color and hydration     CV: No carotid bruits,   Heart: irregular rate and rhythm. No murmurs     Resp: intermittently wheezing     Neurologic Exam:    Mental status:  Alert, oriented to person, place, time and circumstance  No visual spatial neglect or overt apraxia  Language: normal fluency and comprehension    Cranial nerves: PERRL, Extraocular movements intact and full, intact sensation bilat V1-V3 distribution, face is with mild asymmetry on the right,  Tongue midline with normal strength, palat symmetric    Motor: strength 4/5 throughout, there seem to be slightly more weakness on the right UE.    No abnormal movements    Coordination: Normal finger-nose-finger    DTRs (R/L)  Biceps: (2/2)  Brachorad (2/2)  Triceps: (2/2)   Patellar (1/1)  Ankles (1/1)     Sensation: Intact and symmetric to light touch, temperature     Gait: not tested      Data Review:    Recent Results (from the past 24 hour(s))   URINALYSIS W/ RFLX MICROSCOPIC    Collection Time: 08/24/18  6:55 PM   Result Value Ref Range    Color YELLOW      Appearance CLOUDY      Specific gravity 1.015 1.005 - 1.030      pH (UA) 5.5 5.0 - 8.0 Protein 30 (A) NEG mg/dL    Glucose NEGATIVE  NEG mg/dL    Ketone 40 (A) NEG mg/dL    Bilirubin NEGATIVE  NEG      Blood SMALL (A) NEG      Urobilinogen 0.2 0.2 - 1.0 EU/dL    Nitrites POSITIVE (A) NEG      Leukocyte Esterase LARGE (A) NEG     URINE MICROSCOPIC ONLY    Collection Time: 08/24/18  6:55 PM   Result Value Ref Range    WBC 40 to 45 0 - 4 /hpf    RBC 3 to 5 0 - 5 /hpf    Epithelial cells 2+ 0 - 5 /lpf    Bacteria 4+ (A) NEG /hpf   CBC WITH AUTOMATED DIFF    Collection Time: 08/24/18  7:00 PM   Result Value Ref Range    WBC 10.1 4.6 - 13.2 K/uL    RBC 4.17 (L) 4.20 - 5.30 M/uL    HGB 13.5 12.0 - 16.0 g/dL    HCT 39.0 35.0 - 45.0 %    MCV 93.5 74.0 - 97.0 FL    MCH 32.4 24.0 - 34.0 PG    MCHC 34.6 31.0 - 37.0 g/dL    RDW 12.9 11.6 - 14.5 %    PLATELET 103 569 - 594 K/uL    MPV 9.9 9.2 - 11.8 FL    NEUTROPHILS 90 (H) 40 - 73 %    LYMPHOCYTES 6 (L) 21 - 52 %    MONOCYTES 4 3 - 10 %    EOSINOPHILS 0 0 - 5 %    BASOPHILS 0 0 - 2 %    ABS. NEUTROPHILS 9.1 (H) 1.8 - 8.0 K/UL    ABS. LYMPHOCYTES 0.7 (L) 0.9 - 3.6 K/UL    ABS. MONOCYTES 0.4 0.05 - 1.2 K/UL    ABS. EOSINOPHILS 0.0 0.0 - 0.4 K/UL    ABS.  BASOPHILS 0.0 0.0 - 0.1 K/UL    DF AUTOMATED     PROTHROMBIN TIME + INR    Collection Time: 08/24/18  7:00 PM   Result Value Ref Range    Prothrombin time 14.1 11.5 - 15.2 sec    INR 1.1 0.8 - 1.2     METABOLIC PANEL, BASIC    Collection Time: 08/24/18  7:00 PM   Result Value Ref Range    Sodium 140 136 - 145 mmol/L    Potassium 4.0 3.5 - 5.5 mmol/L    Chloride 102 100 - 108 mmol/L    CO2 25 21 - 32 mmol/L    Anion gap 13 3.0 - 18 mmol/L    Glucose 138 (H) 74 - 99 mg/dL    BUN 39 (H) 7.0 - 18 MG/DL    Creatinine 1.07 0.6 - 1.3 MG/DL    BUN/Creatinine ratio 36 (H) 12 - 20      GFR est AA 59 (L) >60 ml/min/1.73m2    GFR est non-AA 48 (L) >60 ml/min/1.73m2    Calcium 9.2 8.5 - 10.1 MG/DL   NT-PRO BNP    Collection Time: 08/24/18  7:00 PM   Result Value Ref Range    NT pro-BNP 3869 (H) 0 - 1800 PG/ML   CARDIAC PANEL,(CK, CKMB & TROPONIN)    Collection Time: 08/24/18  7:00 PM   Result Value Ref Range     (H) 26 - 192 U/L    CK - MB 4.7 (H) <3.6 ng/ml    CK-MB Index 2.4 0.0 - 4.0 %    Troponin-I, Qt. 0.03 0.0 - 0.045 NG/ML   HEPATIC FUNCTION PANEL    Collection Time: 08/24/18  7:00 PM   Result Value Ref Range    Protein, total 7.6 6.4 - 8.2 g/dL    Albumin 3.6 3.4 - 5.0 g/dL    Globulin 4.0 2.0 - 4.0 g/dL    A-G Ratio 0.9 0.8 - 1.7      Bilirubin, total 1.4 (H) 0.2 - 1.0 MG/DL    Bilirubin, direct 0.3 (H) 0.0 - 0.2 MG/DL    Alk.  phosphatase 67 45 - 117 U/L    AST (SGOT) 42 (H) 15 - 37 U/L    ALT (SGPT) 41 13 - 56 U/L   EKG, 12 LEAD, INITIAL    Collection Time: 08/24/18  7:41 PM   Result Value Ref Range    Ventricular Rate 109 BPM    Atrial Rate 100 BPM    QRS Duration 68 ms    Q-T Interval 334 ms    QTC Calculation (Bezet) 449 ms    Calculated R Axis -15 degrees    Calculated T Axis -67 degrees    Diagnosis       Atrial fibrillation with rapid ventricular response with premature   ventricular or aberrantly conducted complexes  Nonspecific ST and T wave abnormality  Abnormal ECG  When compared with ECG of 02-MAR-2017 10:39,  Questionable change in QRS axis  Nonspecific T wave abnormality, worse in Inferior leads     GLUCOSE, POC    Collection Time: 08/24/18 10:46 PM   Result Value Ref Range    Glucose (POC) 139 (H) 70 - 110 mg/dL   LIPID PANEL    Collection Time: 08/25/18  3:38 AM   Result Value Ref Range    LIPID PROFILE          Cholesterol, total 119 <200 MG/DL    Triglyceride 50 <150 MG/DL    HDL Cholesterol 73 (H) 40 - 60 MG/DL    LDL, calculated 36 0 - 100 MG/DL    VLDL, calculated 10 MG/DL    CHOL/HDL Ratio 1.6 0 - 5.0     HEMOGLOBIN A1C WITH EAG    Collection Time: 08/25/18  3:38 AM   Result Value Ref Range    Hemoglobin A1c 5.7 (H) 4.2 - 5.6 %    Est. average glucose 117 mg/dL   CBC WITH AUTOMATED DIFF    Collection Time: 08/25/18  3:38 AM   Result Value Ref Range    WBC 6.4 4.6 - 13.2 K/uL    RBC 3.72 (L) 4.20 - 5.30 M/uL    HGB 11.8 (L) 12.0 - 16.0 g/dL    HCT 34.2 (L) 35.0 - 45.0 %    MCV 91.9 74.0 - 97.0 FL    MCH 31.7 24.0 - 34.0 PG    MCHC 34.5 31.0 - 37.0 g/dL    RDW 13.0 11.6 - 14.5 %    PLATELET 159 991 - 422 K/uL    MPV 9.4 9.2 - 11.8 FL    NEUTROPHILS 78 (H) 40 - 73 %    LYMPHOCYTES 12 (L) 21 - 52 %    MONOCYTES 10 3 - 10 %    EOSINOPHILS 0 0 - 5 %    BASOPHILS 0 0 - 2 %    ABS. NEUTROPHILS 5.0 1.8 - 8.0 K/UL    ABS. LYMPHOCYTES 0.8 (L) 0.9 - 3.6 K/UL    ABS. MONOCYTES 0.7 0.05 - 1.2 K/UL    ABS. EOSINOPHILS 0.0 0.0 - 0.4 K/UL    ABS. BASOPHILS 0.0 0.0 - 0.1 K/UL    DF AUTOMATED     METABOLIC PANEL, COMPREHENSIVE    Collection Time: 08/25/18  3:38 AM   Result Value Ref Range    Sodium 142 136 - 145 mmol/L    Potassium 3.3 (L) 3.5 - 5.5 mmol/L    Chloride 107 100 - 108 mmol/L    CO2 24 21 - 32 mmol/L    Anion gap 11 3.0 - 18 mmol/L    Glucose 126 (H) 74 - 99 mg/dL    BUN 37 (H) 7.0 - 18 MG/DL    Creatinine 1.07 0.6 - 1.3 MG/DL    BUN/Creatinine ratio 35 (H) 12 - 20      GFR est AA 59 (L) >60 ml/min/1.73m2    GFR est non-AA 48 (L) >60 ml/min/1.73m2    Calcium 8.0 (L) 8.5 - 10.1 MG/DL    Bilirubin, total 0.7 0.2 - 1.0 MG/DL    ALT (SGPT) 33 13 - 56 U/L    AST (SGOT) 30 15 - 37 U/L    Alk.  phosphatase 51 45 - 117 U/L    Protein, total 6.2 (L) 6.4 - 8.2 g/dL    Albumin 2.9 (L) 3.4 - 5.0 g/dL    Globulin 3.3 2.0 - 4.0 g/dL    A-G Ratio 0.9 0.8 - 1.7     TSH 3RD GENERATION    Collection Time: 08/25/18  3:38 AM   Result Value Ref Range    TSH 0.61 0.36 - 3.74 uIU/mL   GLUCOSE, POC    Collection Time: 08/25/18  6:52 AM   Result Value Ref Range    Glucose (POC) 140 (H) 70 - 110 mg/dL   GLUCOSE, POC    Collection Time: 08/25/18 11:44 AM   Result Value Ref Range    Glucose (POC) 250 (H) 70 - 110 mg/dL       Radiology studies:   Brain MRI 25 Aug 2018  IMPRESSION:     Findings consistent with acute left MCA territory ischemic change involving  basal ganglia/caudate, and portions of the periinsular and opercular cortices.     No hemorrhage is identified.     Incidental note is made of contralateral basal ganglia mineralization.         Brain MRA:   IMPRESSION:  Mild distal left M1 luminal stenosis, likely atheromatous.     Otherwise unremarkable MRA brain         Assessment: This is a 79 yo woman with hx of HTN, Atrial fibrillation not on anticoagulation with acute left basal ganglia stroke  and portions of the periinsular and opercular cortices. I have high suspicion the etiology of the stroke is cardio-embolic. I recommend starting anticoagulation in 5-7 days.      Principal Problem:    CVA (cerebral vascular accident) ([address] (8/24/2018)    Active Problems:    Confusion (8/24/2018)      UTI (urinary tract infection) (8/24/2018)      Essential hypertension (8/24/2018)      Dehydration (8/24/2018)      Vasogenic brain edema (HCC) (8/25/2018)        Plan:   - Continue  mg for now and in few days switch to  anticoagulation    - PT/OT/Speach   - Consider pastoral care or start an an antidepressive treatment for depressions  - Avoid SBP <120-130's by administering fluids   - Rate control for afib  - ECHO pending   - will continue to follow      Luis Ramos MD  Adult Neurologist  8/25/2018

## 2018-08-25 NOTE — PROGRESS NOTES
Problem: Mobility Impaired (Adult and Pediatric)  Goal: *Acute Goals and Plan of Care (Insert Text)  Physical Therapy Goals  Initiated 8/25/2018 and to be accomplished within 7 day(s)  1. Patient will move from supine to sit and sit to supine, scoot up and down and roll side to side in bed with contact guard. 2.  Patient will transfer from bed to chair and chair to bed with contact guard using the least restrictive device. 3.  Patient will perform sit to stand with contact guard. 4.  Patient will ambulate with minimum assist for >25 feet with the least restrictive device. physical Therapy EVALUATION    Patient: Ana Hartley (31 y.o. female)  Date: 8/25/2018  Primary Diagnosis: CVA (cerebral vascular accident) [facility])  UTI (urinary tract infection)  Confusion        Precautions: Fall        ASSESSMENT :  Patient is 81yo F admitted to hospital for CVA and presents today alert and agreeable to therpay and was supine in bed upon arrival. Patient transferred to sitting EOB with additional time and Min/ModA. Once sitting EOB patient demos trunk lean towards right and is able to correct with therapist's verbal cues. Patient then then performed objective assessment and given demo with instructions on sit <> stand tranfers. Patient stood at [facility] with poor balance, bracing BLE's against bed and pulling on RW. Patient required assist with hand plcament on RW and to arm rests of reclnier during transfers. Patient transferred to sitting after apprx 30sec. Demos fair to poor carryover of therapist cues to tuck pelvis and bring trunk forward to midline. Patient then instructed on bed to chair stand step transfer with therapist in front of patient and performed with Lynette. Once sitting in recliner therapist noticed incontinence of bowel and patient stood at [facility] additional 1-2 mins with cues for upright posture during malaika-care. Patient at times required Min/ModA to correct balance and endorsed fatigue in standing. Once cleaned. Patient transferred to sitting and was left resting [facility] call bell by her side and chair alarm activated. Educated patient to try to sit up in recliner several hours and call for assist via call bell if she needed to get up for any reason. Patient will benefit from skilled intervention to address the above impairments. Patients rehabilitation potential is considered to be Good  Factors which may influence rehabilitation potential include:   []         None noted  []         Mental ability/status  [x]         Medical condition  [x]         Home/family situation and support systems  [x]         Safety awareness  [x]         Pain tolerance/management  []         Other:      PLAN :  Recommendations and Planned Interventions:  [x]           Bed Mobility Training             [x]    Neuromuscular Re-Education  [x]           Transfer Training                   []    Orthotic/Prosthetic Training  [x]           Gait Training                          []    Modalities  [x]           Therapeutic Exercises          []    Edema Management/Control  [x]           Therapeutic Activities            [x]    Patient and Family Training/Education  []           Other (comment):    Frequency/Duration: Patient will be followed by physical therapy 1-2 times per day/4-7 days per week to address goals. Discharge Recommendations: Rehab  Further Equipment Recommendations for Discharge: N/A     G-CODES     Mobility  Current  CK= 40-59%   Goal  CI= 1-19%.   The severity rating is based on the Level of Assistance required for Functional Mobility and ADLs.        G-CODES     Eval Complexity: History: MEDIUM  Complexity : 1-2 comorbidities / personal factors will impact the outcome/ POC Exam:MEDIUM Complexity : 3 Standardized tests and measures addressing body structure, function, activity limitation and / or participation in recreation  Presentation: LOW Complexity : Stable, uncomplicated  Clinical Decision Making:Low Complexity Overall Complexity:LOW     SUBJECTIVE:   Patient stated Stand up. Stand up.  as she cues her self to stand upright when therapist cues her not to brace LE's on bed. OBJECTIVE DATA SUMMARY:     Past Medical History:   Diagnosis Date    Allergic rhinitis     Anemia     Asymptomatic carotid bruit     asymptomatic    Atrial fibrillation ([address] 04/2017    Started on Xarelto    Atrial fibrillation ([facility] [address]     Cardiac cath 12/15/2010    pRCA 30%. LM patent. LAD 25%. CX 30%. LVEDP 10 mmHg. EF 60%. Mild AS.  Cardiac nuclear imaging test, low to mod risk 02/22/2016    Low to intermediate risk. Sm reversible inferoapical defect may be a sm area of ischemia. No prior infarction. EF >75%. Neg EKG on pharm stress test.    Carotid duplex 02/10/2014    Mild 1-49% bilateral ICA stenosis.      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 Janice Jones Hypertension     Lung collapse Dec 2011    Collapse of left upper lobe    Mild aortic stenosis 8/22/2011    Osteoarthritis     Osteoporosis     Positive PPD      Past Surgical History:   Procedure Laterality Date    HX CATARACT REMOVAL      HX COLONOSCOPY      HX HEART CATHETERIZATION  12/15/10    negative    HX HYSTERECTOMY      HX KNEE ARTHROSCOPY      HX PTCA  3/1995    of the LAD; has had no recurrent angina since    HX TONSILLECTOMY      [place] BRONCHOSCOPY BRONCHIAL/ENDOBRNCL BX 1+ SITES  9/25/2012          Barriers to Learning/Limitations: None  Compensate with: N/A  Prior Level of Function/Home Situation: Patient   Home Situation  Home Environment: Private residence  One/Two Story Residence: One story  Living Alone: Yes  Support Systems: Child(pauline)  Patient Expects to be Discharged to[de-identified] Unknown  Current DME Used/Available at Home: Cane, straight, Walker  Critical Behavior:   Orientation Level: Oriented X4   Strength:    Strength: Generally decreased, functional (L knee flex/ext 3/5, DF 5/5; RLE 5/5)   Tone & Sensation:   Tone: Normal (BLE)   Sensation: Intact (BLE)   Range Of Motion:  AROM: Within functional limits (BLE)   Functional Mobility:  Bed Mobility:  Rolling: Contact guard assistance  Supine to Sit: Minimum assistance; Moderate assistance   Scooting: Supervision  Transfers:  Sit to Stand: Moderate assistance  Stand to Sit: Moderate assistance     Bed to Chair: Minimum assistance    Balance:   Sitting: Impaired; With support (Demos trunk lean towards right)  Sitting - Static: Fair (occasional); Good (unsupported)  Sitting - Dynamic: Fair (occasional)  Standing: Impaired; With support (Posterior lean)  Standing - Static: Poor (bracing BLE's against bed/chair behind her)  Standing - Dynamic : Poor  Pain:  Pt reports 0/10 pain or discomfort prior to treatment.    Pt reports 0/10 pain or discomfort post treatment. Activity Tolerance:   Patient   Please refer to the flowsheet for vital signs taken during this treatment. After treatment:   [x]         Patient left in no apparent distress sitting up in chair  []         Patient left in no apparent distress in bed  [x]         Call bell left within reach  []         Nursing notified   []         Caregiver present  [x]         Chair alarm activated  []         SCDs in place to B LE     COMMUNICATION/EDUCATION:   [x]         Fall prevention education was provided and the patient/caregiver indicated understanding.   [x] Patient/family have participated as able in goal setting and plan of care. [x]         Patient/family agree to work toward stated goals and plan of care. []         Patient understands intent and goals of therapy, but is neutral about his/her participation. []         Patient is unable to participate in goal setting and plan of care.     Thank you for this referral.  Sammi Greco, PT   Time Calculation: 26 mins

## 2018-08-25 NOTE — PROGRESS NOTES
Problem: Falls - Risk of  Goal: *Absence of Falls  Document Amita Fall Risk and appropriate interventions in the flowsheet.    Outcome: Progressing Towards Goal  Fall Risk Interventions:

## 2018-08-25 NOTE — ROUTINE PROCESS
.Primary Nurse Viji Burch [facility], [address], RN performed a dual skin assessment on this patient No impairment noted  Noah score is 15

## 2018-08-25 NOTE — ROUTINE PROCESS
TRANSFER - IN REPORT:    Verbal report received from Gabe(name) on Dane Roth  being received from ED(unit) for routine progression of care      Report consisted of patients Situation, Background, Assessment and   Recommendations(SBAR). Information from the following report(s) SBAR, Intake/Output, MAR, Recent Results and Cardiac Rhythm Afib was reviewed with the receiving nurse. Opportunity for questions and clarification was provided. Assessment completed upon patients arrival to unit and care assumed.

## 2018-08-26 ENCOUNTER — APPOINTMENT (OUTPATIENT)
Dept: NON INVASIVE DIAGNOSTICS | Age: 83
DRG: 064 | End: 2018-08-26
Attending: INTERNAL MEDICINE
Payer: MEDICARE

## 2018-08-26 LAB
ALBUMIN SERPL-MCNC: 3.1 G/DL (ref 3.4–5)
ALBUMIN/GLOB SERPL: 0.9 {RATIO} (ref 0.8–1.7)
ALP SERPL-CCNC: 54 U/L (ref 45–117)
ALT SERPL-CCNC: 32 U/L (ref 13–56)
ANION GAP SERPL CALC-SCNC: 7 MMOL/L (ref 3–18)
AST SERPL-CCNC: 32 U/L (ref 15–37)
BASOPHILS # BLD: 0 K/UL (ref 0–0.1)
BASOPHILS NFR BLD: 0 % (ref 0–2)
BILIRUB SERPL-MCNC: 0.4 MG/DL (ref 0.2–1)
BUN SERPL-MCNC: 36 MG/DL (ref 7–18)
BUN/CREAT SERPL: 30 (ref 12–20)
CALCIUM SERPL-MCNC: 7.9 MG/DL (ref 8.5–10.1)
CHLORIDE SERPL-SCNC: 108 MMOL/L (ref 100–108)
CO2 SERPL-SCNC: 26 MMOL/L (ref 21–32)
CREAT SERPL-MCNC: 1.21 MG/DL (ref 0.6–1.3)
DIFFERENTIAL METHOD BLD: ABNORMAL
ECHO AO ASC DIAM: 2.97 CM
ECHO AO ROOT DIAM: 2.81 CM
ECHO LA AREA 4C: 23.2 CM2
ECHO LA VOL 2C: 60.24 ML (ref 22–52)
ECHO LA VOL 4C: 70.57 ML (ref 22–52)
ECHO LA VOL BP: 70.94 ML (ref 22–52)
ECHO LA VOL/BSA BIPLANE: 46.1 ML/M2
ECHO LA VOLUME INDEX A2C: 39.14 ML/M2
ECHO LA VOLUME INDEX A4C: 45.86 ML/M2
ECHO LV INTERNAL DIMENSION DIASTOLIC: 3.93 CM (ref 3.9–5.3)
ECHO LV INTERNAL DIMENSION SYSTOLIC: 2.44 CM
ECHO LV ISOVOLUMETRIC RELAXATION TIME (IVRT): 49.2 MS
ECHO LV IVSD: 0.78 CM (ref 0.6–0.9)
ECHO LV POSTERIOR WALL DIASTOLIC: 0.82 CM (ref 0.6–0.9)
ECHO LVOT DIAM: 1.76 CM
ECHO LVOT PEAK GRADIENT: 2.2 MMHG
ECHO LVOT PEAK VELOCITY: 73.95 CM/S
ECHO LVOT VTI: 17.09 CM
ECHO MV A VELOCITY: 38.38 CM/S
ECHO MV AREA PISA: 0.1 CM2
ECHO MV E DECELERATION TIME (DT): 125 MS
ECHO MV E VELOCITY: 1.13 CM/S
ECHO MV E/A RATIO: 0.03
ECHO MV EROA PISA: 0.1 CM2
ECHO MV REGURGITANT PEAK GRADIENT: 94.8 MMHG
ECHO MV REGURGITANT PEAK VELOCITY: 486.72 CM/S
ECHO MV REGURGITANT RADIUS PISA: 0.38 CM
ECHO MV REGURGITANT VOLUME: 11.62 CC
ECHO MV REGURGITANT VTIA: 163.79 CM
ECHO RV TAPSE: 1.19 CM (ref 1.5–2)
ECHO TV REGURGITANT MAX VELOCITY: 309.57 CM/S
ECHO TV REGURGITANT PEAK GRADIENT: 38.3 MMHG
EOSINOPHIL # BLD: 0 K/UL (ref 0–0.4)
EOSINOPHIL NFR BLD: 0 % (ref 0–5)
ERYTHROCYTE [DISTWIDTH] IN BLOOD BY AUTOMATED COUNT: 13.5 % (ref 11.6–14.5)
GLOBULIN SER CALC-MCNC: 3.3 G/DL (ref 2–4)
GLUCOSE BLD STRIP.AUTO-MCNC: 114 MG/DL (ref 70–110)
GLUCOSE BLD STRIP.AUTO-MCNC: 143 MG/DL (ref 70–110)
GLUCOSE BLD STRIP.AUTO-MCNC: 155 MG/DL (ref 70–110)
GLUCOSE BLD STRIP.AUTO-MCNC: 198 MG/DL (ref 70–110)
GLUCOSE SERPL-MCNC: 163 MG/DL (ref 74–99)
HCT VFR BLD AUTO: 33.8 % (ref 35–45)
HGB BLD-MCNC: 11.8 G/DL (ref 12–16)
LYMPHOCYTES # BLD: 0.7 K/UL (ref 0.9–3.6)
LYMPHOCYTES NFR BLD: 8 % (ref 21–52)
MCH RBC QN AUTO: 32.3 PG (ref 24–34)
MCHC RBC AUTO-ENTMCNC: 34.9 G/DL (ref 31–37)
MCV RBC AUTO: 92.6 FL (ref 74–97)
MONOCYTES # BLD: 0.6 K/UL (ref 0.05–1.2)
MONOCYTES NFR BLD: 7 % (ref 3–10)
NEUTS SEG # BLD: 7.6 K/UL (ref 1.8–8)
NEUTS SEG NFR BLD: 85 % (ref 40–73)
PLATELET # BLD AUTO: 148 K/UL (ref 135–420)
PMV BLD AUTO: 9.8 FL (ref 9.2–11.8)
POTASSIUM SERPL-SCNC: 5 MMOL/L (ref 3.5–5.5)
PROT SERPL-MCNC: 6.4 G/DL (ref 6.4–8.2)
RBC # BLD AUTO: 3.65 M/UL (ref 4.2–5.3)
SODIUM SERPL-SCNC: 141 MMOL/L (ref 136–145)
WBC # BLD AUTO: 8.8 K/UL (ref 4.6–13.2)

## 2018-08-26 PROCEDURE — 74011000250 HC RX REV CODE- 250: Performed by: INTERNAL MEDICINE

## 2018-08-26 PROCEDURE — 97530 THERAPEUTIC ACTIVITIES: CPT

## 2018-08-26 PROCEDURE — 74011250636 HC RX REV CODE- 250/636: Performed by: INTERNAL MEDICINE

## 2018-08-26 PROCEDURE — 93306 TTE W/DOPPLER COMPLETE: CPT

## 2018-08-26 PROCEDURE — 77030038269 HC DRN EXT URIN PURWCK BARD -A

## 2018-08-26 PROCEDURE — 65660000000 HC RM CCU STEPDOWN

## 2018-08-26 PROCEDURE — 97116 GAIT TRAINING THERAPY: CPT

## 2018-08-26 PROCEDURE — 80053 COMPREHEN METABOLIC PANEL: CPT | Performed by: INTERNAL MEDICINE

## 2018-08-26 PROCEDURE — 97112 NEUROMUSCULAR REEDUCATION: CPT

## 2018-08-26 PROCEDURE — 74011250637 HC RX REV CODE- 250/637: Performed by: INTERNAL MEDICINE

## 2018-08-26 PROCEDURE — 74011000258 HC RX REV CODE- 258: Performed by: INTERNAL MEDICINE

## 2018-08-26 PROCEDURE — 82962 GLUCOSE BLOOD TEST: CPT

## 2018-08-26 PROCEDURE — 36415 COLL VENOUS BLD VENIPUNCTURE: CPT | Performed by: INTERNAL MEDICINE

## 2018-08-26 PROCEDURE — 85025 COMPLETE CBC W/AUTO DIFF WBC: CPT | Performed by: INTERNAL MEDICINE

## 2018-08-26 RX ORDER — HEPARIN SODIUM 5000 [USP'U]/ML
5000 INJECTION, SOLUTION INTRAVENOUS; SUBCUTANEOUS 3 TIMES DAILY
Status: DISCONTINUED | OUTPATIENT
Start: 2018-08-26 | End: 2018-08-30

## 2018-08-26 RX ORDER — SODIUM CHLORIDE 9 MG/ML
10 INJECTION INTRAMUSCULAR; INTRAVENOUS; SUBCUTANEOUS
Status: COMPLETED | OUTPATIENT
Start: 2018-08-26 | End: 2018-08-26

## 2018-08-26 RX ORDER — SODIUM CHLORIDE 0.9 % (FLUSH) 0.9 %
5-10 SYRINGE (ML) INJECTION EVERY 8 HOURS
Status: DISCONTINUED | OUTPATIENT
Start: 2018-08-26 | End: 2018-08-31 | Stop reason: HOSPADM

## 2018-08-26 RX ORDER — ACETAMINOPHEN 325 MG/1
650 TABLET ORAL
Status: DISCONTINUED | OUTPATIENT
Start: 2018-08-26 | End: 2018-08-31 | Stop reason: HOSPADM

## 2018-08-26 RX ORDER — IPRATROPIUM BROMIDE AND ALBUTEROL SULFATE 2.5; .5 MG/3ML; MG/3ML
3 SOLUTION RESPIRATORY (INHALATION)
Status: DISCONTINUED | OUTPATIENT
Start: 2018-08-26 | End: 2018-08-31 | Stop reason: HOSPADM

## 2018-08-26 RX ADMIN — SODIUM CHLORIDE 100 ML/HR: 450 INJECTION, SOLUTION INTRAVENOUS at 11:30

## 2018-08-26 RX ADMIN — ACETAMINOPHEN 650 MG: 325 TABLET, FILM COATED ORAL at 11:21

## 2018-08-26 RX ADMIN — DILTIAZEM HYDROCHLORIDE 30 MG: 30 TABLET, FILM COATED ORAL at 11:21

## 2018-08-26 RX ADMIN — ASPIRIN 325 MG ORAL TABLET 325 MG: 325 PILL ORAL at 08:24

## 2018-08-26 RX ADMIN — LOVASTATIN 40 MG: 20 TABLET ORAL at 08:24

## 2018-08-26 RX ADMIN — ACETAMINOPHEN 650 MG: 325 TABLET, FILM COATED ORAL at 22:36

## 2018-08-26 RX ADMIN — HEPARIN SODIUM 5000 UNITS: 5000 INJECTION, SOLUTION INTRAVENOUS; SUBCUTANEOUS at 19:08

## 2018-08-26 RX ADMIN — SODIUM CHLORIDE 100 ML/HR: 450 INJECTION, SOLUTION INTRAVENOUS at 22:28

## 2018-08-26 RX ADMIN — IPRATROPIUM BROMIDE AND ALBUTEROL SULFATE 3 ML: .5; 3 SOLUTION RESPIRATORY (INHALATION) at 19:10

## 2018-08-26 RX ADMIN — SODIUM CHLORIDE 10 ML: 9 INJECTION, SOLUTION INTRAMUSCULAR; INTRAVENOUS; SUBCUTANEOUS at 12:24

## 2018-08-26 RX ADMIN — DILTIAZEM HYDROCHLORIDE 30 MG: 30 TABLET, FILM COATED ORAL at 08:24

## 2018-08-26 RX ADMIN — CEFTRIAXONE SODIUM 1 G: 1 INJECTION, POWDER, FOR SOLUTION INTRAMUSCULAR; INTRAVENOUS at 22:28

## 2018-08-26 RX ADMIN — DILTIAZEM HYDROCHLORIDE 30 MG: 30 TABLET, FILM COATED ORAL at 19:08

## 2018-08-26 RX ADMIN — Medication 10 ML: at 22:36

## 2018-08-26 RX ADMIN — SODIUM CHLORIDE 100 ML/HR: 450 INJECTION, SOLUTION INTRAVENOUS at 03:17

## 2018-08-26 RX ADMIN — FAMOTIDINE 20 MG: 20 TABLET ORAL at 19:08

## 2018-08-26 NOTE — PROGRESS NOTES
Problem: Mobility Impaired (Adult and Pediatric)  Goal: *Acute Goals and Plan of Care (Insert Text)  Physical Therapy Goals  Initiated 8/25/2018 and to be accomplished within 7 day(s)  1. Patient will move from supine to sit and sit to supine, scoot up and down and roll side to side in bed with contact guard. 2.  Patient will transfer from bed to chair and chair to bed with contact guard using the least restrictive device. 3.  Patient will perform sit to stand with contact guard. 4.  Patient will ambulate with minimum assist for >25 feet with the least restrictive device. Outcome: Progressing Towards Goal  physical Therapy TREATMENT    Patient: Misael Lewis (61 y.o. female)  Date: 8/26/2018  Diagnosis: CVA (cerebral vascular accident) [facility])  UTI (urinary tract infection)  Confusion CVA (cerebral vascular accident) ([facility] [address]       Precautions:     Chart, physical therapy assessment, plan of care and goals were reviewed. OBJECTIVE/ ASSESSMENT:  Patient found supine HOB elevated willing to work with PT. Pt performed rolling to right side CGA and cueing for sequencing and sup to sit min A again req cueing for sequencing. Pt sat at EOB w/ good static and fair dynamic sitting balance w/ min A for scooting to EOB for proper alignment for standing. PT performed sit <> stand from mult surfaces (bed and chair) X 4 mod A initially progressing to min A w/ cueing and reps, however digressed to mod A w/ fatigue. Prior to gait training and amb to chair, pt performed lateral WSing progressing to SLS w/ good stabilization (B) LE. Pt amb to chair 3' mod A w/ RW req cueing for sequencing and alignment. Post seated rest break, pt cont gait training at chair performing (B) tap ups to toilet paper roll (too challenging) digressing to wipe package 10X ea demonstrating control LLE > right LE. During tap ups, pt req neuro mm facitliation of hip/knee flexion of right LE > LLE to improve neuro mm control.  Pt returned to chair w/ all needs in reach, provided there-ex and education, and notified nurse of position/status. Pt would be a great candidate for further rehab services to maximize safety and recovery outcomes. Education:safety, importance of cont functional mobility in sitting and standing to maximize recovery  Progression toward goals:  []      Improving appropriately and progressing toward goals  [x]      Improving slowly and progressing toward goals  []      Not making progress toward goals and plan of care will be adjusted     PLAN:  Patient continues to benefit from skilled intervention to address the above impairments. Continue treatment per established plan of care. Discharge Recommendations:  Inpatient Rehab  Further Equipment Recommendations for Discharge:  rolling walker     SUBJECTIVE:   Patient stated I feel a little weaker today.     OBJECTIVE DATA SUMMARY:   Critical Behavior:  Neurologic State: Alert  Orientation Level: Oriented to person, Oriented to place, Oriented to situation, Disoriented to time  Cognition: Follows commands  Safety/Judgement: Fall prevention  Functional Mobility Training:  Bed Mobility:  Rolling: Contact guard assistance  Supine to Sit: Minimum assistance  Scooting: Minimum assistance  Transfers:  Sit to Stand: Moderate assistance;Minimum assistance; Additional time  Stand to Sit: Moderate assistance;Minimum assistance  Bed to Chair: Moderate assistance  Balance:  Sitting: Intact; With support  Sitting - Static: Good (unsupported)  Sitting - Dynamic: Fair (occasional)  Standing: Impaired; With support  Standing - Static: Fair  Standing - Dynamic : Fair  Ambulation/Gait Training:  Distance (ft): 3 Feet (ft) (to chair )  Assistive Device: Walker, rolling  Ambulation - Level of Assistance: Moderate assistance; Additional time  Gait Abnormalities: Decreased step clearance;Shuffling gait  Base of Support: Center of gravity altered;Narrowed  Speed/Linnea: Slow;Shuffled  Step Length: Left shortened;Right shortened    Therapeutic Exercises:       EXERCISE   Sets   Reps   Active Active Assist   Passive Self- assited ROM   Comments   Ankle Pumps 1 10  [x] [] [] []    Standing tap-ups 1 10 [x] [] [] []    Glut Sets   [] [] [] []    Short Arc Quads   [] [] [] []    Heel Slides   [] [] [] []    Straight Leg Raises   [] [] [] []    Hip Abd   [] [] [] []    Long Arc Quads   [] [] [] []    Seated Marching   [] [] [] []    Seated Knee Flexion   [] [] [] []    Standing Marching   [] [] [] []      Pain: 0/10   Activity Tolerance:   Fatigues w/ activity, however recovers well w/ great motivation   Please refer to the flowsheet for vital signs taken during this treatment. After treatment:   [x] Patient left in no apparent distress sitting up in chair  [] Patient left in no apparent distress in bed  [x] Call bell left within reach  [x] Nursing notified  [x] MD present  [] Bed alarm activated  [] SCDs applied  [] Ice applied      Lisa Isbell PTA   Time Calculation: 38 mins    Mobility  Current  CK= 40-59%. The severity rating is based on the Level of Assistance required for Functional Mobility and ADLs. Mobility   Goal  CI= 1-19%. The severity rating is based on the Level of Assistance required for Functional Mobility and ADLs.

## 2018-08-26 NOTE — PROGRESS NOTES
conducted an initial consultation and Spiritual Assessment for Smith Peck, who is a 80 y.o.,female. Patients Primary Language is: [place]. According to the patients EMR Amish Affiliation is: Confucianist. The reason the Patient came to the hospital is:   Patient Active Problem List    Diagnosis Date Noted    Vasogenic brain edema ([facility] [address] 08/25/2018    Confusion 08/24/2018    UTI (urinary tract infection) 08/24/2018    CVA (cerebral vascular accident) ([facility] [address] 08/24/2018    Essential hypertension 08/24/2018    Dehydration 08/24/2018    WILSON (dyspnea on exertion) 70/63/9991    Diastolic dysfunction, left ventricle 01/10/2018    History of gross hematuria 06/13/2017    Urethral caruncle 06/13/2017    Gross hematuria 05/09/2017    Atrophic vaginitis 05/09/2017    Chronic atrial fibrillation ([address] 04/18/2017    [facility]parox[facility] 02/28/2017    Advance directive discussed with patient 04/21/2016    Hyperlipidemia LDL goal <100 04/21/2016    Essential hypertension with goal blood pressure less than 140/90 04/21/2016    Mild single current episode of major depressive disorder ([facility] [address] 04/21/2016    Abnormal nuclear stress test 02/29/2016    Palpitations, prob. secondary to PVC's  09/01/2015    Pulmonary collapse 03/24/2015    CKD (chronic kidney disease) stage 2, GFR 60-89 ml/min     Impaired mobility and ADLs     Dysphagia     Osteoarthritis     Allergic rhinitis     History of peptic ulcer     Gastroesophageal reflux disease     Dyslipidemia     Chronic anemia     Requires supplemental oxygen     History of acute respiratory failure 05/16/2014    Closed bilateral fracture of pubic rami ([facility] [address] 05/15/2014    Urinary tract infection 05/15/2014    Osteoporosis     Asymptomatic carotid bruit 08/22/2011    Chest pain 08/22/2011    Mild aortic stenosis 08/22/2011    Coronary artery disease, status post PTCA of LAD in March 1995/EF 70%.          The  provided the following Interventions:  Initiated a relationship of care and support. Explored issues of cecil, belief, spirituality and Muslim/ritual needs while hospitalized. Listened empathically. Provided chaplaincy education. Provided information about Spiritual Care Services. Offered prayer and assurance of continued prayers on patient's behalf. Chart reviewed. The following outcomes where achieved:  Patient shared limited information about both their medical narrative and spiritual journey/beliefs.  confirmed Patient's Alevism Affiliation. Patient processed feeling about current hospitalization. Patient expressed gratitude for 's visit. Assessment:  Patient does not have any Muslim/cultural needs that will affect patients preferences in health care. There are no spiritual or Muslim issues which require intervention at this time. Plan:  Chaplains will continue to follow and will provide pastoral care on an as needed/requested basis.  recommends bedside caregivers page  on duty if patient shows signs of acute spiritual or emotional distress.         [id number] [facility].   [phone]

## 2018-08-26 NOTE — PROGRESS NOTES
Problem: Falls - Risk of  Goal: *Absence of Falls  Document Amita Fall Risk and appropriate interventions in the flowsheet.    Outcome: Progressing Towards Goal  Fall Risk Interventions:  Mobility Interventions: Bed/chair exit alarm, Patient to call before getting OOB, PT Consult for mobility concerns, OT consult for ADLs    Mentation Interventions: Adequate sleep, hydration, pain control, Bed/chair exit alarm, Door open when patient unattended, Evaluate medications/consider consulting pharmacy    Medication Interventions: Bed/chair exit alarm, Evaluate medications/consider consulting pharmacy, Patient to call before getting OOB, Teach patient to arise slowly, Utilize gait belt for transfers/ambulation, Assess postural VS orthostatic hypotension    Elimination Interventions: Bed/chair exit alarm, Call light in reach, Patient to call for help with toileting needs, Toilet paper/wipes in reach, Toileting schedule/hourly rounds    History of Falls Interventions: Bed/chair exit alarm, Consult care management for discharge planning, Door open when patient unattended, Evaluate medications/consider consulting pharmacy, Investigate reason for fall, Utilize gait belt for transfer/ambulation, Room close to nurse's station

## 2018-08-26 NOTE — PROGRESS NOTES
[facility] Hospitalist Group  Progress Note    Patient: Nara Fitch Age: 80 y.o. : 1928 MR#: [id number] SSN: [id number]  Date/Time: 2018    Subjective:     Pt has no specific complaints. Daughter present at bedside. Assessment/Plan:   -Acute stroke L MCA territory, neuro input noted recommendations made for asa 325 mg for now and to start oac in near future. Pt reports that she had vaginal bleeding on oac in the past and is reluctant to start oac at this time. On MRA noted to have mild stenosis of the L distal M1. 2d echo, carotid echo pending. Pt has h/o afib and is not anticoagulated due to h/o vaginal bleeding.  -Vasogenic edema left caudate and basal ganglia mentioned on head CT done earlier but not MRI brain done subsequently  -Mild to moderate OP dysphagia SLP recommendations made for puree/nectar-thick, meds crushed. Pt to have MBS on Monday per SLP recs.   -Afib w/ RVR   -UTI on rocephin, per daughter has h/o recurrent UTI's  -H/o CAD -no active issues  -GERD  -Dyslipidemia on statin    PLAN:  -cont asa 325 mg daily, consider doing transvaginal us/gyn consult given h/o vaginal bleeding on oac before starting oac as pt has reluctance to start oac   -started on cardizem 30 mg tid for rate control  -PT/OT  - cont statin    Additional Notes:      Case discussed with:  [x]Patient  [x]Family  [x]Nursing  []Case Management  DVT Prophylaxis:  []Lovenox  []Hep SQ  [x]SCDs  []Coumadin   []On Heparin gtt    Objective:   VS:   Visit Vitals    /71 (BP 1 Location: Right arm, BP Patient Position: At rest)    Pulse 86    Temp 98 °F (36.7 °C)    Resp 18    Ht 5' (1.524 m)    Wt 56.7 kg (125 lb)    SpO2 96%    BMI 24.41 kg/m2      Tmax/24hrs: Temp (24hrs), Av.9 °F (36.6 °C), Min:97.3 °F (36.3 °C), Max:98.5 °F (36.9 °C)    Input/Output:   Intake/Output Summary (Last 24 hours) at 18 7351  Last data filed at 18 1645   Gross per 24 hour   Intake 600 ml   Output                0 ml   Net              600 ml       General: alert, awake, in nad   Cardiovascular: rrr, no murmurs   Pulmonary:ctab    GI: soft, nt, nd   Extremities:  No edema  Additional:  Neuro non focal w/ exception of dysmetria w/ right upper ext    Labs:    Recent Results (from the past 24 hour(s))   GLUCOSE, POC    Collection Time: 08/24/18 10:46 PM   Result Value Ref Range    Glucose (POC) 139 (H) 70 - 110 mg/dL   LIPID PANEL    Collection Time: 08/25/18  3:38 AM   Result Value Ref Range    LIPID PROFILE          Cholesterol, total 119 <200 MG/DL    Triglyceride 50 <150 MG/DL    HDL Cholesterol 73 (H) 40 - 60 MG/DL    LDL, calculated 36 0 - 100 MG/DL    VLDL, calculated 10 MG/DL    CHOL/HDL Ratio 1.6 0 - 5.0     HEMOGLOBIN A1C WITH EAG    Collection Time: 08/25/18  3:38 AM   Result Value Ref Range    Hemoglobin A1c 5.7 (H) 4.2 - 5.6 %    Est. average glucose 117 mg/dL   CBC WITH AUTOMATED DIFF    Collection Time: 08/25/18  3:38 AM   Result Value Ref Range    WBC 6.4 4.6 - 13.2 K/uL    RBC 3.72 (L) 4.20 - 5.30 M/uL    HGB 11.8 (L) 12.0 - 16.0 g/dL    HCT 34.2 (L) 35.0 - 45.0 %    MCV 91.9 74.0 - 97.0 FL    MCH 31.7 24.0 - 34.0 PG    MCHC 34.5 31.0 - 37.0 g/dL    RDW 13.0 11.6 - 14.5 %    PLATELET 563 008 - 004 K/uL    MPV 9.4 9.2 - 11.8 FL    NEUTROPHILS 78 (H) 40 - 73 %    LYMPHOCYTES 12 (L) 21 - 52 %    MONOCYTES 10 3 - 10 %    EOSINOPHILS 0 0 - 5 %    BASOPHILS 0 0 - 2 %    ABS. NEUTROPHILS 5.0 1.8 - 8.0 K/UL    ABS. LYMPHOCYTES 0.8 (L) 0.9 - 3.6 K/UL    ABS. MONOCYTES 0.7 0.05 - 1.2 K/UL    ABS. EOSINOPHILS 0.0 0.0 - 0.4 K/UL    ABS.  BASOPHILS 0.0 0.0 - 0.1 K/UL    DF AUTOMATED     METABOLIC PANEL, COMPREHENSIVE    Collection Time: 08/25/18  3:38 AM   Result Value Ref Range    Sodium 142 136 - 145 mmol/L    Potassium 3.3 (L) 3.5 - 5.5 mmol/L    Chloride 107 100 - 108 mmol/L    CO2 24 21 - 32 mmol/L    Anion gap 11 3.0 - 18 mmol/L    Glucose 126 (H) 74 - 99 mg/dL    BUN 37 (H) 7.0 - 18 MG/DL    Creatinine 1.07 0.6 - 1.3 MG/DL    BUN/Creatinine ratio 35 (H) 12 - 20      GFR est AA 59 (L) >60 ml/min/1.73m2    GFR est non-AA 48 (L) >60 ml/min/1.73m2    Calcium 8.0 (L) 8.5 - 10.1 MG/DL    Bilirubin, total 0.7 0.2 - 1.0 MG/DL    ALT (SGPT) 33 13 - 56 U/L    AST (SGOT) 30 15 - 37 U/L    Alk.  phosphatase 51 45 - 117 U/L    Protein, total 6.2 (L) 6.4 - 8.2 g/dL    Albumin 2.9 (L) 3.4 - 5.0 g/dL    Globulin 3.3 2.0 - 4.0 g/dL    A-G Ratio 0.9 0.8 - 1.7     TSH 3RD GENERATION    Collection Time: 08/25/18  3:38 AM   Result Value Ref Range    TSH 0.61 0.36 - 3.74 uIU/mL   GLUCOSE, POC    Collection Time: 08/25/18  6:52 AM   Result Value Ref Range    Glucose (POC) 140 (H) 70 - 110 mg/dL   GLUCOSE, POC    Collection Time: 08/25/18 11:44 AM   Result Value Ref Range    Glucose (POC) 250 (H) 70 - 110 mg/dL   GLUCOSE, POC    Collection Time: 08/25/18  4:44 PM   Result Value Ref Range    Glucose (POC) 219 (H) 70 - 110 mg/dL     Additional Data Reviewed:      Signed By: Nemesio Rivera MD     August 25, 2018 9:32 PM

## 2018-08-26 NOTE — PROGRESS NOTES
Paged kristian Samuel added for wheezing q 4hrs prn, advised of elevated blood sugars 155-198, Md will reassess      1830- output 400 ml urine + 2 episodes of incontinence

## 2018-08-26 NOTE — PROGRESS NOTES
Problem: Falls - Risk of  Goal: *Absence of Falls  Document Amita Fall Risk and appropriate interventions in the flowsheet. Outcome: Progressing Towards Goal  Fall Risk Interventions:  Mobility Interventions: Bed/chair exit alarm, Patient to call before getting OOB, PT Consult for mobility concerns, Utilize walker, cane, or other assistive device    Mentation Interventions: Bed/chair exit alarm, Door open when patient unattended, Increase mobility, Reorient patient, Room close to nurse's station    Medication Interventions: Bed/chair exit alarm, Patient to call before getting OOB, Teach patient to arise slowly    Elimination Interventions: Bed/chair exit alarm, Call light in reach, Patient to call for help with toileting needs, Toileting schedule/hourly rounds    History of Falls Interventions: Bed/chair exit alarm, Door open when patient unattended, Room close to nurse's station        Problem: Pressure Injury - Risk of  Goal: *Prevention of pressure injury  Document Noah Scale and appropriate interventions in the flowsheet.    Outcome: Progressing Towards Goal  Pressure Injury Interventions:  Sensory Interventions: Assess changes in LOC, Check visual cues for pain, Pressure redistribution bed/mattress (bed type)    Moisture Interventions: Absorbent underpads, Check for incontinence Q2 hours and as needed, Internal/External urinary devices    Activity Interventions: Increase time out of bed, Pressure redistribution bed/mattress(bed type), PT/OT evaluation    Mobility Interventions: HOB 30 degrees or less, Pressure redistribution bed/mattress (bed type), PT/OT evaluation    Nutrition Interventions: Document food/fluid/supplement intake    Friction and Shear Interventions: HOB 30 degrees or less

## 2018-08-26 NOTE — PROGRESS NOTES
Neurology Progress Note    Patient ID:  Deirdre Course  [id number]  51 y.o.  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 She is stable, tolerates oral puree food well. Pending rehab per PT/OT recs. Current Facility-Administered Medications   Medication Dose Route Frequency    sodium chloride (NS) flush 5-10 mL  5-10 mL IntraVENous Q8H    heparin (porcine) injection 5,000 Units  5,000 Units SubCUTAneous TID    acetaminophen (TYLENOL) tablet 650 mg  650 mg Oral Q8H PRN    0.45% sodium chloride infusion  100 mL/hr IntraVENous CONTINUOUS    dilTIAZem (CARDIZEM) IR tablet 30 mg  30 mg Oral TIDAC    lovastatin (MEVACOR) tablet 40 mg  40 mg Oral DAILY    sodium chloride (NS) flush 5-10 mL  5-10 mL IntraVENous PRN    aspirin (ASPIRIN) tablet 325 mg  325 mg Oral DAILY    labetalol (NORMODYNE;TRANDATE) injection 5 mg  5 mg IntraVENous Q10MIN PRN    bisacodyl (DULCOLAX) suppository 10 mg  10 mg Rectal DAILY PRN    cefTRIAXone (ROCEPHIN) 1 g in sterile water (preservative free) 10 mL IV syringe  1 g IntraVENous Q24H    famotidine (PEPCID) tablet 20 mg  20 mg Oral QPM          Objective: Active hospital medications were reviewed    Lab results and neuroradiology studies from the last 24 hours were reviewed. Prior to Admission medications    Medication Sig Start Date End Date Taking? Authorizing Provider   furosemide (LASIX) 20 mg tablet 1 tablet by mouth daily 8/24/18   Sandria Curling, MD   polyethylene glycol (MIRALAX) 17 gram packet Take 17 g by mouth daily. Historical Provider   promethazine (PHENERGAN) 25 mg tablet Take 1 Tab by mouth every six (6) hours as needed for Nausea. 7/17/18   Sandria Curling, MD   hydroCHLOROthiazide (MICROZIDE) 12.5 mg capsule Take 1 Cap by mouth daily. 7/16/18   Sandria Curling, MD   losartan (COZAAR) 50 mg tablet Take 1 Tab by mouth daily.  5/31/18 Candi Wyman MD   tolterodine (DETROL) 2 mg tablet Take 1 Tab by mouth two (2) times a day. 1/15/18   Candi Wyman MD   naproxen sodium (ALEVE) 220 mg cap Take  by mouth. Historical Provider   carvedilol (COREG) 12.5 mg tablet take 1 tablet by mouth twice a day 10/31/17   Rosalba Patel NP   lovastatin (MEVACOR) 20 mg tablet Take 1 Tab by mouth daily. 8/31/17   Candi Wyman MD   multivitamin (ONE A DAY) tablet Take 1 Tab by mouth daily. Historical Provider   Lactobacillus acidophilus (ACIDOPHILUS) cap Take 2 Caps by mouth two (2) times a day. Historical Provider   omeprazole (PRILOSEC) 20 mg capsule Take 20 mg by mouth daily as needed. Historical Provider   ascorbic acid (VITAMIN C) 250 mg tablet Take 1 Tab by mouth two (2) times daily (with meals). [Request refills from PCP (Dr. Tiffany Chapman)] 6/17/14   Candi Wyman MD   cholecalciferol (VITAMIN D3) 1,000 unit tablet Take 2 Tabs by mouth daily. [Request refills from PCP (Dr. Tiffany Chapman)] 6/6/14   Evelyne Lu MD   aspirin 81 mg tablet Take 81 mg by mouth daily. Historical Provider   DOCOSAHEXANOIC ACID/EPA (FISH OIL PO) Take 3 Tabs by mouth daily.  2/21/11   Historical Provider     Patient Vitals for the past 8 hrs:   BP Temp Pulse Resp SpO2 Height Weight   08/26/18 1200 123/74 97.6 °F (36.4 °C) 71 17 96 % - -   08/26/18 0949 132/81 - - - - 5' (1.524 m) 57.6 kg (127 lb)   08/26/18 0813 132/81 97.5 °F (36.4 °C) 86 18 96 % - -   [id number]/24 1901 - 08/26 0700  In: 600 [P.O.:600]  Out: -   08/24 1901 - 08/26 0700  In: 0 [P.O.:600]  Out: - RESULTRCNT(24h)Principal Problem:    CVA (cerebral vascular accident) ([address] (8/24/2018)    Active Problems:    Confusion (8/24/2018)      UTI (urinary tract infection) (8/24/2018)      Essential hypertension (8/24/2018)      Dehydration (8/24/2018)      Vasogenic brain edema ([facility] (8/25/2018)        Additional comments:I reviewed the patient's new clinical lab test results. General Exam  No acute distress, mucous membranes normal color and hydration status    Neurologic Exam    Mental status:  Alert, oriented to person, place, time and circumstance  No visual spatial neglect or overt apraxia  Language: normal fluency and comprehension     Cranial nerves: PERRL, Extraocular movements intact and full, intact sensation bilat V1-V3 distribution, face is with mild asymmetry on the right,  Tongue midline with normal strength, palat symmetric     Motor: strength 4/5 throughout, there seem to be slightly more weakness on the right UE. No abnormal movements     Coordination: Normal finger-nose-finger     DTRs (R/L)  Biceps: (2/2)  Brachorad (2/2)  Triceps: (2/2)   Patellar (1/1)  Ankles (1/1)      Sensation: Intact and symmetric to light touch, temperature      Gait: not tested      Assessment:     Edwin Hammond is a 80 y.o.  woman with hx of HTN, atrial fibrillation not on anticoagulation with acute left basal ganglia stroke  and portions of the periinsular and opercular cortices. I suspect a cardio-embolic etiology, therfore I  recommend starting anticoagulation in 5--5 days (around 30 Aug)     Plan:     - Continue ASA  But stop if starts anticoag (for ex apixaban 2.5 mg BID due to age)  - Avoid SBP < 120-130  - A fib management per primary team  - pending rehab   - will sign off as no new rec , please call if questions.      Signed:  Gabi Benjamin MD  Adult Neurologist  8/26/2018  1:39 PM

## 2018-08-26 NOTE — ROUTINE PROCESS
Bedside shift change report given to Nehemias Daugherty (oncoming nurse) by Micheal Candelario (offgoing nurse). Report included the following information SBAR, Intake/Output, MAR, Recent Results and Cardiac Rhythm NSR.

## 2018-08-26 NOTE — PROGRESS NOTES
[facility] Hospitalist Group  Progress Note    Patient: Vadim Moulton Age: 80 y.o. : 1928 MR#: [id number] SSN: [id number]  Date/Time: 2018    Subjective:     Pt tearful. Daughter and nurse present at bedside. Overall pt upset about her new onset debility especially affecting her right hand. Assessment/Plan:   -Acute stroke L MCA territory, neuro input noted recommendations made for asa 325 mg for now and to start oac in near future. Pt reports that she has had vaginal bleeding on oac in the past and is reluctant to start oac at this time. On MRA noted to have mild stenosis of the L distal M1. 2d echo, carotid echo no shunt present, no reports about embolic source. Pt has h/o afib and is not anticoagulated due to h/o vaginal bleeding. Discussed w/ gyn, recommend transvaginal us and gyn will see pt. -Vasogenic edema left caudate and basal ganglia mentioned on head CT done earlier but not MRI brain done subsequently  -Mild to moderate OP dysphagia SLP recommendations made for puree/nectar-thick, meds crushed  -Afib w/ RVR- rate controlled with po cardizem presently. -UTI on rocephin day #3 today, per daughter has h/o recurrent UTI's. Has grown MRSA in the past in urine cx as well as Klebsiella sensitive to rocephin  -H/o CAD -no active issues  -GERD  -Dyslipidemia on statin    PLAN:  -cont asa 325 mg daily  - transvaginal us  -gyn consult given    -cont cardizem 30 mg tid for rate control  -cont PT/OT  - cont statin  -will finish third dose of rocephin. If pt continues to by symptomatic, will send urine for cx and treat for MRSA UTI.   Additional Notes:      Case discussed with:  [x]Patient  [x]Family  [x]Nursing  []Case Management  DVT Prophylaxis:  []Lovenox  []Hep SQ  [x]SCDs  []Coumadin   []On Heparin gtt    Objective:   VS:   Visit Vitals    /74 (BP 1 Location: Right arm, BP Patient Position: At rest)    Pulse 71    Temp 97.6 °F (36.4 °C)    Resp 17    Ht 5' (1.524 m)    Wt 57.6 kg (127 lb)    SpO2 96%    BMI 24.8 kg/m2      Tmax/24hrs: Temp (24hrs), Av.8 °F (36.6 °C), Min:97 °F (36.1 °C), Max:98.2 °F (36.8 °C)    Input/Output:     Intake/Output Summary (Last 24 hours) at 18 1550  Last data filed at 18 1645   Gross per 24 hour   Intake              240 ml   Output                0 ml   Net              240 ml       General: alert, awake, in nad   Cardiovascular: rrr, no murmurs   Pulmonary:ctab    GI: soft, nt, nd   Extremities:  No edema  Additional:  Neuro non focal w/ exception of dysmetria w/ right upper ext    Labs:    Recent Results (from the past 24 hour(s))   GLUCOSE, POC    Collection Time: 18  4:44 PM   Result Value Ref Range    Glucose (POC) 219 (H) 70 - 110 mg/dL   GLUCOSE, POC    Collection Time: 18  9:36 PM   Result Value Ref Range    Glucose (POC) 225 (H) 70 - 110 mg/dL   CBC WITH AUTOMATED DIFF    Collection Time: 18  2:15 AM   Result Value Ref Range    WBC 8.8 4.6 - 13.2 K/uL    RBC 3.65 (L) 4.20 - 5.30 M/uL    HGB 11.8 (L) 12.0 - 16.0 g/dL    HCT 33.8 (L) 35.0 - 45.0 %    MCV 92.6 74.0 - 97.0 FL    MCH 32.3 24.0 - 34.0 PG    MCHC 34.9 31.0 - 37.0 g/dL    RDW 13.5 11.6 - 14.5 %    PLATELET 040 849 - 691 K/uL    MPV 9.8 9.2 - 11.8 FL    NEUTROPHILS 85 (H) 40 - 73 %    LYMPHOCYTES 8 (L) 21 - 52 %    MONOCYTES 7 3 - 10 %    EOSINOPHILS 0 0 - 5 %    BASOPHILS 0 0 - 2 %    ABS. NEUTROPHILS 7.6 1.8 - 8.0 K/UL    ABS. LYMPHOCYTES 0.7 (L) 0.9 - 3.6 K/UL    ABS. MONOCYTES 0.6 0.05 - 1.2 K/UL    ABS. EOSINOPHILS 0.0 0.0 - 0.4 K/UL    ABS.  BASOPHILS 0.0 0.0 - 0.1 K/UL    DF AUTOMATED     METABOLIC PANEL, COMPREHENSIVE    Collection Time: 18  2:15 AM   Result Value Ref Range    Sodium 141 136 - 145 mmol/L    Potassium 5.0 3.5 - 5.5 mmol/L    Chloride 108 100 - 108 mmol/L    CO2 26 21 - 32 mmol/L    Anion gap 7 3.0 - 18 mmol/L    Glucose 163 (H) 74 - 99 mg/dL    BUN 36 (H) 7.0 - 18 MG/DL    Creatinine 1.21 0.6 - 1.3 MG/DL BUN/Creatinine ratio 30 (H) 12 - 20      GFR est AA 51 (L) >60 ml/min/1.73m2    GFR est non-AA 42 (L) >60 ml/min/1.73m2    Calcium 7.9 (L) 8.5 - 10.1 MG/DL    Bilirubin, total 0.4 0.2 - 1.0 MG/DL    ALT (SGPT) 32 13 - 56 U/L    AST (SGOT) 32 15 - 37 U/L    Alk.  phosphatase 54 45 - 117 U/L    Protein, total 6.4 6.4 - 8.2 g/dL    Albumin 3.1 (L) 3.4 - 5.0 g/dL    Globulin 3.3 2.0 - 4.0 g/dL    A-G Ratio 0.9 0.8 - 1.7     GLUCOSE, POC    Collection Time: 08/26/18  6:51 AM   Result Value Ref Range    Glucose (POC) 143 (H) 70 - 110 mg/dL   ECHO ADULT COMPLETE    Collection Time: 08/26/18 12:24 PM   Result Value Ref Range    LA Volume 70.94 22 - 52 mL    Tapse 1.19 (A) 1.5 - 2.0 cm    Ao Root D 2.81 cm    AO ASC D 2.97 cm    LVIDd 3.93 3.9 - 5.3 cm    LVPWd 0.82 0.6 - 0.9 cm    LVIDs 2.44 cm    IVSd 0.78 0.6 - 0.9 cm    LVOT d 1.76 cm    LVOT Peak Velocity 73.95 cm/s    LVOT Peak Gradient 2.2 mmHg    LVOT VTI 17.09 cm    Left Ventricle Isovolumic Relaxation Time 49.2 ms    Mitral Valve Area by Proximal Isovelocity Surface Area 0.1 cm2    MV A Eddie 38.38 cm/s    MV E Eddie 1.13 cm/s    MV E/A 0.03     Mitral Effective Regurgitant Orifice Area 0.10 cm2    LA Vol 4C 70.57 (A) 22 - 52 mL    LA Vol 2C 60.24 (A) 22 - 52 mL    LA Area 4C 23.2 cm2    MV regurgitant volume 11.62 cc    Mitral Regurgitant Velocity Time Integral 163.79 cm    Mitral Regurgitant Peak Velocity 486.72 cm/s    Mitral Valve E Wave Deceleration Time 125.0 ms    Triscuspid Valve Regurgitation Peak Gradient 38.3 mmHg    TR Max Velocity 309.57 cm/s    PISA MR Rad 0.38 cm    LA Vol Index 46.10 ml/m2    LA Vol Index 39.14 ml/m2    LA Vol Index 45.86 ml/m2    MR Peak Gradient 94.8 mmHg   GLUCOSE, POC    Collection Time: 08/26/18 12:42 PM   Result Value Ref Range    Glucose (POC) 198 (H) 70 - 110 mg/dL     Additional Data Reviewed:      Signed By: Ernesto Dutton MD     August 26, 2018 9:32 PM

## 2018-08-27 ENCOUNTER — TELEPHONE (OUTPATIENT)
Dept: INTERNAL MEDICINE CLINIC | Age: 83
End: 2018-08-27

## 2018-08-27 ENCOUNTER — APPOINTMENT (OUTPATIENT)
Dept: VASCULAR SURGERY | Age: 83
DRG: 064 | End: 2018-08-27
Attending: INTERNAL MEDICINE
Payer: MEDICARE

## 2018-08-27 ENCOUNTER — APPOINTMENT (OUTPATIENT)
Dept: GENERAL RADIOLOGY | Age: 83
DRG: 064 | End: 2018-08-27
Attending: INTERNAL MEDICINE
Payer: MEDICARE

## 2018-08-27 ENCOUNTER — APPOINTMENT (OUTPATIENT)
Dept: ULTRASOUND IMAGING | Age: 83
DRG: 064 | End: 2018-08-27
Attending: INTERNAL MEDICINE
Payer: MEDICARE

## 2018-08-27 LAB
APPEARANCE UR: CLEAR
BACTERIA URNS QL MICRO: ABNORMAL /HPF
BASOPHILS # BLD: 0 K/UL (ref 0–0.1)
BASOPHILS NFR BLD: 0 % (ref 0–2)
BILIRUB UR QL: NEGATIVE
COLOR UR: YELLOW
DIFFERENTIAL METHOD BLD: ABNORMAL
EOSINOPHIL # BLD: 0 K/UL (ref 0–0.4)
EOSINOPHIL NFR BLD: 0 % (ref 0–5)
EPITH CASTS URNS QL MICRO: ABNORMAL /LPF (ref 0–5)
ERYTHROCYTE [DISTWIDTH] IN BLOOD BY AUTOMATED COUNT: 13.7 % (ref 11.6–14.5)
GLUCOSE BLD STRIP.AUTO-MCNC: 91 MG/DL (ref 70–110)
GLUCOSE UR STRIP.AUTO-MCNC: 100 MG/DL
HCT VFR BLD AUTO: 35.7 % (ref 35–45)
HGB BLD-MCNC: 11.9 G/DL (ref 12–16)
HGB UR QL STRIP: NEGATIVE
KETONES UR QL STRIP.AUTO: NEGATIVE MG/DL
LEFT CCA DIST DIAS: 11.98 CM/S
LEFT CCA DIST SYS: 48.25 CM/S
LEFT CCA MID DIAS: 16.38 CM/S
LEFT CCA MID SYS: 60.34 CM/S
LEFT CCA PROX DIAS: 12.76 CM/S
LEFT CCA PROX SYS: 56.37 CM/S
LEFT ECA DIAS: 5.39 CM/S
LEFT ECA SYS: 52.65 CM/S
LEFT ICA DIST DIAS: 18.28 CM/S
LEFT ICA DIST SYS: 65.23 CM/S
LEFT ICA MID DIAS: 20.48 CM/S
LEFT ICA MID SYS: 58.81 CM/S
LEFT ICA PROX DIAS: 14.17 CM/S
LEFT ICA PROX SYS: 48.24 CM/S
LEFT SUBCLAVIAN SYS: 53 CM/S
LEFT VERTEBRAL DIAS: 8.83 CM/S
LEFT VERTEBRAL SYS: 35.54 CM/S
LEUKOCYTE ESTERASE UR QL STRIP.AUTO: ABNORMAL
LYMPHOCYTES # BLD: 1.4 K/UL (ref 0.9–3.6)
LYMPHOCYTES NFR BLD: 15 % (ref 21–52)
MCH RBC QN AUTO: 32.1 PG (ref 24–34)
MCHC RBC AUTO-ENTMCNC: 33.3 G/DL (ref 31–37)
MCV RBC AUTO: 96.2 FL (ref 74–97)
MONOCYTES # BLD: 1 K/UL (ref 0.05–1.2)
MONOCYTES NFR BLD: 10 % (ref 3–10)
NEUTS SEG # BLD: 7 K/UL (ref 1.8–8)
NEUTS SEG NFR BLD: 75 % (ref 40–73)
NITRITE UR QL STRIP.AUTO: NEGATIVE
PH UR STRIP: 8 [PH] (ref 5–8)
PLATELET # BLD AUTO: 138 K/UL (ref 135–420)
PMV BLD AUTO: 9.9 FL (ref 9.2–11.8)
PROT UR STRIP-MCNC: NEGATIVE MG/DL
RBC # BLD AUTO: 3.71 M/UL (ref 4.2–5.3)
RBC #/AREA URNS HPF: ABNORMAL /HPF (ref 0–5)
RIGHT CCA DIST DIAS: 11.52 CM/S
RIGHT CCA DIST SYS: 49.05 CM/S
RIGHT CCA MID DIAS: 10.89 CM/S
RIGHT CCA MID SYS: 53.9 CM/S
RIGHT CCA PROX DIAS: 12.81 CM/S
RIGHT CCA PROX SYS: 61.99 CM/S
RIGHT ECA DIAS: 6.34 CM/S
RIGHT ECA SYS: 50.35 CM/S
RIGHT ICA DIST DIAS: 27.29 CM/S
RIGHT ICA DIST SYS: 112.89 CM/S
RIGHT ICA MID DIAS: 13 CM/S
RIGHT ICA MID SYS: 39.17 CM/S
RIGHT ICA PROX DIAS: 12.13 CM/S
RIGHT ICA PROX SYS: 36.55 CM/S
RIGHT SUBCLAVIAN SYS: 83 CM/S
RIGHT VERTEBRAL DIAS: 8.47 CM/S
RIGHT VERTEBRAL SYS: 31.25 CM/S
SP GR UR REFRACTOMETRY: 1.01 (ref 1–1.03)
UROBILINOGEN UR QL STRIP.AUTO: 0.2 EU/DL (ref 0.2–1)
WBC # BLD AUTO: 9.4 K/UL (ref 4.6–13.2)
WBC URNS QL MICRO: ABNORMAL /HPF (ref 0–4)

## 2018-08-27 PROCEDURE — 93880 EXTRACRANIAL BILAT STUDY: CPT

## 2018-08-27 PROCEDURE — 82962 GLUCOSE BLOOD TEST: CPT

## 2018-08-27 PROCEDURE — 65660000000 HC RM CCU STEPDOWN

## 2018-08-27 PROCEDURE — 81001 URINALYSIS AUTO W/SCOPE: CPT | Performed by: INTERNAL MEDICINE

## 2018-08-27 PROCEDURE — 74011250637 HC RX REV CODE- 250/637: Performed by: INTERNAL MEDICINE

## 2018-08-27 PROCEDURE — 85025 COMPLETE CBC W/AUTO DIFF WBC: CPT | Performed by: INTERNAL MEDICINE

## 2018-08-27 PROCEDURE — 74011000255 HC RX REV CODE- 255: Performed by: INTERNAL MEDICINE

## 2018-08-27 PROCEDURE — 76856 US EXAM PELVIC COMPLETE: CPT

## 2018-08-27 PROCEDURE — 97535 SELF CARE MNGMENT TRAINING: CPT

## 2018-08-27 PROCEDURE — 74011250636 HC RX REV CODE- 250/636: Performed by: INTERNAL MEDICINE

## 2018-08-27 PROCEDURE — 97530 THERAPEUTIC ACTIVITIES: CPT

## 2018-08-27 PROCEDURE — 92526 ORAL FUNCTION THERAPY: CPT

## 2018-08-27 PROCEDURE — 92611 MOTION FLUOROSCOPY/SWALLOW: CPT

## 2018-08-27 PROCEDURE — 36415 COLL VENOUS BLD VENIPUNCTURE: CPT | Performed by: INTERNAL MEDICINE

## 2018-08-27 PROCEDURE — 97116 GAIT TRAINING THERAPY: CPT

## 2018-08-27 PROCEDURE — 74230 X-RAY XM SWLNG FUNCJ C+: CPT

## 2018-08-27 RX ORDER — FUROSEMIDE 40 MG/1
40 TABLET ORAL ONCE
Status: CANCELLED | OUTPATIENT
Start: 2018-08-27 | End: 2018-08-28

## 2018-08-27 RX ADMIN — BARIUM SULFATE 15 G: 960 POWDER, FOR SUSPENSION ORAL at 13:00

## 2018-08-27 RX ADMIN — ASPIRIN 325 MG ORAL TABLET 325 MG: 325 PILL ORAL at 08:32

## 2018-08-27 RX ADMIN — DILTIAZEM HYDROCHLORIDE 30 MG: 30 TABLET, FILM COATED ORAL at 17:33

## 2018-08-27 RX ADMIN — HEPARIN SODIUM 5000 UNITS: 5000 INJECTION, SOLUTION INTRAVENOUS; SUBCUTANEOUS at 18:18

## 2018-08-27 RX ADMIN — BARIUM SULFATE 15 ML: 400 PASTE ORAL at 13:00

## 2018-08-27 RX ADMIN — BARIUM SULFATE 30 ML: 400 SUSPENSION ORAL at 13:00

## 2018-08-27 RX ADMIN — Medication 10 ML: at 21:07

## 2018-08-27 RX ADMIN — Medication 10 ML: at 18:24

## 2018-08-27 RX ADMIN — LOVASTATIN 40 MG: 20 TABLET ORAL at 08:32

## 2018-08-27 RX ADMIN — BARIUM SULFATE 15 ML: 400 SUSPENSION ORAL at 13:00

## 2018-08-27 RX ADMIN — DILTIAZEM HYDROCHLORIDE 30 MG: 30 TABLET, FILM COATED ORAL at 08:32

## 2018-08-27 RX ADMIN — FAMOTIDINE 20 MG: 20 TABLET ORAL at 18:18

## 2018-08-27 RX ADMIN — HEPARIN SODIUM 5000 UNITS: 5000 INJECTION, SOLUTION INTRAVENOUS; SUBCUTANEOUS at 03:06

## 2018-08-27 NOTE — PROGRESS NOTES
[facility] Hospitalist Group  Progress Note    Patient: Misael Lewis Age: 80 y.o. : 1928 MR#: [id number] SSN: [id number]  Date/Time: 2018    Subjective:     Pt states she feels    Assessment/Plan:   -Acute stroke L MCA territory, neuro input noted recommendations made for asa 325 mg for now and to start oac in near future. Pt reports that she has had vaginal bleeding on oac in the past and is reluctant to start oac at this time. On MRA noted to have mild stenosis of the L distal M1. 2d echo, carotid echo no shunt present, no reports about embolic source. Pt has h/o afib and is not anticoagulated due to h/o vaginal bleeding. Transvaginal us shows absence of uterus and ovaries, no mass, given this result will proceed w/ anticoagulation w/ oac.  -Vasogenic edema left caudate and basal ganglia mentioned on head CT done earlier but not MRI brain done subsequently  -Mild to moderate OP dysphagia , s/p MBS , recommendations made for NPO status and alternate means of nourishment vs comfort feeds. Discussed at length w/ daughter. -Afib w/ RVR- rate controlled with po cardizem presently. -UTI on rocephin day #3 today, per daughter has h/o recurrent UTI's. Has grown MRSA in the past in urine cx as well as Klebsiella sensitive to rocephin. Finished third dose of rocephin. If pt continues to by symptomatic, will send urine for cx and treat for MRSA UTI.    -H/o CAD -no active issues  -GERD  -Dyslipidemia on statin    PLAN:  -Daughter to speak to pt about options of comfort feeds vs PEG tube, will keep NPO for now.  -cont asa 325 mg daily, will start oac after discussion w/ pt and daughter   -cont cardizem 30 mg tid for rate control  -cont PT/OT  - cont statin    Dispo: pt recommends inpt rehab. Discussed w/ CM.  CM states inpt rehab liaison aware    Additional Notes:      Case discussed with:  [x]Patient  [x]Family  [x]Nursing  []Case Management  DVT Prophylaxis:  []Lovenox  []Hep SQ  [x]SCDs  []Coumadin   []On Heparin gtt    Objective:   VS:   Visit Vitals    /79 (BP 1 Location: Right arm, BP Patient Position: At rest)    Pulse 89    Temp 98.6 °F (37 °C)    Resp 18    Ht 5' (1.524 m)    Wt 57.6 kg (127 lb)    SpO2 95%    BMI 24.8 kg/m2      Tmax/24hrs: Temp (24hrs), Av.8 °F (36.6 °C), Min:97.5 °F (36.4 °C), Max:98.6 °F (37 °C)    Input/Output:     Intake/Output Summary (Last 24 hours) at 18 1519  Last data filed at 18 1405   Gross per 24 hour   Intake           628.33 ml   Output             1700 ml   Net         -1071.67 ml       General: alert, awake, in nad   Cardiovascular: rrr, no murmurs   Pulmonary:ctab    GI: soft, nt, nd   Extremities:  No edema  Additional:  Neuro grossly normal  Labs:    Recent Results (from the past 24 hour(s))   GLUCOSE, POC    Collection Time: 18  3:56 PM   Result Value Ref Range    Glucose (POC) 155 (H) 70 - 110 mg/dL   GLUCOSE, POC    Collection Time: 18  9:33 PM   Result Value Ref Range    Glucose (POC) 114 (H) 70 - 110 mg/dL   CBC WITH AUTOMATED DIFF    Collection Time: 18  1:30 AM   Result Value Ref Range    WBC 9.4 4.6 - 13.2 K/uL    RBC 3.71 (L) 4.20 - 5.30 M/uL    HGB 11.9 (L) 12.0 - 16.0 g/dL    HCT 35.7 35.0 - 45.0 %    MCV 96.2 74.0 - 97.0 FL    MCH 32.1 24.0 - 34.0 PG    MCHC 33.3 31.0 - 37.0 g/dL    RDW 13.7 11.6 - 14.5 %    PLATELET 092 498 - 679 K/uL    MPV 9.9 9.2 - 11.8 FL    NEUTROPHILS 75 (H) 40 - 73 %    LYMPHOCYTES 15 (L) 21 - 52 %    MONOCYTES 10 3 - 10 %    EOSINOPHILS 0 0 - 5 %    BASOPHILS 0 0 - 2 %    ABS. NEUTROPHILS 7.0 1.8 - 8.0 K/UL    ABS. LYMPHOCYTES 1.4 0.9 - 3.6 K/UL    ABS. MONOCYTES 1.0 0.05 - 1.2 K/UL    ABS. EOSINOPHILS 0.0 0.0 - 0.4 K/UL    ABS.  BASOPHILS 0.0 0.0 - 0.1 K/UL    DF AUTOMATED     GLUCOSE, POC    Collection Time: 18  7:30 AM   Result Value Ref Range    Glucose (POC) 91 70 - 110 mg/dL   DUPLEX CAROTID BILATERAL    Collection Time: 18  3:15 PM   Result Value Ref Range    Right cca dist sys 49.05 cm/s    Right CCA dist mattson 11.52 cm/s    RIGHT COMMON CAROTID ARTERY MID S 53.90 cm/s    RIGHT COMMON CAROTID ARTERY MID D 10.89 cm/s    Right CCA prox sys 61.99 cm/s    Right CCA prox mattson 12.81 cm/s    Right eca sys 50.35 cm/s    RIGHT EXTERNAL CAROTID ARTERY D 6.34 cm/s    Right ICA dist sys 112.89 cm/s    Right ICA dist mattson 27.29 cm/s    Right ICA mid sys 39.17 cm/s    Right ICA mid mattson 13.00 cm/s    Right ICA prox sys 36.55 cm/s    Right ICA prox mattson 12.13 cm/s    Right vertebral sys 31.25 cm/s    RIGHT VERTEBRAL ARTERY D 8.47 cm/s    Left CCA dist sys 48.25 cm/s    Left CCA dist mattson 11.98 cm/s    LEFT COMMON CAROTID ARTERY MID S 60.34 cm/s    LEFT COMMON CAROTID ARTERY MID D 16.38 cm/s    Left CCA prox sys 56.37 cm/s    Left CCA prox matston 12.76 cm/s    Left ECA sys 52.65 cm/s    LEFT EXTERNAL CAROTID ARTERY D 5.39 cm/s    Left ICA dist sys 65.23 cm/s    Left ICA dist mattson 18.28 cm/s    Left ICA mid sys 58.81 cm/s    Left ICA mid mattson 20.48 cm/s    Left ICA prox sys 48.24 cm/s    Left ICA prox mattson 14.17 cm/s    Left vertebral sys 35.54 cm/s    LEFT VERTEBRAL ARTERY D 8.83 cm/s     Additional Data Reviewed:      Signed By: Theodore Mcgill MD     August 27, 2018 9:32 PM

## 2018-08-27 NOTE — PROGRESS NOTES
Problem: Dysphagia (Adult)  Goal: *Acute Goals and Plan of Care (Insert Text)  Patient will:  1. Tolerate PO trials with 0 s/s overt distress in 4/5 trials  2. Utilize compensatory swallow strategies/maneuvers (decrease bite/sip, size/rate, alt. liq/sol) with min cues in 4/5 trials  3. Perform oral-motor/laryngeal exercises to increase oropharyngeal swallow function with min cues  4. Complete an objective swallow study (i.e., MBSS) to assess swallow integrity, r/o aspiration, and determine of safest LRD, min A- met 8/27/18    Recommend:   NPO with alternate means of nutrition/hydration vs comfort feeds  Strict aspiration precautions (HOB >30 degrees at all times, Oral care TID)          Outcome: Progressing Towards Goal  Speech Pathology Modified barium swallow Study    Patient: Orquidea Castillo (89 y.o. female)  Date: 8/27/2018  Primary Diagnosis: CVA (cerebral vascular accident) ([facility] [address]  UTI (urinary tract infection)  Confusion        Precautions: aspiration       ASSESSMENT :  Based on the objective data described below, the patient presents with mild-mod oral and sev pharyngeal dysphagia c/b aspiration with cough with thin liquids and penetration laryngeal vestibule with all PO presentations. Small bites/sips with effortful swallow ineffective at improving airway protection. Deficits include decreased tongue base retraction, poor laryngeal elevation, and impaired pharyngeal motility/sensation. Mod vallecular/pyriform residuals remained in pharyngeal cavity post swallow putting pt at high risk for further airway compromise. Pt currently at high risk for aspiration, malnutrition, and dehydration with PO. Rec NPO with consideration of an alternate means of nutrition/hydration vs comfort feeds, aspiration precautions, and oral care TID. ST will continue to follow. Patient will benefit from skilled intervention to address the above impairments.   Patients rehabilitation potential is considered to be Fair  Factors which may influence rehabilitation potential include:   [x]              Medical condition     PLAN :  Recommendations and Planned Interventions: See above  Frequency/Duration: Patient will be followed by speech-language pathology 1-2 times per day/3-5 days per week to address goals. Discharge Recommendations: Inpatient Rehab and To Be Determined     SUBJECTIVE:   Patient stated It just seems like its one thing after another. OBJECTIVE:     Past Medical History:   Diagnosis Date    Allergic rhinitis     Anemia     Asymptomatic carotid bruit     asymptomatic    Atrial fibrillation ([facility] [address] 04/2017    Started on Xarelto    Atrial fibrillation ([facility] [address]     Cardiac cath 12/15/2010    pRCA 30%. LM patent. LAD 25%. CX 30%. LVEDP 10 mmHg. EF 60%. Mild AS.  Cardiac nuclear imaging test, low to mod risk 02/22/2016    Low to intermediate risk. Sm reversible inferoapical defect may be a sm area of ischemia. No prior infarction. EF >75%. Neg EKG on pharm stress test.    Carotid duplex 02/10/2014    Mild 1-49% bilateral ICA stenosis.      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 Victoria Em Hypertension     Lung collapse Dec 2011    Collapse of left upper lobe    Mild aortic stenosis 8/22/2011    Osteoarthritis     Osteoporosis     Positive PPD      Past Surgical History:   Procedure Laterality Date    HX CATARACT REMOVAL  HX COLONOSCOPY      HX HEART CATHETERIZATION  12/15/10    negative    HX HYSTERECTOMY      HX KNEE ARTHROSCOPY      HX PTCA  3/1995    of the LAD; has had no recurrent angina since    HX TONSILLECTOMY      UT BRONCHOSCOPY BRONCHIAL/ENDOBRNCL BX 1+ SITES  9/25/2012          Prior Level of Function/Home Situation: private residence  [address]: Private residence  [address] [place] [address] St: One story  Living Alone: Yes  Support Systems: Child(pauline)  Patient Expects to be Discharged to[de-identified] Unknown  Current DME Used/Available at Home: tessie Sutton Walker  Diet prior to admission: unknown  Current Diet:  NPO with consideration of an alternate means of nutrition/hydration vs comfort feeds   Radiologist:    Film Views: Lateral;Fluoro  Patient Position: 90 in fluoro chair    Trial 1: Trial 2:   Consistency Presented: Thin liquid Consistency Presented: Nectar thick liquid;Honey thick liquid;Puree   How Presented: Self-fed/presented;Cup/sip How Presented: Self-fed/presented;Cup/sip;Spoon;Straw         Bolus Acceptance: No impairment Bolus Acceptance: No impairment   Bolus Formation/Control: Impaired: Premature spillage;Delayed Bolus Formation/Control: Impaired: Delayed;Premature spillage; Incomplete   Propulsion: Delayed (# of seconds); Discoordination Propulsion: Delayed (# of seconds)   Oral Residue: None Oral Residue: None   Initiation of Swallow: Triggered at pyriform sinus(es) Initiation of Swallow: Triggered at pyriform sinus(es);Triggered at valleculae   Timing: No impairment Timing: No impairment   Penetration: None Penetration: During swallow; After swallow; To laryngeal vestibule   Aspiration/Timing: During Aspiration/Timing: No evidence of aspiration (at risk)   Pharyngeal Clearance: Vallecular residue;Pyriform residue ;10-50% Pharyngeal Clearance: Vallecular residue;Pyriform residue ;10-50%   Attempted Modifications: Small sips and bites;Effortful swallow Attempted Modifications: Double swallow;Effortful swallow;Small sips and bites   Effective Modifications: None Effective Modifications: None   Cues for Modifications: Minimal-moderate Cues for Modifications: Moderate           Decreased Tongue Base Retraction?: Yes  Laryngeal Elevation: Reduced excursion with laryngeal vestibule gap;Minimal movement of larynx/epiglottis  Aspiration/Penetration Score: 7 (Aspiration-Contrast passes below the cords/, but is not ejected despite attempt)  Pharyngeal Symmetry: Not assessed  Pharyngeal-Esophageal Segment: No impairment  Pharyngeal Dysfunction: Decreased strength;Decreased tongue base retraction;Decreased elevation/closure;Decreased pharyngeal wall constriction    Oral Phase Severity: Mild-moderate  Pharyngeal Phase Severity: Severe     Treatment Post MBS:  Treatment provided post diagnostic testing including oropharyngeal anatomy/physiology, MBS results, risk of aspiration with PO, and recs of NPO with an alternate means of nutrition/hydration vs comfort feeds. Pt verbalized understanding and was tearful during education. GCODESwallowing:  Swallow Current Status CN= 100%   Swallow Goal Status CM= 80-99%    The severity rating is based on the following outcomes:    8-point Penetration-Aspiration Scale: Score 7  Professional Judgment    PAIN:  Pt reports 0/10 pain or discomfort prior to MBS. Pt reports 0/10 pain or discomfort post MBS. COMMUNICATION/EDUCATION:   [x]  Patient educated regarding MBS results and diet recommendations. [x]  Patient/family have participated as able in goal setting and plan of care.     Thank you for this referral.    Suhail Scanlon M.S. CCC-SLP/L  Speech-Language Pathologist

## 2018-08-27 NOTE — INTERDISCIPLINARY ROUNDS
Interdisciplinary STROKE Rounds       Recommendations:     Recommendations are as follows:   1. Discontinue q4 NIHSS per Dr. Juan Khoury. 2. Continue education about stroke risk factors, stroke recognition and medication compliance. 3. Neuro signed off. Recommends stopping ASA if pt begins apixaban and avoid SBP lower than 120-130. Stroke Meds currently prescribed: , Lovastatin 40 mg  DVT prophylaxis: heparin sq    Tests ordered:Modified barium swallow, duplex    Discharge Plan: PT recommends ARU, OT recommends rehab    Patient admitted for: acute CVA after being found down undeterminate time. Advanced Imaging done MRI 8/25 shows Findings consistent with acute left MCA territory ischemic change involving basal ganglia/caudate, and portions of the periinsular and opercular cortices      Discipline Participation:   Interdisciplinary rounds were conducted by:  Dr. Sneha Mckeon, Neuroscience Medical director,   Bonifacio Hess RN, stroke coordinator  Sergey Craig, SLP,  Felicia Davis, ARU liaison,  Weston Koenig, the patient's nurse. Discussion included patient's current condition, any tests and patient's expected discharge outcome.

## 2018-08-27 NOTE — PROGRESS NOTES
Reason for Admission:   CVA                  RRAT Score:  16                Do you (patient/family) have any concerns for transition/discharge? Home vs rehab               Plan for utilizing home health:   ARU vs SNF      Likelihood of readmission?   mod            Transition of Care Plan:    Met with pt and spoke with pt's dgt by phone about [facility] and DCP. PT/OT rec inpt rehab. Dgt states pt has been in the past about 4 yrs. Ago and went home with hired help as pt has a LTC policy that pays for hired help. Dgt is prepared to stay with pt. Dgt and pt's son are also discussing assisted living down the road. Dgt states pt owns a rolling walker, cane, 3:1, and shower chair. Getting Life Alert for pt as well. Family to transport at  D/C. Dgt indicated that pt was driving prior to this admission. CM following for DCP needs. [facility] and will evaluate for appropriateness of admission at d/c.    Care Management Interventions  Mode of Transport at Discharge: Self  Transition of Care Consult (CM Consult): Discharge Planning, Other (ARU?)  MyChart Signup: No  Discharge Durable Medical Equipment: No  Physical Therapy Consult: Yes  Occupational Therapy Consult: Yes  Speech Therapy Consult: No  Current Support Network: Lives Alone  Confirm Follow Up Transport: Family  Plan discussed with Pt/Family/Caregiver: Yes  H[place]ricane Resource Information Provided?: No  Discharge Location  Discharge Placement: Rehab hospital/unit acute

## 2018-08-27 NOTE — PROGRESS NOTES
Problem: Self Care Deficits Care Plan (Adult)  Goal: *Acute Goals and Plan of Care (Insert Text)  Occupational Therapy Goals  Initiated 8/25/2018 within 7 day(s). 1.  Patient will perform grooming with modified independence progressing to standing at the sink as able  2. Patient will perform lower body dressing with modified independence. 3.  Patient will perform toilet transfer with contact guard assist.  4.  Patient will demonstrate 4/5 UB strength in preparation for her participation in her self care routine   Outcome: Progressing Towards Goal  Occupational Therapy TREATMENT    Patient: Ana Hartley (6 y.o. female)  Date: 8/27/2018  Diagnosis: CVA (cerebral vascular accident) [facility])  UTI (urinary tract infection)  Confusion CVA (cerebral vascular accident) ([facility] [address]       Precautions:    Chart, occupational therapy assessment, plan of care, and goals were reviewed. ASSESSMENT:  Nursing cleared pt for therapy, pt agreeable to skilled OT services at this time. Pt is seen with PT to increase safety of the pt and staff during functional mobility and ADLs. Pt was tearful at beginning of session discussing her dog who passed away; often difficult to understand 2/2 soft-spoken volume of voice vs mumbling. Pt required Min/Mod A and extra time to maneuver to EOB in prep for OOB ADLs. Pt reported she's been using the bedpan with nursing; nurse retrieved [facility] for therapy (thank you). Pt required Min/Mod A of 2 for safety for toilet tsf to BSC (placed at bedside) using FWW; vc and tactile cues/input provided for pt to lean forward for sit to stand. Pt appeared anxious throughout transitional movements, requiring extra assistance 2/2 safety concerns. Pt completed toileting with SBA for safety and required Max/Total A for pericare/bowel hygiene as pt was unable to safely lean forward and perform bowel hygiene.  Toilet tsf (simulated) x2 additional trials from [facility] to chair and chair to bed with Min/Mod A of 2 and verbal/tactile cues. Pt was left comfortably in bed as transport was present to take pt to vascular. Progression toward goals:  [x]          Improving appropriately and progressing toward goals  []          Improving slowly and progressing toward goals  []          Not making progress toward goals and plan of care will be adjusted     PLAN:  Patient continues to benefit from skilled intervention to address the above impairments. Continue treatment per established plan of care. Discharge Recommendations:  Rehab  Further Equipment Recommendations for Discharge:  [facility]       G-CODES:     Self Care  Current  CL= 60-79%   Goal  CJ= 20-39%. The severity rating is based on the Level of Assistance required for Functional Mobility and ADLs. SUBJECTIVE:   Patient stated I wish you would've caught me this morning when I had more pep.     OBJECTIVE DATA SUMMARY:   Cognitive/Behavioral Status:  Neurologic State: Alert  Orientation Level: Oriented to time, Oriented to situation, Oriented to person  Cognition: Follows commands  Safety/Judgement: Fall prevention    Functional Mobility and Transfers for ADLs:   Bed Mobility:  Supine to Sit: Minimum assistance; Moderate assistance; Additional time  Scooting: Minimum assistance      Transfers:  Sit to Stand: Moderate assistance;Minimum assistance;Assist x2  Bed to Chair: Minimum assistance   Toilet Transfer : Moderate assistance;Minimum assistance;Assist x2 (for safety)   Bathroom Mobility: Moderate assistance;Minimum assistance        Balance:  Sitting: Intact; With support  Sitting - Static: Good (unsupported)  Sitting - Dynamic: Fair (occasional)  Standing: Impaired; With support  Standing - Static: Fair  Standing - Dynamic : Fair    ADL Intervention:  Grooming  Washing Hands: Supervision/set-up    Toileting  Toileting Assistance: Maximum assistance  Bladder Hygiene: Maximum assistance  Bowel Hygiene:  Total assistance (dependent)  Clothing Management: Contact guard assistance  Adaptive Equipment: Walker    Pain:  Pt reports 0/10 pain or discomfort prior to treatment.    Pt reports 0/10 pain or discomfort post treatment. Activity Tolerance:    Fair     Please refer to the flowsheet for vital signs taken during this treatment.   After treatment:   []  Patient left in no apparent distress sitting up in chair  [x]  Patient left in no apparent distress in bed  [x]  Call bell left within reach  []  Nursing notified  [x]  Transport present for vascular   []  Bed alarm activated    CARROL Murphy   Time Calculation: 30 mins

## 2018-08-27 NOTE — TELEPHONE ENCOUNTER
Daughter returned call. Says mother is in [facility]. Had a small stroke.  Says they are giving her lasix in the hospital.

## 2018-08-27 NOTE — TELEPHONE ENCOUNTER
----- Message from Celeste Menjivar MD sent at 8/24/2018  7:29 AM EDT -----  Please notify the patient that her blood test suggests that she has mild fluid overload in her lungs and I will call in a low-dose of Lasix that should work well with the low-dose diuretic she is already on to get rid of this excess fluid. I will see her back in a month as we planned.

## 2018-08-27 NOTE — ROUTINE PROCESS
Bedside shift change report given to Inessa Meléndez (oncoming nurse) by Kimberly Howell (offgoing nurse). Report included the following information SBAR, Intake/Output, MAR, Recent Results and Cardiac Rhythm AFib.

## 2018-08-27 NOTE — PROGRESS NOTES
MBS completed with recs of NPO with consideration of an alternate means of nutrition/hydration vs comfort feeds. Full report to follow.      Thank you for this referral.    Elmer Parson M.S. CCC-SLP/L  Speech-Language Pathologist

## 2018-08-27 NOTE — PROGRESS NOTES
Problem: Mobility Impaired (Adult and Pediatric)  Goal: *Acute Goals and Plan of Care (Insert Text)  Physical Therapy Goals  Initiated 8/25/2018 and to be accomplished within 7 day(s)  1. Patient will move from supine to sit and sit to supine, scoot up and down and roll side to side in bed with contact guard. 2.  Patient will transfer from bed to chair and chair to bed with contact guard using the least restrictive device. 3.  Patient will perform sit to stand with contact guard. 4.  Patient will ambulate with minimum assist for >25 feet with the least restrictive device. Outcome: Progressing Towards Goal  physical Therapy TREATMENT    Patient: Nara Fitch (57 y.o. female)  Date: 8/27/2018  Diagnosis: CVA (cerebral vascular accident) [facility])  UTI (urinary tract infection)  [facility]       Precautions:     Chart, physical therapy assessment, plan of care and goals were reviewed. OBJECTIVE/ ASSESSMENT:  Nursing cleared pt to participate with PT. Patient found supine in bed willing to work with PT. Patient seen with OT to maximize safety of patient and staff. Pt transferred from supine to EOB with Min/Mod A and additional time this tx. Pt then stood from EOB to RW with ModAx2 and took 4 steps to b/s commode. Pt required manual cues for hip placement upon sitting down onto commode. Pt had a bm while on commode. Pt then stood to RW with Min A and malaika care was performed by GALVIN. Pt then ambulated back to EOB. Pt then stood to RW and ambulated 3ft to recliner chair and demonstrated narrowed FRANKLYN and required Lynette for RW management and hip placement upon sitting. Pt then performed seated therex (see below). Prior to ending therapy, transport team arrived to take pt off unit for testing. Pt then ambulated an additional 3ft back to EOB. Pt returned to supine with Mod A for B LE's. Pt was left comfortably in bed with all needs in reach and transport team present. Education: gait, transfers, activity pacing, therex, bed mobility  Progression toward goals:  [x]      Improving appropriately and progressing toward goals  []      Improving slowly and progressing toward goals  []      Not making progress toward goals and plan of care will be adjusted     PLAN:  Patient continues to benefit from skilled intervention to address the above impairments. Continue treatment per established plan of care. Discharge Recommendations:  Inpatient Rehab  Further Equipment Recommendations for Discharge:  rolling walker     SUBJECTIVE:   Patient stated You should have come this morning when I had more pep    OBJECTIVE DATA SUMMARY:   Functional Mobility Training:  Bed Mobility:  Supine to Sit: Minimum assistance; Moderate assistance; Additional time  Scooting: Minimum assistance  Transfers:  Sit to Stand: Moderate assistance;Minimum assistance;Assist x2  Stand to Sit: Moderate assistance;Minimum assistance;Assist x2  Bed to Chair: Minimum assistance  Balance:  Sitting: Intact; With support  Sitting - Static: Good (unsupported)  Sitting - Dynamic: Fair (occasional)  Standing: Impaired; With support  Standing - Static: Fair  Standing - Dynamic : Fair  Ambulation/Gait Training:  Distance (ft): 10 Feet (ft)  Assistive Device: Walker, rolling  Ambulation - Level of Assistance: Minimal assistance; Moderate assistance; Additional time  Gait Abnormalities: Decreased step clearance  Base of Support: Center of gravity altered;Narrowed  Speed/Linnea: Slow;Shuffled  Step Length: Left shortened;Right shortened  Interventions: Safety awareness training;Manual cues; Tactile cues; Verbal cues; Visual/Demos  Therapeutic Exercises:       EXERCISE   Sets   Reps   Active Active Assist   Passive Self- assited ROM   Comments   Ankle Pumps    [] [] [] []    Quad Sets   [] [] [] []    Glut Sets   [] [] [] []    Short Arc Quads   [] [] [] []    Heel/toe taps 1 10 [x] [] [] []    Straight Leg Raises   [] [] [] []    Hip Abd   [] [] [] []    Long Arc Quads 1 10 [x] [] [] []    Seated Marching 1 10 [x] [] [] []    Seated Knee Flexion   [] [] [] []    Standing Marching   [] [] [] []      Pain:  Pre tx pain: 0/10  Post tx pain: not rated  Activity Tolerance:   fair  Please refer to the flowsheet for vital signs taken during this treatment. After treatment:   [] Patient left in no apparent distress sitting up in chair  [x] Patient left in no apparent distress in bed  [x] Call bell left within reach  [x] Transport team present  [] Caregiver present  [] Bed alarm activated  [] SCDs applied  [] Ice applied      Lauren E D'Amico, PTA   Time Calculation: 30 mins    Mobility  Current  CK= 40-59%. The severity rating is based on the Level of Assistance required for Functional Mobility and ADLs. Mobility   Goal  CI= 1-19%. The severity rating is based on the Level of Assistance required for Functional Mobility and ADLs.

## 2018-08-27 NOTE — PROGRESS NOTES
CMS went to speak with the pt in regards to the [employer] from [employer] Your [employer]. \"  Pt was able to review, but unable to sign the documents provided. No family were present at bedside. Unsigned, but noted documents can be found in the chart for review; additional copies were left with the pt for family review.

## 2018-08-28 PROCEDURE — 74011250636 HC RX REV CODE- 250/636: Performed by: INTERNAL MEDICINE

## 2018-08-28 PROCEDURE — 74011250637 HC RX REV CODE- 250/637: Performed by: INTERNAL MEDICINE

## 2018-08-28 PROCEDURE — 65660000000 HC RM CCU STEPDOWN

## 2018-08-28 PROCEDURE — 97116 GAIT TRAINING THERAPY: CPT

## 2018-08-28 PROCEDURE — 92526 ORAL FUNCTION THERAPY: CPT

## 2018-08-28 PROCEDURE — 97110 THERAPEUTIC EXERCISES: CPT

## 2018-08-28 PROCEDURE — 77030038269 HC DRN EXT URIN PURWCK BARD -A

## 2018-08-28 PROCEDURE — 97530 THERAPEUTIC ACTIVITIES: CPT

## 2018-08-28 PROCEDURE — 97535 SELF CARE MNGMENT TRAINING: CPT

## 2018-08-28 RX ADMIN — HEPARIN SODIUM 5000 UNITS: 5000 INJECTION, SOLUTION INTRAVENOUS; SUBCUTANEOUS at 11:53

## 2018-08-28 RX ADMIN — HEPARIN SODIUM 5000 UNITS: 5000 INJECTION, SOLUTION INTRAVENOUS; SUBCUTANEOUS at 21:53

## 2018-08-28 RX ADMIN — HEPARIN SODIUM 5000 UNITS: 5000 INJECTION, SOLUTION INTRAVENOUS; SUBCUTANEOUS at 05:15

## 2018-08-28 RX ADMIN — DILTIAZEM HYDROCHLORIDE 30 MG: 30 TABLET, FILM COATED ORAL at 17:36

## 2018-08-28 RX ADMIN — FAMOTIDINE 20 MG: 20 TABLET ORAL at 17:36

## 2018-08-28 RX ADMIN — LOVASTATIN 40 MG: 20 TABLET ORAL at 10:00

## 2018-08-28 RX ADMIN — DILTIAZEM HYDROCHLORIDE 30 MG: 30 TABLET, FILM COATED ORAL at 11:55

## 2018-08-28 RX ADMIN — ACETAMINOPHEN 650 MG: 325 TABLET, FILM COATED ORAL at 22:00

## 2018-08-28 RX ADMIN — Medication 10 ML: at 17:37

## 2018-08-28 RX ADMIN — DILTIAZEM HYDROCHLORIDE 30 MG: 30 TABLET, FILM COATED ORAL at 08:25

## 2018-08-28 RX ADMIN — ASPIRIN 325 MG ORAL TABLET 325 MG: 325 PILL ORAL at 10:00

## 2018-08-28 RX ADMIN — Medication 10 ML: at 21:54

## 2018-08-28 RX ADMIN — Medication 10 ML: at 05:17

## 2018-08-28 NOTE — PROGRESS NOTES
ARU/IPR REFERRAL CONTACT NOTE [id number] [place] [address] for Physical Rehabilitation RE: Astrid Arreaga Thank you for the opportunity to review this patient's case for admission to [id number] [place] [address] for Physical Rehabilitation. Based on our pre-admission screening:  
 
[x ] Our Team/Medical Director is following this case. Comments: Patient is presently NPO at this time. Daughter to speak to pt about options of comfort feeds vs PEG tube. We will continue to follow pts medical status, PT/OT/ST progress and decision on whether patient will will do comfort feeds vs PEG tube. Again, Thank you for this referral. Should you have any questions please do not hesitate to call. Sincerely, Anya Tapia Admissions [facility] for Physical Rehabilitation 
[phone]

## 2018-08-28 NOTE — ROUTINE PROCESS
Bedside and Verbal shift change report given StuON   (oncoming nurse) by Roland Pedroza RN  (offgoing nurse). Report included the following information SBAR, MAR and Recent Results.

## 2018-08-28 NOTE — PROGRESS NOTES
Problem: Dysphagia (Adult) Goal: *Acute Goals and Plan of Care (Insert Text) Patient will: 1. Tolerate PO trials with 0 s/s overt distress in 4/5 trials 2. Utilize compensatory swallow strategies/maneuvers (decrease bite/sip, size/rate, alt. liq/sol) with min cues in 4/5 trials 3. Perform oral-motor/laryngeal exercises to increase oropharyngeal swallow function with min cues 4. Complete an objective swallow study (i.e., MBSS) to assess swallow integrity, r/o aspiration, and determine of safest LRD, min A- met 8/27/18 Recommend: NPO with alternate means of nutrition/hydration vs comfort feeds Strict aspiration precautions (HOB >30 degrees at all times, Oral care TID) Outcome: Not Met Speech language pathology dysphagia treatment Patient: Joseph Figueroa (32 y.o. female) Date: 8/28/2018 Diagnosis: CVA (cerebral vascular accident) ([address] UTI (urinary tract infection) Confusion CVA (cerebral vascular accident) ([address] Precautions: Aspiration ASSESSMENT: 
Pt seen for f/u dysphagia management. Pt alert and awake, accepting of tx. Family exiting room and pt with thin liquid (water) at bedside upon SLP entry. Per MBSS report yesterday, 8/27/18, \"the patient presents with mild-mod oral and sev pharyngeal dysphagia c/b aspiration with cough with thin liquids and penetration laryngeal vestibule with all PO presentations. Small bites/sips with effortful swallow ineffective at improving airway protection. Qasim Hall Pt currently at high risk for aspiration, malnutrition, and dehydration with PO. Rec NPO with consideration of an alternate means of nutrition/hydration vs comfort feeds, aspiration precautions, and oral care TID. \" Per d/w RN, Melida Minooka, pt placed on previous diet of puree texture with nectar thick liquid this AM d/t family declining tube feeds and opting for comfort feeds with knowledge of aspiration risk.  
Pt completing the following laryngeal elevation and BOT strengthening exercises with mod-max cues: sustained /i/ +5/5, 4 sec each; hard /k/ words +25/30 with effective production of /k/ for BOT elevation; jarrett +3/5; shaker +5/5, 5 seconds each; effortful swallow +5/5 with thin liquid sips via straw - double swallow and mildly wet vocal quality noted post thin liquid trials. Pt provided with handout of exercises, verbalizing \"I will practice these while laying in bed\". Recommend continue comfort feeds at this time per family preference. Pt and family educated on and verbalized understanding of findings, recs, and POC; d/w RN, [facility]. Maximum therapeutic gains met. Diet initiated per MD. Family educated on aspiration risks with PO and continues to wish for comfort feeds at this time. Will discharge pt from [address] [place] [address] accordingly. Progression toward goals: 
[]         Improving appropriately and progressing toward goals 
[]         Improving slowly and progressing toward goals [x]         Not making progress toward goals and plan of care will be adjusted PLAN: 
Recommendations and Planned Interventions: 
See above. Patient continues to benefit from skilled intervention to address the above impairments. Continue treatment per established plan of care. Discharge Recommendations:  Inpatient Rehab and To Be Determined. SUBJECTIVE:  
Patient stated I can practice these while laying in bed. OBJECTIVE:  
Cognitive and Communication Status: 
Neurologic State: Alert, Appropriate for age Orientation Level: Oriented X4, Appropriate for age Cognition: Appropriate decision making, Follows commands, Appropriate for age attention/concentration Perception: Appears intact Perseveration: No perseveration noted Safety/Judgement: Fall prevention Dysphagia Treatment: 
Oral Assessment: 
Oral Assessment Labial: No impairment Dentition: Intact Oral Hygiene: adequate Lingual: Decreased rate Velum: No impairment Mandible: No impairment P.O.  Trials: 
 Patient Position: [facility] 
 Vocal quality prior to P.O.: Low volume Consistency Presented: Thin liquid How Presented: Self-fed/presented, Straw Bolus Acceptance: No impairment Bolus Formation/Control: No impairment Propulsion: No impairment Oral Residue: None Initiation of Swallow: Delayed (# of seconds) Laryngeal Elevation: Decreased Aspiration Signs/Symptoms: Clear throat, Change vocal quality Pharyngeal Phase Characteristics: Poor endurance, Double swallow Effective Modifications: Small sips and bites Cues for Modifications: Moderate Oral Phase Severity: Moderate Pharyngeal Phase Severity : Severe Exercises: 
Laryngeal Exercises: 
  
Effortful Swallow: Yes Sets : 1 Reps : 5 Brenda: Yes Sets : 1 Reps : 5 Sing \"EEE\": Yes Sets : 1 Reps : 5 Shaker: Yes Sets : 1 Reps : 5 Hard Glottal Attack: Yes Sets : 3 Reps : 10 PAIN: 
Pt reports 0/10 pain or discomfort prior to tx. Pt reports 0/10 pain or discomfort post tx. After treatment:  
[]              Patient left in no apparent distress sitting up in chair 
[x]              Patient left in no apparent distress in bed 
[x]              Call bell left within reach [x]              Nursing notified 
[]              Caregiver present 
[]              Bed alarm activated COMMUNICATION/EDUCATION:  
[x]              SLP educated pt with regard to compensatory swallow strategies and 
      aspiration/reflux precautions including: small bites/sips, 
      alternate liquids/solids, decrease feeding rate, HOB > 45 with all po, and 
                 upright in bed at 30 degrees after po for at least 45 
      minutes. Thank you for this referral. 
 
Kat Griffin M.S., CCC-SLP Speech-Language Pathologist

## 2018-08-28 NOTE — PROGRESS NOTES
Problem: Mobility Impaired (Adult and Pediatric) Goal: *Acute Goals and Plan of Care (Insert Text) Physical Therapy Goals Initiated 8/25/2018 and to be accomplished within 7 day(s) 1. Patient will move from supine to sit and sit to supine, scoot up and down and roll side to side in bed with contact guard. 2.  Patient will transfer from bed to chair and chair to bed with contact guard using the least restrictive device. 3.  Patient will perform sit to stand with contact guard. 4.  Patient will ambulate with minimum assist for >25 feet with the least restrictive device. Outcome: Progressing Towards Goal 
physical Therapy TREATMENT Patient: Lor Hui (85 y.o. female) Date: 8/28/2018 Diagnosis: CVA (cerebral vascular accident) ([facility] UTI (urinary tract infection) Confusion CVA (cerebral vascular accident) ([facility] Precautions:    
Chart, physical therapy assessment, plan of care and goals were reviewed. OBJECTIVE/ ASSESSMENT: 
Patient found supine HOB elvated willing to work with PT. Tx performed w/ OT to maximize safety and effectiveness. Pt performed sup to sit and rolling min A and mod A w/ scooting to EOB. Pt sat at EOB for approx 10' displaying good static and fair dynamic balance w/ ADLs req cueing to maintain midline during tx. Pt performed sit to stand min A and amb 4' min A req cueing to advance (B) LE through both phases of gait, however displayed improvement of motor coordination during amb. Pt req cueing for walker navigation to align to sit in recliner and cueing for sequencing CGA displaying fair + ecc control. In chair, pt performed X10 LAQ (B) right LE > LLE. Pt left in room w/ all needs in reach and nurse notified of pt status. Pt presented more fatigued today vs. Previous visits resulting in decreased functional mobility, however is very motivated and anxious to improve mobility. Pt would make a great rehab candidate. Education:safety, importance of there-ex in seated position, importance of cont mobility Progression toward goals: 
[]      Improving appropriately and progressing toward goals 
[]      Improving slowly and progressing toward goals 
[]      Not making progress toward goals and plan of care will be adjusted PLAN: 
Patient continues to benefit from skilled intervention to address the above impairments. Continue treatment per established plan of care. Discharge Recommendations:  Inpatient Rehab Further Equipment Recommendations for Discharge:  rolling walker SUBJECTIVE:  
Patient stated I can't get any sleep because it's so loud at night in the [place].  (suggest pt receive ear plugs from family if appropriate) OBJECTIVE DATA SUMMARY:  
Critical Behavior: 
Neurologic State: Alert Orientation Level: Oriented X4 Cognition: Follows commands Safety/Judgement: Fall prevention Functional Mobility Training: 
Bed Mobility: 
Rolling: Minimum assistance Supine to Sit: Minimum assistance; Additional time Scooting: Minimum assistance Transfers: 
Sit to Stand: Minimum assistance;Assist x2; Additional time Stand to Sit: Contact guard assistance Stand Pivot Transfers: Additional time Bed to Chair: Assist x2 Balance: 
Sitting: Impaired Sitting - Static: Good (unsupported) Sitting - Dynamic: Fair (occasional) Standing: Impaired; With support Standing - Static: Fair Standing - Dynamic : Fair Ambulation/Gait Training: 
Distance (ft): 4 Feet (ft) Assistive Device: Walker, rolling Ambulation - Level of Assistance: Minimal assistance Base of Support: Center of gravity altered;Narrowed Speed/Linnea: Slow;Shuffled Step Length: Left shortened;Right shortened Therapeutic Exercises:  
 
 
EXERCISE Sets Reps Active Active Assist  
Passive Self- assited ROM Comments Ankle Pumps    [] [] [] [] Quad Sets   [] [] [] [] Glut Sets   [] [] [] [] Short Arc Quads   [] [] [] [] Heel Slides   [] [] [] [] Straight Leg Raises   [] [] [] [] Hip Abd   [] [] [] [] Long Arc Quads 1 10 [x] [] [] [] Seated Marching   [] [] [] [] Seated Knee Flexion   [] [] [] []   
Standing Marching   [] [] [] []   
 
Pain: 0/10 Activity Tolerance:  
Fatigues easily as weak, however recovers well Please refer to the flowsheet for vital signs taken during this treatment. After treatment:  
[x] Patient left in no apparent distress sitting up in chair 
[] Patient left in no apparent distress in bed 
[x] Call bell left within reach [x] Nursing notified 
[x] Caregiver present [x] Chair alarm activated 
[] SCDs applied 
[] Ice applied Juany Malhotra PTA Time Calculation: 26 mins Mobility [id number] Current  CK= 40-59%. The severity rating is based on the Level of Assistance required for Functional Mobility and ADLs. Mobility   Goal  CI= 1-19%. The severity rating is based on the Level of Assistance required for Functional Mobility and ADLs.

## 2018-08-28 NOTE — PROGRESS NOTES
Bedside shift change report given to Leobardo Tucker (oncoming nurse) by Corrinne Peabody RN (offgoing nurse). Report included the following information SBAR.

## 2018-08-28 NOTE — PROGRESS NOTES
Problem: Self Care Deficits Care Plan (Adult) Goal: *Acute Goals and Plan of Care (Insert Text) Occupational Therapy Goals Initiated 8/25/2018 within 7 day(s). 1.  Patient will perform grooming with modified independence progressing to standing at the sink as able 2. Patient will perform lower body dressing with modified independence. 3.  Patient will perform toilet transfer with contact guard assist. 
4.  Patient will demonstrate 4/5 UB strength in preparation for her participation in her self care routine Outcome: Progressing Towards Goal 
Occupational Therapy TREATMENT Patient: Marlen Bose (75 y.o. female) Date: 8/28/2018 Diagnosis: CVA (cerebral vascular accident) ([facility] [address] UTI (urinary tract infection) Confusion CVA (cerebral vascular accident) ([facility] [facility] Precautions:   
Chart, occupational therapy assessment, plan of care, and goals were reviewed. ASSESSMENT: 
Pt is agreeable to participate in therapy in spite of reporting of feeling fatigued this afternoon. Pt maneuvered to EOB w/Min Ax2, following which required additional time to achieve midline and scoot to EOB for optimal positioning for ADLs. Pt participated in multiple ADL grooming tasks seated EOB requiring SBA for safety, and VCs for pacing 2/2 decreased coordination in BUE. Pt then was educated on BUE scapular strengthening/setting TherEx, pt was able to perform x10 w/manual and visual cues. Pt maneuvered to the chair w/FWW (simulating txfr to the toilet w/Min Ax2 and VCs for sequencing. Pt was educated on compensatory technique training for LB ADLs, following which pt required Mod A to doff/don socks w/VCs for sequencing. Pt left comfortable seated in the chair, call bell within reach. Chair alarm is activated for pt's safety. Progression toward goals: 
[x]          Improving appropriately and progressing toward goals 
[]          Improving slowly and progressing toward goals []          Not making progress toward goals and plan of care will be adjusted PLAN: 
Patient continues to benefit from skilled intervention to address the above impairments. Continue treatment per established plan of care. Discharge Recommendations:  Rehab Further Equipment Recommendations for Discharge:  [facility]  
  
G-CODES:  
 
Self Care  Current  CK= 40-59%  Goal  CJ= 20-39%. The severity rating is based on the Level of Assistance required for Functional Mobility and ADLs. SUBJECTIVE:  
Patient stated I don't know why am I so tired?  OBJECTIVE DATA SUMMARY:  
Cognitive/Behavioral Status: 
Neurologic State: Alert Orientation Level: Oriented X4 Cognition: Follows commands Safety/Judgement: Fall prevention Functional Mobility and Transfers for ADLs: 
 Bed Mobility: 
  
Supine to Sit: Minimum assistance; Additional time;Assist x2 Scooting: Minimum assistance Transfers: 
Sit to Stand: Minimum assistance;Assist x2 Bed to Chair: Minimum assistance;Assist x2 Toilet Transfer : Minimum assistance;Assist x2 Balance: 
Sitting: Impaired Sitting - Static: Good (unsupported) Sitting - Dynamic: Fair (occasional) Standing: Impaired; With support Standing - Static: Fair Standing - Dynamic : Fair ADL Intervention: 
 Grooming Grooming Assistance: Stand-by assistance Washing Face: Stand-by assistance Washing Hands: Stand-by assistance Brushing/Combing Hair: Stand-by assistance Upper Body Dressing Assistance Shirt simulation with hospital gown: Stand-by assistance Lower Body Dressing Assistance Socks: Moderate assistance Therapeutic Exercises:  
See above Pain: 
Pt reports 0/10 pain or discomfort prior to treatment.   
Pt reports 0/10 pain or discomfort post treatment. Activity Tolerance:   
Fair+ Please refer to the flowsheet for vital signs taken during this treatment. After treatment:  
[x]  Patient left in no apparent distress sitting up in chair []  Patient left in no apparent distress in bed 
[x]  Call bell left within reach [x]  Nursing notified 
[]  Caregiver present [x]  Chair alarm activated KAMAR Bolton/L Time Calculation: 26 mins

## 2018-08-29 LAB
ANION GAP SERPL CALC-SCNC: 8 MMOL/L (ref 3–18)
BUN SERPL-MCNC: 28 MG/DL (ref 7–18)
BUN/CREAT SERPL: 32 (ref 12–20)
CALCIUM SERPL-MCNC: 8.1 MG/DL (ref 8.5–10.1)
CHLORIDE SERPL-SCNC: 108 MMOL/L (ref 100–108)
CO2 SERPL-SCNC: 28 MMOL/L (ref 21–32)
CREAT SERPL-MCNC: 0.87 MG/DL (ref 0.6–1.3)
GLUCOSE SERPL-MCNC: 106 MG/DL (ref 74–99)
POTASSIUM SERPL-SCNC: 4.2 MMOL/L (ref 3.5–5.5)
SODIUM SERPL-SCNC: 144 MMOL/L (ref 136–145)

## 2018-08-29 PROCEDURE — 74011250636 HC RX REV CODE- 250/636: Performed by: INTERNAL MEDICINE

## 2018-08-29 PROCEDURE — 77030038269 HC DRN EXT URIN PURWCK BARD -A

## 2018-08-29 PROCEDURE — 74011250637 HC RX REV CODE- 250/637: Performed by: INTERNAL MEDICINE

## 2018-08-29 PROCEDURE — 87086 URINE CULTURE/COLONY COUNT: CPT | Performed by: INTERNAL MEDICINE

## 2018-08-29 PROCEDURE — 36415 COLL VENOUS BLD VENIPUNCTURE: CPT | Performed by: INTERNAL MEDICINE

## 2018-08-29 PROCEDURE — 97535 SELF CARE MNGMENT TRAINING: CPT

## 2018-08-29 PROCEDURE — 77030037875 HC DRSG MEPILEX <16IN BORD MOLN -A

## 2018-08-29 PROCEDURE — 97116 GAIT TRAINING THERAPY: CPT

## 2018-08-29 PROCEDURE — 80048 BASIC METABOLIC PNL TOTAL CA: CPT | Performed by: INTERNAL MEDICINE

## 2018-08-29 PROCEDURE — 65660000000 HC RM CCU STEPDOWN

## 2018-08-29 RX ADMIN — APIXABAN 2.5 MG: 2.5 TABLET, FILM COATED ORAL at 17:50

## 2018-08-29 RX ADMIN — ACETAMINOPHEN 650 MG: 325 TABLET, FILM COATED ORAL at 23:43

## 2018-08-29 RX ADMIN — HEPARIN SODIUM 5000 UNITS: 5000 INJECTION, SOLUTION INTRAVENOUS; SUBCUTANEOUS at 05:21

## 2018-08-29 RX ADMIN — DILTIAZEM HYDROCHLORIDE 30 MG: 30 TABLET, FILM COATED ORAL at 09:56

## 2018-08-29 RX ADMIN — Medication 10 ML: at 07:21

## 2018-08-29 RX ADMIN — FAMOTIDINE 20 MG: 20 TABLET ORAL at 17:51

## 2018-08-29 RX ADMIN — APIXABAN 2.5 MG: 2.5 TABLET, FILM COATED ORAL at 09:56

## 2018-08-29 RX ADMIN — DILTIAZEM HYDROCHLORIDE 30 MG: 30 TABLET, FILM COATED ORAL at 17:51

## 2018-08-29 RX ADMIN — Medication 10 ML: at 15:08

## 2018-08-29 RX ADMIN — DILTIAZEM HYDROCHLORIDE 30 MG: 30 TABLET, FILM COATED ORAL at 13:44

## 2018-08-29 RX ADMIN — HEPARIN SODIUM 5000 UNITS: 5000 INJECTION, SOLUTION INTRAVENOUS; SUBCUTANEOUS at 23:43

## 2018-08-29 RX ADMIN — LOVASTATIN 40 MG: 20 TABLET ORAL at 09:56

## 2018-08-29 RX ADMIN — ASPIRIN 325 MG ORAL TABLET 325 MG: 325 PILL ORAL at 09:56

## 2018-08-29 RX ADMIN — HEPARIN SODIUM 5000 UNITS: 5000 INJECTION, SOLUTION INTRAVENOUS; SUBCUTANEOUS at 13:44

## 2018-08-29 RX ADMIN — Medication 10 ML: at 23:43

## 2018-08-29 NOTE — PROGRESS NOTES
Problem: Urinary Tract Infection - Adult Goal: *Absence of infection signs and symptoms Outcome: Progressing Towards Goal 
No s/sxs of UTI Problem: Falls - Risk of 
Goal: *Absence of Falls Document Abeba Vazquez Fall Risk and appropriate interventions in the flowsheet. Outcome: Progressing Towards Goal 
Fall Risk Interventions: 
Mobility Interventions: Assess mobility with egress test 
 
Mentation Interventions: Update white board, Reorient patient, More frequent rounding, Increase mobility Medication Interventions: Evaluate medications/consider consulting pharmacy, Patient to call before getting OOB, Teach patient to arise slowly Elimination Interventions: Call light in reach, Toileting schedule/hourly rounds History of Falls Interventions: Bed/chair exit alarm, Investigate reason for fall, Room close to nurse's station Problem: Pain Goal: *Control of Pain Outcome: Progressing Towards Goal 
No complaints of pain

## 2018-08-29 NOTE — PROGRESS NOTES
ARU/IPR REFERRAL CONTACT NOTE [id number] [place] [address] for Physical Rehabilitation RE: Tony Daley Thank you for the opportunity to review this patient's case for admission to [address] [place] [address] for Physical Rehabilitation. Based on our pre-admission screening:  
 
[x ] This patient does not meet criteria for admission to [facility] for Physical Rehabilitation due to: 
Pts choice of eating over a PEG we feel that she has a high probability of aspiration and becoming sicker. We feel she would be better suited at a lower level of care such as a SNF. Again, Thank you for this referral. Should you have any questions please do not hesitate to call. Sincerely, Anya García [facility] 
[phone]

## 2018-08-29 NOTE — PROGRESS NOTES
[facility] Hospitalist Group Progress Note Patient: Mireille Schwartz Age: 80 y.o. : 1928 MR#: [id number] SSN: [id number] Date/Time: 2018 Subjective:  
 
Pt states she feels Assessment/Plan:  
-Acute stroke L MCA territory, neuro input noted recommendations made for asa 325 mg for now and to start oac in near future. Risk factors include history of afib, pt reports that she has had vaginal bleeding on oac in the past many years after hysterectomy,has not been anticoagulated. W/u thus far includes MRA breezy,   2d echo no shunt present, no reports about embolic source, carotid pvl negative for significant stenosis. -Afib w/ RVR- presently rate controlled with po cardizem. Pt has h/o afib and is not anticoagulated due to h/o vaginal bleeding. Has never had w/u of vaginal bleeding at the time. 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 -Vasogenic edema left caudate and basal ganglia mentioned on head CT done earlier but this finding is not mentioned on MRI brain done subsequently 
-Mild to moderate OP dysphagia , s/p MBS , recommendations made for NPO status and alternate means of nourishment vs comfort feeds. Discussed at length w/ daughter and pt, pt has decided against PEG and wants to eat. -UTI on rocephin 3 day course finished . Per daughter pt  has h/o recurrent UTI's. Has grown MRSA in the past in urine cx as well as Klebsiella sensitive to rocephin. Will send urine for cx and repeat ua, to eval for MRSA UTI. 
 
-H/o CAD -no active issues -GERD 
-Dyslipidemia on statin PLAN: 
-changed NPO status to po diet per pt wishes, pt understands the risk of aspiration of food and associated consequences, still wants to eat 
-Start Eliquis 
 -cont cardizem 30 mg tid for rate control 
-cont PT/OT 
- cont statin Dispo: pt recommends inpt rehab. Discussed w/ CM . CM states inpt rehab liaison aware Additional Notes:   
 
Case discussed with:  [x]Patient  [x]Family  [x]Nursing  []Case Management DVT Prophylaxis:  []Lovenox  []Hep SQ  [x]SCDs  []Coumadin   []On Heparin gtt Objective:  
VS:  
Visit Vitals  /66 (BP 1 Location: Right arm, BP Patient Position: At rest)  Pulse 97  Temp 98.4 °F (36.9 °C)  Resp 18  Ht 5' (1.524 m)  Wt 55.1 kg (121 lb 6.4 oz)  SpO2 97%  BMI 23.71 kg/m2 Tmax/24hrs: Temp (24hrs), Av.9 °F (36.6 °C), Min:96.9 °F (36.1 °C), Max:98.4 °F (36.9 °C) Input/Output:  
 
Intake/Output Summary (Last 24 hours) at 18 2225 Last data filed at 18 1905 Gross per 24 hour Intake              477 ml Output             2400 ml Net            -1923 ml General: alert, awake, in nad Cardiovascular: rrr, no murmurs Pulmonary:ctab GI: soft, nt, nd  
Extremities:  No edema Additional:  Neuro grossly normal 
Labs:   
No results found for this or any previous visit (from the past 24 hour(s)).  
Additional Data Reviewed:   
 
Signed By: Merle Leavitt MD   
 2018 9:32 PM

## 2018-08-29 NOTE — PROGRESS NOTES
Problem: Self Care Deficits Care Plan (Adult) Goal: *Acute Goals and Plan of Care (Insert Text) Occupational Therapy Goals Initiated 8/25/2018 within 7 day(s). 1.  Patient will perform grooming with modified independence progressing to standing at the sink as able 2. Patient will perform lower body dressing with modified independence. 3.  Patient will perform toilet transfer with contact guard assist. 
4.  Patient will demonstrate 4/5 UB strength in preparation for her participation in her self care routine Outcome: Progressing Towards Goal 
Occupational Therapy TREATMENT Patient: Marlen Bose (41 y.o. female) Date: 8/29/2018 Diagnosis: CVA (cerebral vascular accident) ([facility] [address] UTI (urinary tract infection) Confusion CVA (cerebral vascular accident) ([facility] [address] Precautions:   
Chart, occupational therapy assessment, plan of care, and goals were reviewed. ASSESSMENT: 
Pt sitting up in chair, agreeable to skilled OT services at this time. Pt reporting she is wet from an incontinent episode. Items gathered to assist pt in clean up. Nursing notified and assisted with cleaning chair (thank you, Lexi Nichols). Sit to  prep for OOB ADLs with Min A, stand pivot tsf from chair to [facility] (placed next to chair) with Min A. Pt completed toileting and pericare hygiene with SBA. Pt stood from [facility] with CGA, completed bowel hygiene in standing. SPT BSC back to chair with CGA. Seated in chair, pt completed UBE (see below) with education/cues on proper form/completion of exercise. Pt was tearful throughout session discussing visitors who \"tell me how to eat\", pt comforted with encouragement to stay positive provided. Pt left seated in chair with all needs within reach. Progression toward goals: 
[x]          Improving appropriately and progressing toward goals 
[]          Improving slowly and progressing toward goals 
[]          Not making progress toward goals and plan of care will be adjusted PLAN: 
 Patient continues to benefit from skilled intervention to address the above impairments. Continue treatment per established plan of care. Discharge Recommendations:  Rehab Further Equipment Recommendations for Discharge:  bedside commode G-CODES:  
 
Self Care  Current  CK= 40-59%  Goal  CJ= 20-39%. The severity rating is based on the Level of Assistance required for Functional Mobility and ADLs. SUBJECTIVE:  
Patient stated I'm all wet, what did you want to do?  OBJECTIVE DATA SUMMARY:  
Cognitive/Behavioral Status: 
Neurologic State: Alert Orientation Level: Oriented X4 Cognition: Decreased command following, Appropriate for age attention/concentration Safety/Judgement: Fall prevention Functional Mobility and Transfers for ADLs: 
 Transfers: 
Sit to Stand: Minimum assistance;Contact guard assistance Toilet Transfer : Minimum assistance Balance: 
Sitting: Intact Sitting - Static: Good (unsupported) Standing: Impaired; With support Standing - Static: Fair Standing - Dynamic : Fair ADL Intervention: 
Feeding Feeding Assistance: Independent Upper Body Dressing Assistance Dressing Assistance: Supervision/set-up Hospital Gown: Supervision/ set-up (doff/pradip) Toileting Toileting Assistance: Stand-by assistance Bladder Hygiene: Stand-by assistance Bowel Hygiene: Stand-by assistance Clothing Management: Stand-by assistance Cues: Visual cues provided Therapeutic Exercises:  
Pt educated on UE therex with visual/verbal cues/demo; pt completed x10 reps BUE scapula elevation/depression, shoulder flexion/extension, bicep curls. Cues for proper form/completion of each rep. Pain: 
Pt reports 0/10 pain or discomfort prior to treatment.   
Pt reports 0/10 pain or discomfort post treatment. Activity Tolerance:   
Fair Please refer to the flowsheet for vital signs taken during this treatment. After treatment: [x]  Patient left in no apparent distress sitting up in chair 
[]  Patient left in no apparent distress in bed 
[x]  Call bell left within reach [x]  Nursing notified 
[]  Caregiver present 
[]  Bed alarm activated CARROL Flynn Time Calculation: 25 mins

## 2018-08-29 NOTE — PROGRESS NOTES
08/29/18 0034 Straight Cath Straight Cath Nurse performed cath;Clean technique used;Sterile technique used Number of Attempts 3 Catheter Size (15 Micronesian) Time Catheter Inserted 0034 Time Catheter Removed 8021 Urine 475 mL Straight cath performed to obtain urine sample

## 2018-08-29 NOTE — PROGRESS NOTES
Problem: Falls - Risk of 
Goal: *Absence of Falls Document Easter Getting Fall Risk and appropriate interventions in the flowsheet. Outcome: Progressing Towards Goal 
Fall Risk Interventions: 
Mobility Interventions: Assess mobility with egress test 
 
Mentation Interventions: Update white board, Reorient patient, More frequent rounding, Increase mobility Medication Interventions: Evaluate medications/consider consulting pharmacy, Patient to call before getting OOB, Teach patient to arise slowly Elimination Interventions: Call light in reach, Toileting schedule/hourly rounds History of Falls Interventions: Bed/chair exit alarm, Investigate reason for fall, Room close to nurse's station Problem: Pressure Injury - Risk of 
Goal: *Prevention of pressure injury Document Noah Scale and appropriate interventions in the flowsheet. Outcome: Progressing Towards Goal 
Pressure Injury Interventions: 
Sensory Interventions: Assess changes in LOC, Avoid rigorous massage over bony prominences, Minimize linen layers Moisture Interventions: Limit adult briefs, Absorbent underpads, Internal/External urinary devices Activity Interventions: Increase time out of bed, PT/OT evaluation Mobility Interventions: HOB 30 degrees or less, PT/OT evaluation Nutrition Interventions: Document food/fluid/supplement intake Friction and Shear Interventions: HOB 30 degrees or less, Apply protective barrier, creams and emollients

## 2018-08-29 NOTE — PROGRESS NOTES
Bedside shift change report given to Leobardo Tucker (oncoming nurse) by Dalton Lam RN (offgoing nurse). Report included the following information SBAR, Intake/Output and Recent Results.

## 2018-08-29 NOTE — PROGRESS NOTES
[facility] Hospitalist Group Progress Note Patient: Queta Capone Age: 80 y.o. : 1928 MR#: [id number] SSN: [id number] Date/Time: 2018 Subjective:  
 
Review of systems No new complaints No NVD No SOB No Cough Assessment/Plan: 1. Acute CVA  
- MRA - unremarkable - MRI - acute left MCA territory ischemic change involving 
basal ganglia/caudate, and portions of the periinsular and opercular cortices. - Continue ASA - GI prophylaxis - pepcid  
- Continue statin - mevacor 2 Oropharyngeal dysphagia - S/P MBS - IMPRESSION:  
1. Aspiration with thin consistency and penetration with other consistencies. - patient in spite of risk of aspiration wants po diet 3 Chronic afib with RVR  
 
 
4 asymptomatic - UTI - POA  
- one dose of Ceftriaxone given - currently not complaining and issues - Now rate controlled with Cardizem - On Eliquis - LTAC  
 
5 Placement - DC her family - they gave their preference for rehab  
- likely DC in AM  
 
 
Case discussed with:  [x]Patient  []Family  []Nursing  []Case Management DVT Prophylaxis:  []Lovenox  []Hep SQ  []SCDs  []Coumadin   []On Heparin gtt Objective:  
VS:  
Visit Vitals  /71 (BP 1 Location: Right arm, BP Patient Position: Sitting)  Pulse 83  Temp 98.1 °F (36.7 °C)  Resp 16  
 Ht 5' (1.524 m)  Wt 55.1 kg (121 lb 6.4 oz)  SpO2 94%  BMI 23.71 kg/m2 Tmax/24hrs: Temp (24hrs), Av.1 °F (36.7 °C), Min:97.9 °F (36.6 °C), Max:98.4 °F (36.9 °C) IOBRIEF Intake/Output Summary (Last 24 hours) at 18 1630 Last data filed at 18 1341 Gross per 24 hour Intake              357 ml Output             1175 ml Net             -818 ml General:  Alert, cooperative, no acute distress // anxious Cardiovascular: S1S2 - regular , No Murmur Pulmonary: Equal expansion , No Use of accessory muscles , No Rales No Rhonchi GI:  +BS in all four quadrants, soft, non-tender Extremities:  No edema; 2+ dorsalis pedis pulses bilaterally Neuro: Alert and oriented X 2. Medications:  
Current Facility-Administered Medications Medication Dose Route Frequency  apixaban (ELIQUIS) tablet 2.5 mg  2.5 mg Oral BID  sodium chloride (NS) flush 5-10 mL  5-10 mL IntraVENous Q8H  
 heparin (porcine) injection 5,000 Units  5,000 Units SubCUTAneous TID  acetaminophen (TYLENOL) tablet 650 mg  650 mg Oral Q8H PRN  
 albuterol-ipratropium (DUO-NEB) 2.5 MG-0.5 MG/3 ML  3 mL Nebulization Q4H PRN  
 dilTIAZem (CARDIZEM) IR tablet 30 mg  30 mg Oral TIDAC  lovastatin (MEVACOR) tablet 40 mg  40 mg Oral DAILY  sodium chloride (NS) flush 5-10 mL  5-10 mL IntraVENous PRN  
 aspirin (ASPIRIN) tablet 325 mg  325 mg Oral DAILY  labetalol (NORMODYNE;TRANDATE) injection 5 mg  5 mg IntraVENous Q10MIN PRN  
 bisacodyl (DULCOLAX) suppository 10 mg  10 mg Rectal DAILY PRN  
 famotidine (PEPCID) tablet 20 mg  20 mg Oral QPM  
 
 
Labs:   
Recent Labs  
   08/27/18 
 0130 WBC  9.4 HGB  11.9*  
HCT  35.7 PLT  138 Recent Labs  
   08/29/18 
 0252 NA  144  
K  4.2 CL  108 CO2  28 GLU  106* BUN  28* CREA  0.87 CA  8.1* Signed By: Donna Ryan MD   
 August 29, 2018

## 2018-08-29 NOTE — PROGRESS NOTES
Bedside shift change report given to BRYANT Sawyer (oncoming nurse) by Jennifer Shaw RN (offgoing nurse). Report included the following information SBAR, Recent Results and Med Rec Status.

## 2018-08-29 NOTE — PROGRESS NOTES
CMS went to speak with the pt in regards to the [employer] from [employer] About Your Rights. \"  Pt was able to review, but unable to sign the documents provided. No family were present at bedside. Unsigned, but noted documents can be found in the chart for review.

## 2018-08-29 NOTE — PROGRESS NOTES
Problem: Mobility Impaired (Adult and Pediatric) Goal: *Acute Goals and Plan of Care (Insert Text) Physical Therapy Goals Initiated 8/25/2018 and to be accomplished within 7 day(s) 1. Patient will move from supine to sit and sit to supine, scoot up and down and roll side to side in bed with contact guard. 2.  Patient will transfer from bed to chair and chair to bed with contact guard using the least restrictive device. 3.  Patient will perform sit to stand with contact guard. 4.  Patient will ambulate with minimum assist for >25 feet with the least restrictive device. Outcome: Progressing Towards Goal 
physical Therapy TREATMENT Patient: Matilda Mario (41 y.o. female) Date: 8/29/2018 Diagnosis: CVA (cerebral vascular accident) ([address] UTI (urinary tract infection) Confusion CVA (cerebral vascular accident) ([address] Precautions:    
Chart, physical therapy assessment, plan of care and goals were reviewed. OBJECTIVE/ ASSESSMENT: 
Patient found supine HOB elevated willing to work with PT. Pt indicated she was able to sleep fully t/o the night, improving fatigue levels however remains slightly tired. Pt also indicated slight skin irritation to left lower buttocks, nurse notified and treated w/ protection. Pt improved sup to sit req CGA to EOB and min A w/ scooting forward to obtain proper alignment. From EOB, pt demonstrated good static sitting balance supervision and performed sit <> stand from mult surfaces (chair and bed), which improved from min A to CGA w/ cueing and repetition (X5). Pt amb w/ RW 5ft displaying improvement of swing phase and sequencing of gait w/ improved height/length of (B) LE, thus improving overall quality of gait. Pt rested in chair as fatigue levels increased w/ cont activity, thus limiting further gait distance to ensure safety of pt.  Pt trained in gait quality at chair performing standing marches (B) and toe taps (B) req cueing for height and ecc control all to further improve step clearance. Pt displayed good carry over at end of tx displaying improved ecc control w/ toe taps on right. Pt returned to chair CGA and cueing for sequencing and left in chair on chair alarm w/ all needs in reach. Nurse notified of pt status. Education:safety, importance of cont mobility, importance of quality of gait training for overall improvement Progression toward goals: 
[]      Improving appropriately and progressing toward goals [x]      Improving slowly and progressing toward goals 
[]      Not making progress toward goals and plan of care will be adjusted PLAN: 
Patient continues to benefit from skilled intervention to address the above impairments. Continue treatment per established plan of care. Discharge Recommendations:  Inpatient Rehab Further Equipment Recommendations for Discharge:  rolling walker SUBJECTIVE:  
Patient stated I got a full 7hrs of sleep last night, if I could do that every night I think I would be a lot better.  OBJECTIVE DATA SUMMARY:  
Critical Behavior: 
Neurologic State: Alert, Appropriate for age Orientation Level: Oriented X4, Appropriate for age Cognition: Follows commands Safety/Judgement: Fall prevention Functional Mobility Training: 
Bed Mobility: 
Rolling: Stand-by assistance Supine to Sit: Contact guard assistance Transfers: 
Sit to Stand: Minimum assistance;Contact guard assistance Stand to Sit: Contact guard assistance Balance: 
Sitting: Intact Sitting - Static: Good (unsupported) Standing: Impaired; With support Standing - Static: Fair Standing - Dynamic : Fair Ambulation/Gait Training: 
Distance (ft): 5 Feet (ft) Assistive Device: Walker, rolling Ambulation - Level of Assistance: Contact guard assistance;Minimal assistance Gait Abnormalities: Decreased step clearance Base of Support: Center of gravity altered;Narrowed Speed/Linnea: Slow Step Length: Right shortened;Left shortened Swing Pattern: Right asymmetrical 
 
Pain: 0/10 Pre tx pain:  
Post tx pain:  
Pain Scale 1: Numeric (0 - 10) Pain Intensity 1: 0 Activity Tolerance:  
Becomes fatigued w/ activity req to sit and rest, however recovers well and is well motivated Please refer to the flowsheet for vital signs taken during this treatment. After treatment:  
[x] Patient left in no apparent distress sitting up in chair 
[] Patient left in no apparent distress in bed 
[x] Call bell left within reach [x] Nursing notified 
[] Caregiver present 
[] Bed alarm activated 
[] SCDs applied 
[] Ice applied Jair Vale PTA Time Calculation: 30 mins Mobility [id number] Current  CJ= 20-39%. The severity rating is based on the Level of Assistance required for Functional Mobility and ADLs. Mobility   Goal  CI= 1-19%. The severity rating is based on the Level of Assistance required for Functional Mobility and ADLs.

## 2018-08-29 NOTE — ROUTINE PROCESS
Bedside and Verbal shift change report given to FILIPPO  (oncoming nurse) by Oralia Medina RN 
(offgoing nurse). Report included the following information SBAR, MAR and Recent Results.

## 2018-08-30 PROCEDURE — 65660000000 HC RM CCU STEPDOWN

## 2018-08-30 PROCEDURE — 97530 THERAPEUTIC ACTIVITIES: CPT

## 2018-08-30 PROCEDURE — 74011250636 HC RX REV CODE- 250/636: Performed by: INTERNAL MEDICINE

## 2018-08-30 PROCEDURE — 74011250637 HC RX REV CODE- 250/637: Performed by: INTERNAL MEDICINE

## 2018-08-30 PROCEDURE — 97116 GAIT TRAINING THERAPY: CPT

## 2018-08-30 RX ADMIN — ASPIRIN 325 MG ORAL TABLET 325 MG: 325 PILL ORAL at 11:56

## 2018-08-30 RX ADMIN — DILTIAZEM HYDROCHLORIDE 30 MG: 30 TABLET, FILM COATED ORAL at 12:08

## 2018-08-30 RX ADMIN — FAMOTIDINE 20 MG: 20 TABLET ORAL at 17:21

## 2018-08-30 RX ADMIN — LOVASTATIN 40 MG: 20 TABLET ORAL at 11:56

## 2018-08-30 RX ADMIN — HEPARIN SODIUM 5000 UNITS: 5000 INJECTION, SOLUTION INTRAVENOUS; SUBCUTANEOUS at 04:31

## 2018-08-30 RX ADMIN — Medication 10 ML: at 17:22

## 2018-08-30 RX ADMIN — APIXABAN 2.5 MG: 2.5 TABLET, FILM COATED ORAL at 17:21

## 2018-08-30 RX ADMIN — Medication 10 ML: at 19:58

## 2018-08-30 RX ADMIN — APIXABAN 2.5 MG: 2.5 TABLET, FILM COATED ORAL at 11:56

## 2018-08-30 RX ADMIN — ACETAMINOPHEN 650 MG: 325 TABLET, FILM COATED ORAL at 19:54

## 2018-08-30 RX ADMIN — DILTIAZEM HYDROCHLORIDE 30 MG: 30 TABLET, FILM COATED ORAL at 17:21

## 2018-08-30 RX ADMIN — Medication 10 ML: at 06:52

## 2018-08-30 RX ADMIN — HEPARIN SODIUM 5000 UNITS: 5000 INJECTION, SOLUTION INTRAVENOUS; SUBCUTANEOUS at 12:07

## 2018-08-30 NOTE — ROUTINE PROCESS
1615 Bedside and Verbal shift change report given to ZEENAT Leon RN  by Anand Blackwell RN . Report included the following information SBAR, Kardex, MAR and Recent Results. no

## 2018-08-30 NOTE — NURSE NAVIGATOR
The objective of todays visit was to review the transitional care plan with the patient. We discussed the importance of transitional care as it relates to reducing the likelihood of readmission. We reviewed the goals for the first 30 days following hospital discharge. The patient and I discussed wrap around services including nurse navigation, care coordination, home health, transitional care appointments with their primary care provider and specialist team and the role of dispatch health. Patient education focused on readmission zones as described as The Red Zone: High risk for readmission, days 1-21 The Yellow Zone: Moderate risk for readmission, days 22-29 The Green Zone: Lower risk for readmission, days 30 and after Navigator discussed discharge disposition and patient stated \"I am going to have to go to a rehab for awhile but I get to come back home\". Patient asked Navigator for list of SNF and one was given to the patient.

## 2018-08-30 NOTE — PROGRESS NOTES
Problem: Falls - Risk of 
Goal: *Absence of Falls Document Charles Ryan Fall Risk and appropriate interventions in the flowsheet. Outcome: Progressing Towards Goal 
Fall Risk Interventions: 
Mobility Interventions: Patient to call before getting OOB, Utilize walker, cane, or other assistive device Mentation Interventions: Door open when patient unattended, Increase mobility, More frequent rounding, Room close to nurse's station, Toileting rounds, Update white board Medication Interventions: Patient to call before getting OOB, Teach patient to arise slowly Elimination Interventions: Call light in reach, Patient to call for help with toileting needs History of Falls Interventions: Door open when patient unattended, Room close to nurse's station

## 2018-08-30 NOTE — ROUTINE PROCESS
Bedside and Verbal shift change report given to Shankar Borjas (oncoming nurse) by Aayush Curtis RN 
 (offgoing nurse). Report included the following information SBAR, Kardex, Intake/Output and MAR.

## 2018-08-30 NOTE — ROUTINE PROCESS
Bedside and Verbal shift change report given to  Nirmala Smith (oncoming nurse) by Arianna Bernabe RN 
(offgoing nurse). Report included the following information SBAR, MAR and Recent Results.

## 2018-08-30 NOTE — PROGRESS NOTES
Problem: Mobility Impaired (Adult and Pediatric) Goal: *Acute Goals and Plan of Care (Insert Text) Physical Therapy Goals Initiated 8/25/2018 and to be accomplished within 7 day(s) 1. Patient will move from supine to sit and sit to supine, scoot up and down and roll side to side in bed with contact guard. 2.  Patient will transfer from bed to chair and chair to bed with contact guard using the least restrictive device. 3.  Patient will perform sit to stand with contact guard. 4.  Patient will ambulate with minimum assist for >25 feet with the least restrictive device. Outcome: Progressing Towards Goal 
physical Therapy TREATMENT Patient: London Lemus (82 y.o. female) Date: 8/30/2018 Diagnosis: CVA (cerebral vascular accident) ([facility] UTI (urinary tract infection) Confusion CVA (cerebral vascular accident) ([facility] Precautions:    
Chart, physical therapy assessment, plan of care and goals were reviewed. OBJECTIVE/ ASSESSMENT: 
Patient found supine HOB elevated eating willing to work with PT. Pt performed sup to sit w/ HOB lowered from start position req min A for elevation and trunk and sat at EOB w/ good static sitting balance and slight LOB laterally w/ donning new gown. Pt performed sit to stand from EOB req min A and cueing for sequencing. At standing at EOB, pt began urinating, thus req pad change, repositioned to sit at EOB and increased time to clean pt and clean room to ensure safe environment. Once pt cared for, pt returned to sitting, improving sup to sit to SBA displaying good carry over from earlier cueing. Pt performed sit to stand again and trained in amb in room. Pt amb 12'X3 in room initially demonstrating short shuffled steps, however improved step legnth/height w/ cueing improving overall gait quality and speed.  Pt performed sitting rest break in chair between each gait attempt progressing to CGA w/ cueing to sit <> stand from chair. During last gait trail, pt performed standing tap ups to further improve step clearance, demonstrating good carry over w/ cont cueing. Pt returned to chair w/ all needs in reach on chair alarm. Pt left in room and nurse notified. Pt cont to display function improvement during tx w/ good carry over from prior txs. Pt also voiced improvement of swallowing as pt has not had any issues swallowing food recently. 2/2 to pt's improvement w/ functional mobility during today's tx, further rehab is still recommended to maximize safety and mobility to return to OF. Education:safety, research on possible placement if desired acute rehab remains not an option Progression toward goals: 
[]      Improving appropriately and progressing toward goals [x]      Improving slowly and progressing toward goals 
[]      Not making progress toward goals and plan of care will be adjusted PLAN: 
Patient continues to benefit from skilled intervention to address the above impairments. Continue treatment per established plan of care. Discharge Recommendations:  Inpatient Rehab Further Equipment Recommendations for Discharge:  rolling walker SUBJECTIVE:  
Patient stated I really don't want to go to a nursing home, my  went there and it was like they never saw him.  OBJECTIVE DATA SUMMARY:  
Critical Behavior: 
Neurologic State: Alert, Appropriate for age Orientation Level: Oriented X4 Cognition: Follows commands Safety/Judgement: Fall prevention Functional Mobility Training: 
Bed Mobility: 
Rolling: Stand-by assistance Supine to Sit: Minimum assistance;Stand-by assistance Sit to Supine: Minimum assistance Scooting: Minimum assistance Transfers: 
Sit to Stand: Minimum assistance;Contact guard assistance Stand to Sit: Minimum assistance;Contact guard assistance Balance: 
Sitting: Intact; With support Sitting - Static: Good (unsupported) Sitting - Dynamic: Fair (occasional) Standing: Impaired; With support Standing - Static: Fair Standing - Dynamic : Fair Ambulation/Gait Training: 
Distance (ft): 36 Feet (ft) Assistive Device: Walker, rolling Ambulation - Level of Assistance: Minimal assistance;Contact guard assistance Gait Abnormalities: Decreased step clearance;Shuffling gait Base of Support: Narrowed; Center of gravity altered Speed/Linnea: Slow Step Length: Right shortened;Left shortened Therapeutic Exercises:  
 
 
EXERCISE Sets Reps Active Active Assist  
Passive Self- assited ROM Comments Standing toe taps  1 10  [x] [] [] [] Quad Sets   [] [] [] [] Glut Sets   [] [] [] [] Short Arc Quads   [] [] [] [] Heel Slides   [] [] [] [] Straight Leg Raises   [] [] [] [] Hip Abd   [] [] [] [] Long Arc Quads   [] [] [] [] Seated Marching   [] [] [] [] Seated Knee Flexion   [] [] [] []   
Standing Marching   [] [] [] []   
 
Pain: no pain Activity Tolerance:  
Fatigue levels improved during tx, having improved ability to participate Please refer to the flowsheet for vital signs taken during this treatment. After treatment:  
[x] Patient left in no apparent distress sitting up in chair 
[] Patient left in no apparent distress in bed 
[x] Call bell left within reach [x] Nursing notified 
[] Caregiver present [x] Chair alarm activated 
[] SCDs applied 
[] Ice applied [employer], PTA Time Calculation: 57 mins Mobility [id number] Current  CJ= 20-39%. The severity rating is based on the Level of Assistance required for Functional Mobility and ADLs. Mobility   Goal  CI= 1-19%. The severity rating is based on the Level of Assistance required for Functional Mobility and ADLs.

## 2018-08-30 NOTE — PROGRESS NOTES
This pt's daughter Yaneth Ching [phone],CRD resides in [place] and pt asked that the daughter be called to assist this writer with discharge planning to a local facility. Daughter reports concern regarding this pt living alone and possibly exploring moving this pt to [place]. Although this daughter has spoken with this pt, pt did not discuss the listing this writer and daughter discussed alternatives and placement SNF near this pt's home. Daughter is in agreement to a safe discharge and plan for possible safe discharge to a facility rather than home. Pt's daughter chose [facility] and [place] [id number]. Pt's daughter was informed of a planned discharge in the a.m. This pt was accepted by [facility] and Rehabilitation pt and daughter notified of this acceptance and planned discharge in the a.m.

## 2018-08-31 VITALS
RESPIRATION RATE: 17 BRPM | OXYGEN SATURATION: 97 % | WEIGHT: 120.2 LBS | HEIGHT: 60 IN | HEART RATE: 92 BPM | DIASTOLIC BLOOD PRESSURE: 81 MMHG | TEMPERATURE: 98 F | SYSTOLIC BLOOD PRESSURE: 144 MMHG | BODY MASS INDEX: 23.6 KG/M2

## 2018-08-31 LAB
BACTERIA SPEC CULT: NORMAL
SERVICE CMNT-IMP: NORMAL

## 2018-08-31 PROCEDURE — 74011250637 HC RX REV CODE- 250/637: Performed by: INTERNAL MEDICINE

## 2018-08-31 PROCEDURE — 97116 GAIT TRAINING THERAPY: CPT

## 2018-08-31 PROCEDURE — 97530 THERAPEUTIC ACTIVITIES: CPT

## 2018-08-31 RX ORDER — ASPIRIN 325 MG
325 TABLET ORAL DAILY
Qty: 30 TAB | Refills: 0 | Status: SHIPPED | OUTPATIENT
Start: 2018-09-01

## 2018-08-31 RX ORDER — DILTIAZEM HYDROCHLORIDE 30 MG/1
30 TABLET, FILM COATED ORAL
Qty: 90 TAB | Refills: 0 | Status: SHIPPED | OUTPATIENT
Start: 2018-08-31

## 2018-08-31 RX ADMIN — APIXABAN 2.5 MG: 2.5 TABLET, FILM COATED ORAL at 08:58

## 2018-08-31 RX ADMIN — DILTIAZEM HYDROCHLORIDE 30 MG: 30 TABLET, FILM COATED ORAL at 07:30

## 2018-08-31 RX ADMIN — ASPIRIN 325 MG ORAL TABLET 325 MG: 325 PILL ORAL at 08:58

## 2018-08-31 RX ADMIN — Medication 10 ML: at 06:03

## 2018-08-31 RX ADMIN — LOVASTATIN 40 MG: 20 TABLET ORAL at 08:58

## 2018-08-31 NOTE — PROGRESS NOTES
Met with patient and daughter Vignesh Castrejon at bedside. Verified SNF choice of [place] [address]. Informed daughter and patient that patient will be discharged today to [facility] and Rehab. Discussed medical transport requirements under Medicare. Daughter to transport to [place] [address]. Spoke with Edwin Branham at [facility] and [address] [place] [address]. Patient is accepted and can come today. Informed Edwin Branham that discharge summary would be sent over as soon as it is ready and that daughter will transport. Call report to [facility] and [address] [place] [address] [address] at [phone].

## 2018-08-31 NOTE — PROGRESS NOTES
Transition of Care (VALENTINE) Plan:  [facility] Rehab 
 
[facility] Transportation: How is patient being transported at discharge? Daughter to tranpsort When? This afternoon once discharge summary completed Is transport scheduled? No daughter to transport Follow-up appointment and transportation: 
 
 PCP? N/A Specialist? 
 
 Time/Date? Who is transporting to the follow-up appointment? Is transport for follow up appointment scheduled? Communication plan (with patient/family): Who is being called? Patient or Next of Kin? Responsible party? Jessenia Rides daughter What number(s) is to be used? [phone] What service provider is calling for [facility] services? N/A being discharged to SNF When are they calling? Readmission Risk? (Green/Low; Yellow/Moderate; Red/High): Moderate yellow To be discharged to [facility] and Rehab for skilled nursing care and therapy. Daughter to transport.

## 2018-08-31 NOTE — ROUTINE PROCESS
Bedside and Verbal shift change report given to KATJA RN (oncoming nurse) by Lucina Soriano RN (offgoing nurse). Report given with SBAR, Kardex, Intake/Output, MAR, Accordion and Recent Results.

## 2018-08-31 NOTE — PROGRESS NOTES
Per Tracie Soto (WAYNE) cancelled transport. Informed Juan Finney transport has been cancelled. Per [address] [place] [address] () called [facility] set up transport for 12:30 pm to [facility] and [place] with Letty Dave. Informed [place] of transportation arrangements.

## 2018-08-31 NOTE — PROGRESS NOTES
Spoke with Carla Nassar at [facility] and [address] [place] [address]. Informed that discharge summary will be uploaded into [employer]. Informed Carla Nassar that daughter will be transporting patient to facility this afternoon.

## 2018-08-31 NOTE — PROGRESS NOTES
Per Diana Blank (CM) uploaded discharge summary in [facility] for [facility]. Informed Diana Blank discharge summary has been uploaded.

## 2018-08-31 NOTE — ROUTINE PROCESS
Report called to MICHAEL Johnson,Cher @ [facility] 9045. 
[id number] to use [facility] prior to discharge. Richy to transport to facility. [phone] Discharged to car via wheelchair.

## 2018-08-31 NOTE — PROGRESS NOTES
Problem: Mobility Impaired (Adult and Pediatric) Goal: *Acute Goals and Plan of Care (Insert Text) Physical Therapy Goals Initiated 8/25/2018 and to be accomplished within 7 day(s) 1. Patient will move from supine to sit and sit to supine, scoot up and down and roll side to side in bed with contact guard. 2.  Patient will transfer from bed to chair and chair to bed with contact guard using the least restrictive device. 3.  Patient will perform sit to stand with contact guard. 4.  Patient will ambulate with minimum assist for >25 feet with the least restrictive device. Outcome: Progressing Towards Goal 
physical Therapy TREATMENT Patient: Lor Hui (74 y.o. female) Date: 8/31/2018 Diagnosis: CVA (cerebral vascular accident) ([facility] [address] UTI (urinary tract infection) Confusion CVA (cerebral vascular accident) ([facility] [address] Precautions:    
Chart, physical therapy assessment, plan of care and goals were reviewed. OBJECTIVE/ ASSESSMENT: 
Patient found seated in recliner w/ daughter at side willing to work with PT. Pt performed sit <> stand transfers from mult surfaces (chair, BSC, WC) initially req min A, especially from lower surface of BSC, however progressed to CGA w/ cueing for alignment. Pt amb w/ RW for 40' X2 req constant cueing to increase step length/height to improve step clearance and efficiency. Pt demonstrated good carry over w/ cueing improving over all quality w/ improved energy conservation as pt voiced less fatigue w/ greater amb distance. Pt req 1 seated rest break in WC in royal, recovered well, and returned to room w/ all needs in reach in chair. During amb, pt also demonstrated right sided UE/LE weakness > left sided as pt demonstrated greater difficulty advancing right LE during gait w/ asymmetrical step length.  Pt also demonstrated right UE weakness during walker negotiation displaying greater difficulty pushing walker to maintain a straight path as well as difficulty fully gripping handle, pt would veer to right side w/ amb req cueing for walker negotiation. Pt left in room w/ daughter in chair and nurse notified of progression. Education:safety, importance of sequencing and symmetrical use of UEs/LEs/ during transfers and gait, importance of communication w/ clinical staff  at cont rehab Progression toward goals: 
[x]      Improving appropriately and progressing toward goals 
[]      Improving slowly and progressing toward goals 
[]      Not making progress toward goals and plan of care will be adjusted PLAN: 
Patient continues to benefit from skilled intervention to address the above impairments. Continue treatment per established plan of care. If pt remains in hospital stay for any reason, pt would benefit from PT re-evaluation. Discharge Recommendations:  Rehab Further Equipment Recommendations for Discharge:  rolling walker SUBJECTIVE:  
Patient stated I look good, but I can't say I feel the same.  OBJECTIVE DATA SUMMARY:  
Critical Behavior: 
Neurologic State: Alert Orientation Level: Oriented X4 Cognition: Follows commands Safety/Judgement: Fall prevention Functional Mobility Training: 
Transfers: 
Sit to Stand: Minimum assistance;Contact guard assistance Stand to Sit: Contact guard assistance;Stand-by assistance Balance: 
Sitting: Intact; With support Sitting - Static: Good (unsupported) Standing: Impaired; With support Standing - Static: Good Standing - Dynamic : Fair Ambulation/Gait Training: 
Distance (ft): 80 Feet (ft) Assistive Device: Walker, rolling Ambulation - Level of Assistance: Contact guard assistance Gait Abnormalities: Decreased step clearance;Shuffling gait Base of Support: Center of gravity altered;Narrowed Speed/Linnea: Pace decreased (<100 feet/min); Shuffled Step Length: Right shortened;Left shortened Pain: 0/10 Activity Tolerance: Fatigues w/ amb, however has improved Please refer to the flowsheet for vital signs taken during this treatment. After treatment:  
[x] Patient left in no apparent distress sitting up in chair 
[] Patient left in no apparent distress in bed 
[x] Call bell left within reach [x] Nursing notified 
[x] Caregiver present [x] Chair alarm activated 
[] SCDs applied 
[] Ice applied Juany Roscoe, KRISH Time Calculation: 26 mins Mobility [id number] Current  CJ= 20-39%. The severity rating is based on the Level of Assistance required for Functional Mobility and ADLs. Mobility   Goal  CI= 1-19%. The severity rating is based on the Level of Assistance required for Functional Mobility and ADLs.

## 2018-09-04 ENCOUNTER — PATIENT OUTREACH (OUTPATIENT)
Dept: INTERNAL MEDICINE CLINIC | Age: 83
End: 2018-09-04

## 2018-09-04 ENCOUNTER — TELEPHONE (OUTPATIENT)
Dept: INTERNAL MEDICINE CLINIC | Age: 83
End: 2018-09-04

## 2018-09-04 NOTE — TELEPHONE ENCOUNTER
pts daughter Fatou Carrion, drop off FMLA papers for RM to fill out.   Pt had a stroke and daughter was her caretaker she is now in rehab    Please call [phone] when forms are ready

## 2018-09-04 NOTE — TELEPHONE ENCOUNTER
Called patient's daughter to let her know Dr. Barbara Rucker was out of the office and would return on the 10 th to fill out FMLA forms.

## 2018-09-04 NOTE — PROGRESS NOTES
Transition of Care Coordination/Hospital to Post Acute Facility: 
  
Date/Time:  2018 10:01 AM 
 
Patient was [facility] 18 and discharged on 18 for acute CVA. Patient was transferred to [facility] and Rehab for continuation of care. Inpatient RRAT score: 19 Top Challenges reviewed None Method of communication with care team :none Nurse Navigator(NN) spoke with Jaqueline Irwin,  to provide introduction to self and explanation of the Nurse Navigator Role. Verified name and  as patient identifiers. Verified admission to facility. Call transferred to unit 3. Spoke with Ms. Teresa Miramontes. Call placed on hold for primary nurse. NN on hold 10 mins. NN returned call to facility.  attempted to transfer call again to unit 3. NN remained on hold 8 mins. ACP:  
Does the patient have a current ACP (including DDNR):  no   
 
Medication(s):  
New Medications at Discharge: Eliquis, Cardizem Changed Medications at Discharge: ASA Discontinued Medications at Discharge: [facility] PCP/Specialist follow up: Future Appointments Date Time Provider Nori Grande 2018 11:00 AM Andrea Ash MD [facility] [address]  
2019 10:20 AM John Yoder MD [address] [place] [address] Opportunity to ask questions was provided. Contact information was provided for future reference or further questions. Will continue to monitor.

## 2018-09-11 NOTE — TELEPHONE ENCOUNTER
Daughter called - she needs ROMEL ASAP- says she will loose her job if she doesn't get them turned in

## 2018-09-13 NOTE — TELEPHONE ENCOUNTER
Spoke with patient's daughter and due to weather issues it will be impossible for her to get here. Paperwork will be faxed to [facility] which has secure fax lines.

## 2018-09-18 ENCOUNTER — PATIENT OUTREACH (OUTPATIENT)
Dept: INTERNAL MEDICINE CLINIC | Age: 83
End: 2018-09-18

## 2018-09-18 NOTE — PROGRESS NOTES
[address] [facility]  to follow up on admission to SNF. Spoke with Cesar Motley, . Introduced self and reason for call. Provided 2 patient identifiers. Patient remains a current admission. Anticipated discharge date is unknown at this time. Nurse navigator will continue to monitor.

## 2018-10-02 ENCOUNTER — PATIENT OUTREACH (OUTPATIENT)
Dept: INTERNAL MEDICINE CLINIC | Age: 83
End: 2018-10-02

## 2018-10-02 ENCOUNTER — TELEPHONE (OUTPATIENT)
Dept: INTERNAL MEDICINE CLINIC | Age: 83
End: 2018-10-02

## 2018-10-02 NOTE — PROGRESS NOTES
[address] [facility]  to follow up on admission to SNF. Spoke with Ericka Coyne, . Introduced self and reason for call. Provided 2 patient identifiers. Patient remains a current admission. Call transferred to [address] [place] [address] office. Left message for Mahad Handler including introduction of self, role, reason for call and 2 patient identifiers. Requested return call. Provided direct contact information.

## 2018-10-02 NOTE — PROGRESS NOTES
Returned call to Kemi Richter at [facility] and [address] [place] [address]. Left message to return call. Direct contact info provided.

## 2018-10-03 ENCOUNTER — PATIENT OUTREACH (OUTPATIENT)
Dept: INTERNAL MEDICINE CLINIC | Age: 83
End: 2018-10-03

## 2018-10-03 NOTE — PROGRESS NOTES
Spoke with Sharon Baez, discharge director. Claudia Chapa verbalized patient to be discharged on 10/4/18 to [facility].

## 2018-10-05 ENCOUNTER — PATIENT OUTREACH (OUTPATIENT)
Dept: INTERNAL MEDICINE CLINIC | Age: 83
End: 2018-10-05

## 2018-10-05 NOTE — PROGRESS NOTES
Hospital Discharge Follow-Up Date/Time:  10/5/2018 4:33 PM 
 
Patient was admitted to [facility] on 18 and discharged on 18 for acute CVA. Patient was subsequently transferred to [facility] and Rehab for continuation of care from 18 to 10/4/18. The physician discharge summary was available at the time of outreach. Contact within 1 business days of discharge. Nurse Navigator (NN) contacted the [facility] by telephone to perform post hospital discharge assessment. Verified name and  with Lexi Alvarez LPN as identifiers. Provided introduction to self, and explanation of the Nurse Navigator role. Nurse Jeronimo verbalized she was in the middle of a med pass and requested NN give her a call back. Will attempt to contact facility at a later time.

## 2018-10-08 ENCOUNTER — PATIENT OUTREACH (OUTPATIENT)
Dept: INTERNAL MEDICINE CLINIC | Age: 83
End: 2018-10-08

## 2018-10-08 RX ORDER — AMOXICILLIN 250 MG
1 CAPSULE ORAL 2 TIMES DAILY
COMMUNITY

## 2018-10-08 RX ORDER — POTASSIUM CHLORIDE 750 MG/1
20 TABLET, FILM COATED, EXTENDED RELEASE ORAL 2 TIMES DAILY
COMMUNITY
End: 2020-06-27

## 2018-10-08 NOTE — PROGRESS NOTES
Hospital Discharge Follow-Up Date/Time:  10/8/2018 1:38 PM 
 
[facility] and discharged on 18 for CVA. Patient was transferred to [facility] and Rehab 18-10/5/18 for continuation of care. The physician discharge summary was available at the time of outreach. Contact made within 2 business days of discharge. Top Challenges reviewed with the provider ACP discussion Method of communication with provider :chart routing Inpatient RRAT score: 19 Was this a readmission? no  
  
Nurse Navigator (NN) contacted [facility] by telephone to perform post hospital discharge assessment. Verified name and  with Tiffanie Lee LPN as identifiers. Provided introduction to self, and explanation of the Nurse Navigator role. Unsteady gait or off balance/weakness:  YES/NO: NO 
Double/Blurred vision: YES/NO: NO Facial drooping/drooping smile or eye: YES/NO: NO Weakness or paralysis of arm/leg or any part of the body: YES/NO: NO  
Numbness/tingling/decreased sensation of face (lower mouth): NO Slurred speech/difficulty speaking or writing (tongue feeling fat): NO Difficulty swallowing or drooling: YES; on dysphagia diet (thickened liquids, honey consistency and pureed solids) Caregiver denied SOB, CP, palpitations, dizziness, fatigue, chills, fever, N/V. Caregiver given an opportunity to ask questions and does not have any further questions or concerns at this time. The caregiver agrees to contact the PCP office for questions related to their healthcare. NN provided contact information for future reference. Disease Specific:   N/A Summary of patient's top problems: 1. Paroxysmal  A-fib 2. CVA 3. CAD Home Health orders at discharge: none Barriers to care? No apparent or voiced barriers to care noted at this time. Advance Care Planning:  
Does patient have an Advance Directive:  not on file Medication(s):  
 New Medications at Discharge: Eliquis, Cardizem Changed Medications at Discharge:  Discontinued Medications at Discharge: Aleve Medication reconciliation was performed with caregiver, who verbalizes understanding of administration of home medications. There were no barriers to obtaining medications identified at this time. Referral to Pharm D needed: no  
 
Current Outpatient Prescriptions Medication Sig  Lactobac. rhamnosus GG-inulin (CULTURELLE PROBIOTICS) 10 billion cell -200 mg cap Take 2 Caps by mouth daily.  potassium chloride SR (KLOR-CON 10) 10 mEq tablet Take 20 mEq by mouth two (2) times a day.  senna-docusate (SENNA PLUS) 8.6-50 mg per tablet Take 1 Tab by mouth two (2) times a day.  aspirin (ASPIRIN) 325 mg tablet Take 1 Tab by mouth daily.  apixaban (ELIQUIS) 2.5 mg tablet Take 1 Tab by mouth two (2) times a day.  dilTIAZem (CARDIZEM) 30 mg tablet Take 1 Tab by mouth Before breakfast, lunch, and dinner.  furosemide (LASIX) 20 mg tablet 1 tablet by mouth daily  polyethylene glycol (MIRALAX) 17 gram packet Take 17 g by mouth daily.  promethazine (PHENERGAN) 25 mg tablet Take 1 Tab by mouth every six (6) hours as needed for Nausea.  losartan (COZAAR) 50 mg tablet Take 1 Tab by mouth daily.  tolterodine (DETROL) 2 mg tablet Take 1 Tab by mouth two (2) times a day.  carvedilol (COREG) 12.5 mg tablet take 1 tablet by mouth twice a day  lovastatin (MEVACOR) 20 mg tablet Take 1 Tab by mouth daily.  multivitamin (ONE A DAY) tablet Take 1 Tab by mouth daily.  omeprazole (PRILOSEC) 20 mg capsule Take 20 mg by mouth daily as needed.  ascorbic acid (VITAMIN C) 250 mg tablet Take 1 Tab by mouth two (2) times daily (with meals). [Request refills from PCP (Dr. Kofi Chapman)]  cholecalciferol (VITAMIN D3) 1,000 unit tablet Take 2 Tabs by mouth daily. [Request refills from PCP (Dr. Kofi Chapman)] No current facility-administered medications for this visit. Medications Discontinued During This Encounter Medication Reason  Lactobacillus acidophilus (ACIDOPHILUS) cap Not A Current Medication  hydroCHLOROthiazide (MICROZIDE) 12.5 mg capsule Not A Current Medication  DOCOSAHEXANOIC ACID/EPA (FISH OIL PO) Not A Current Medication [facility] follow up appointment(s): Future Appointments Date Time Provider Nori Christiane 10/10/2018 2:30 PM Chicho Murphy MD [facility] [address]  
1/29/2019 10:20 AM Domonique Russ MD [address] [place] [address] Non-[facility] follow up appointment(s): N/A [facility]:  out of service area Contacted Ungus Steven, daughter (listed on PHI) to inform of upcoming appt. Left message identifying self, role, reason for call, appt info and contact information.

## 2018-10-08 NOTE — Clinical Note
[address] 8/24-8/31; [facility] 8/31-10/5 VALENTINE ([id number]) appt 10/10/18 at 230 PM

## 2018-10-11 ENCOUNTER — PATIENT OUTREACH (OUTPATIENT)
Dept: INTERNAL MEDICINE CLINIC | Age: 83
End: 2018-10-11

## 2018-10-11 NOTE — PROGRESS NOTES
Complex Case Management  Follow-Up Date/Time:  10/11/2018 3:37 PM 
  
NN contacted Collette Thompson, daughter for Complex Case Management  follow up. PHI verified. Introduced self/role and reason for call. Daughter verbalized she cancelled PCP appt because she is unable to get off work. Verbalized patient will continue to see Dr. Todd Alves. Daughter verbalized she would like to patient a couple more weeks to see how patient is doing. Daughter reported patient tires easily. Patient does have home health for PT/OT and plans to have speech added. Advised daughter contact Dr. Rishi Galloway (cardiology) to schedule follow up appt. Daughter verbalized she will call to schedule. Verified daughter has contact info for Dr. Rishi Galloway. [address]: [facility] ([phone]) Barriers to care? transportation; daughter unable to get off work to provide transportation and attend appts with patient Specialist appointments since last outreach? no 
  
Daughter reminded that there are physicians on call 24 hours a day / 7 days a week (M-F 5pm to 8am and from Friday 5pm until Monday 8a for the weekend) should the patient have questions or concerns. Future Appointments Date Time Provider Nori Grande 1/29/2019 10:20 AM Nicky Arredondo  [facility] Other upcoming appointments: N/A Goals  Understands red flags post discharge. 10/8/18: Staff able to identify red flags to report to PCP.

## 2018-10-25 ENCOUNTER — PATIENT OUTREACH (OUTPATIENT)
Dept: INTERNAL MEDICINE CLINIC | Age: 83
End: 2018-10-25

## 2018-10-25 NOTE — PROGRESS NOTES
Spoke with Mely Jordan, daughter. Daughter verbalized patient not doing so great. Tired all the time. Everything tires her out; with bathing and dressing. Did get flu shot last week. Will get documentation for office as well as an updated med list. Daughter verbalized BP medicine was discontinued at the rehab. Scheduled October 30 at 2:30 PM with PCP. Using walker all the time now; was just using walker when she was out. Daughter denied patient feeling depressed. Thinks patient is just tired.

## 2018-10-30 ENCOUNTER — PATIENT OUTREACH (OUTPATIENT)
Dept: INTERNAL MEDICINE CLINIC | Age: 83
End: 2018-10-30

## 2018-10-30 ENCOUNTER — OFFICE VISIT (OUTPATIENT)
Dept: INTERNAL MEDICINE CLINIC | Age: 83
End: 2018-10-30

## 2018-10-30 VITALS
HEIGHT: 60 IN | RESPIRATION RATE: 14 BRPM | SYSTOLIC BLOOD PRESSURE: 108 MMHG | WEIGHT: 121 LBS | OXYGEN SATURATION: 97 % | DIASTOLIC BLOOD PRESSURE: 56 MMHG | BODY MASS INDEX: 23.75 KG/M2 | TEMPERATURE: 98 F | HEART RATE: 83 BPM

## 2018-10-30 DIAGNOSIS — Z86.73 STATUS POST CVA: Primary | ICD-10-CM

## 2018-10-30 DIAGNOSIS — I10 ESSENTIAL HYPERTENSION: ICD-10-CM

## 2018-10-30 DIAGNOSIS — I48.20 CHRONIC ATRIAL FIBRILLATION (HCC): ICD-10-CM

## 2018-10-30 NOTE — PROGRESS NOTES
Goals Addressed This Visit's Progress  Attends follow-up appointments as directed. On track 10/25/18: Scheduled PCP follow up with daughter. Daughter will provide transportation. 10/30/18: Patient attended appt with PCP as scheduled.

## 2018-10-30 NOTE — PROGRESS NOTES
Deonte Hameed,born 12/29/1928, is a 80 y.o. female, who is seen today for reevaluation after recent stroke. She developed difficulty with swallowing and weakness and now she is diffusely weak but no focal weakness. She was hospitalized and then discharged August 31 and was given skilled nursing facility until she was discharged October 4 and her daughter really did not want her seen until she recovered a bit more so she comes in now for follow-up. She is taking all of her medicine correctly. She is having no lightheadedness or dizziness when she is standing and she does use a walker at home with wheels. No chest pain or dyspnea. Appetite is good now that she is not on puréed diet anymore. Past Medical History:   Diagnosis Date    Allergic rhinitis     Anemia     Asymptomatic carotid bruit     asymptomatic    Atrial fibrillation ([address] 04/2017    Started on Xarelto    Atrial fibrillation ([facility] [address]     Cardiac cath 12/15/2010    pRCA 30%. LM patent. LAD 25%. CX 30%. LVEDP 10 mmHg. EF 60%. Mild AS.  Cardiac nuclear imaging test, low to mod risk 02/22/2016    Low to intermediate risk. Sm reversible inferoapical defect may be a sm area of ischemia. No prior infarction. EF >75%. Neg EKG on pharm stress test.    Carotid duplex 02/10/2014    Mild 1-49% bilateral ICA stenosis.      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  senna-docusate (SENNA PLUS) 8.6-50 mg per tablet Take 1 Tab by mouth two (2) times a day.  aspirin (ASPIRIN) 325 mg tablet Take 1 Tab by mouth daily. 30 Tab 0    apixaban (ELIQUIS) 2.5 mg tablet Take 1 Tab by mouth two (2) times a day. 60 Tab 0    dilTIAZem (CARDIZEM) 30 mg tablet Take 1 Tab by mouth Before breakfast, lunch, and dinner. 90 Tab 0    furosemide (LASIX) 20 mg tablet 1 tablet by mouth daily 90 Tab 0    polyethylene glycol (MIRALAX) 17 gram packet Take 17 g by mouth daily.  promethazine (PHENERGAN) 25 mg tablet Take 1 Tab by mouth every six (6) hours as needed for Nausea. 30 Tab 0    losartan (COZAAR) 50 mg tablet Take 1 Tab by mouth daily. 90 Tab 3    tolterodine (DETROL) 2 mg tablet Take 1 Tab by mouth two (2) times a day. 180 Tab 3    carvedilol (COREG) 12.5 mg tablet take 1 tablet by mouth twice a day 180 Tab 3    lovastatin (MEVACOR) 20 mg tablet Take 1 Tab by mouth daily. 90 Tab 3    multivitamin (ONE A DAY) tablet Take 1 Tab by mouth daily.  omeprazole (PRILOSEC) 20 mg capsule Take 20 mg by mouth daily as needed.  ascorbic acid (VITAMIN C) 250 mg tablet Take 1 Tab by mouth two (2) times daily (with meals). [Request refills from PCP (Dr. José Miguel Chapman)] 30 Tab 0    cholecalciferol (VITAMIN D3) 1,000 unit tablet Take 2 Tabs by mouth daily. [Request refills from PCP (Dr. José Miguel Chapman)] 30 Tab 0    Lactobac. rhamnosus GG-inulin (CULTURELLE PROBIOTICS) 10 billion cell -200 mg cap Take 2 Caps by mouth daily.        Visit Vitals  /56   Pulse 83   Temp 98 °F (36.7 °C) (Oral)   Resp 14   Ht 5' (1.524 m)   Wt 121 lb (54.9 kg)   SpO2 97%   BMI 23.63 kg/m²     Carotids are 2+ without bruits. No JVD or HJR. Lungs are clear to percussion. Good breath sounds with no wheezing or crackles. Heart reveals an irregularly irregular rhythm with no murmur gallop click or rub. Apical impulse is not palpable. Abdomen is soft and nontender with no hepatosplenic megaly or masses and no bruits. Extremities reveal no clubbing cyanosis or edema. Speech is normal.  She is in a wheelchair but arms and legs seem to be equally strong, 4 out of 5 throughout. Assessment: #1. Recent stroke now diffusely weak but with no problem swallowing or focal weakness. She will continue aspirin 325 mg daily. #2.  Chronic atrial fibrillation with rate controlled, she will continue current medication including Eliquis twice a day. #3.  Diffuse weakness in this elderly lady after a fairly prolonged skilled nursing stay and not eating well on puréed diet but weight is unchanged from 2 months ago,  before her stroke, this weakness is likely to gradually improved somewhat but at age 80 she may be left fairly weak for the longer term. #4. She is on carvedilol though ejection fraction recently was 55%. #5.  Blood pressure is a little bit low but she needs her current medicine for rate control and heart function and blood pressure is more often than not around 120 at home and it is checked frequently by all of the caregivers. Follow-up in 4 months or sooner if needed and she had a flu shot recently and she will see her cardiologist in January as planned for her yearly checkup. We likely will check some lab when she is here. Barry Patricia MD FACP    Please note: This document has been produced using voice recognition software. Unrecognized errors in transcription may be present.

## 2018-11-05 ENCOUNTER — PATIENT OUTREACH (OUTPATIENT)
Dept: INTERNAL MEDICINE CLINIC | Age: 83
End: 2018-11-05

## 2018-11-05 NOTE — PROGRESS NOTES
Patient has graduated from the Complex Case Management  program on 11/5/18. Patient's symptoms are stable at this time. Patient/family has the ability to self-manage. Care management goals have been completed at this time. No further nurse navigator follow up scheduled. Goals Addressed                 This Visit's Progress     COMPLETED: Attends follow-up appointments as directed. 10/25/18: Scheduled PCP follow up with daughter. Daughter will provide transportation. 10/30/18: Patient attended appt with PCP as scheduled.  COMPLETED: Understands red flags post discharge. 10/8/18: Staff able to identify red flags to report to PCP. Pt has nurse navigator's contact information for any further questions, concerns, or needs.   Patients upcoming visits:    Future Appointments   Date Time Provider Nori Grande   1/29/2019 10:20 AM Mireya Mccain MD [address] [place] [address]   2/27/2019  2:30 PM Maria Ramirez MD [facility]

## 2019-01-29 ENCOUNTER — OFFICE VISIT (OUTPATIENT)
Dept: CARDIOLOGY CLINIC | Age: 84
End: 2019-01-29

## 2019-01-29 VITALS
DIASTOLIC BLOOD PRESSURE: 78 MMHG | WEIGHT: 121 LBS | HEART RATE: 91 BPM | BODY MASS INDEX: 23.75 KG/M2 | OXYGEN SATURATION: 98 % | HEIGHT: 60 IN | SYSTOLIC BLOOD PRESSURE: 118 MMHG

## 2019-01-29 DIAGNOSIS — I25.10 ATHEROSCLEROSIS OF NATIVE CORONARY ARTERY OF NATIVE HEART WITHOUT ANGINA PECTORIS: Chronic | ICD-10-CM

## 2019-01-29 DIAGNOSIS — I51.9 DIASTOLIC DYSFUNCTION, LEFT VENTRICLE: ICD-10-CM

## 2019-01-29 DIAGNOSIS — I48.20 CHRONIC ATRIAL FIBRILLATION (HCC): Primary | ICD-10-CM

## 2019-01-29 DIAGNOSIS — R06.09 DOE (DYSPNEA ON EXERTION): ICD-10-CM

## 2019-01-29 DIAGNOSIS — I10 ESSENTIAL HYPERTENSION WITH GOAL BLOOD PRESSURE LESS THAN 140/90: ICD-10-CM

## 2019-01-29 NOTE — PROGRESS NOTES
Kateryna Roberts presents today for   Chief Complaint   Patient presents with    Follow-up     6 month - no cardiac complaints       Neelima Hameed preferred language for health care discussion is english/other. Is someone accompanying this pt? Yes, daughter     Is the patient using any DME equipment during OV? Rolator     Depression Screening:  PHQ over the last two weeks 10/12/2015   Little interest or pleasure in doing things Several days   Feeling down, depressed, irritable, or hopeless Several days   Total Score PHQ 2 2       Learning Assessment:  Learning Assessment 6/3/2015   PRIMARY LEARNER Patient   HIGHEST LEVEL OF EDUCATION - PRIMARY LEARNER  2 YEARS OF COLLEGE   BARRIERS PRIMARY LEARNER -   CO-LEARNER CAREGIVER -   PRIMARY LANGUAGE ENGLISH    NEED -   LEARNER PREFERENCE PRIMARY READING     DEMONSTRATION   ANSWERED BY patient   RELATIONSHIP SELF       Abuse Screening:  Abuse Screening Questionnaire 8/26/2016   Do you ever feel afraid of your partner? N   Are you in a relationship with someone who physically or mentally threatens you? N   Is it safe for you to go home? Y       Fall Risk  Fall Risk Assessment, last 12 mths 10/30/2018   Able to walk? No   Fall in past 12 months? -   Fall with injury? -   Number of falls in past 12 months -   Fall Risk Score -       Pt currently taking Antiplatelet therapy? ASA and Eliquis    Coordination of Care:  1. Have you been to the ER, urgent care clinic since your last visit? Hospitalized since your last visit? 8/24/2018 - 8/31/2018 for CVA     2. Have you seen or consulted any other health care providers outside of the [address] [place] [address] [place] since your last visit? Include any pap smears or colon screening.  No

## 2019-01-29 NOTE — PROGRESS NOTES
HISTORY OF PRESENT ILLNESS  Juancho Meyers is a 80 y.o. female. HPI    She appears to be a little frail and weak and showing her age. She is having some difficulties with swallowing. She also has some issues with handwriting. She is no longer able to write beautifully as she used to be able to. She does not have any weakness or paralysis of the extremities. She is able to communicate well. She denies chest pain, dyspnea, orthopnea, PND. She has no palpitation, dizziness or syncope. She has had no symptoms to indicate TIA, amaurosis fugax, or recurrent stroke. She apparently had CVA and was hospitalized between 8/24/18 and 8/31/18. She was taken off Xarelto by her urologist because of the hematuria before she had a stroke. During the hospitalization, she had an echocardiogram on 8/26/18 which demonstrated normal left ventricular systolic function with EF in the 55% range. There was moderate pulmonary hypertension with PA pressure estimated at 46 mmHg. There was no significant valvular pathology. She has a history of successful PTCA of the LAD in March 1995 and has had no recurrent angina ever since. She had a repeat cardiac catheterization in March 1995 for chest pain, which revealed no evidence of restenosis . Her noninvasive carotid studies on March 19, 2007, demonstrated 50% stenosis of the distal right internal carotid artery, which was not severe enough to warrant surgical intervention. She underwent stress nuclear cardiac imaging on May 11, 2009, which was a normal imaging, with no evidence of scarring or ischemia and no interval change in comparison to the study of March 2007.    Because of the continued chest pain despite the negative stress and EKG and cardiac imaging, she underwent the cardiac catheterization on 12/15/2010, which was reviewed.    1. Patient dominant RCA with the 30% of proximal stenosis. 2. Patent left main trunk. .  3. Patient LAD with the diffuse 20 to 30 % narrowing throughout. 4. Patient circumflex artery with diffuse 30% stenosis in the mid segment. 5. Normal left ventricular systolic function with EF in the 60% range. 6. Mild aortic stenosis with the pressure gradient of 10 to 20 mmHg on pullback. It was felt that her chest pain was noncardiac in nature, and the continued medical treatment was recommended.    [facility] hem[facility]tysis. She was evaluated by endoscopy examination and found to have a hiatal hernia but no source of bleeding. She was subsequently evaluated by pulmonology and was found to have obstructive lesion of left upper lobe. A bronchoscopy was done on 12/5/11 which demonstrated obstructive upper left lobe with hemorrhage of entire bronchial tree suggestive of hemoptysis rather than hematemesis from the GI system. 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 She has been under the care of Dr. Emilia Hunt for hemoptysis and abnormal CT scan and chest X-ray but no decision has been made thus far. She underwent endoscopic examination on 10/21/13 and was found to have gastric ulcers, which were treated with Nexium. She is now just taking Prilosec occasionally. She has had stress nuclear cardiac imaging on 2/10/14, which demonstrated normal perfusion with no evidence of scarring or ischemia. The ejection fraction was in the 80% range. She underwent noninvasive carotid study to evaluate asymptomatic carotid bruit on 2/10/14, which demonstrated bilateral 1-49% stenosis of the internal carotid arteries, which was felt to be not severe enough to cause any symptoms or warrant treatment.    She continues to be followed by a pulmonologist for some mass like lesion in the lungs by chest X-ray and CT scan, but it has been negative for malignancy thus far.  She has had occasional hemoptysis and it is not clear if it ever has been related to the lung lesions. She is now under the care of Dr. Carol Mei, as Dr. Bambi Forman has retired.    She underwent stress nuclear cardiac imaging on 02/22/16, which demonstrated a very small focal area of mild ischemia in the inferior apex. LV function was normal with EF in the 75% range. For the evaluation of her chest pain, she underwent stress nuclear cardiac imaging on 03/02/2017, which demonstrated normal perfusion with no evidence of scarring or ischemia.  Ejection fraction was greater than 70%.  She underwent a Holter monitor on 03/06/2017 to evaluate the heart rate response, which demonstrated baseline atrial fibrillation with a minimum heart rate of 58 and a maximum heart rate of 141 with an average heart rate of 88.  There were no significant pauses.  Her BUN/creatinine were 36 and 0.81 on 02/28/2017. Review of Systems   Constitutional: Positive for malaise/fatigue. Negative for weight loss. HENT: Negative for hearing loss. Eyes: Negative for blurred vision and double vision. Respiratory: Positive for shortness of breath. Cardiovascular: Positive for palpitations. Negative for chest pain, orthopnea, claudication, leg swelling and PND. Gastrointestinal: Negative for blood in stool, heartburn and melena. Genitourinary: Negative for dysuria, frequency, hematuria and urgency. Musculoskeletal: Positive for back pain and joint pain. Skin: Negative for itching and rash. Neurological: Negative for dizziness, loss of consciousness and weakness. Psychiatric/Behavioral: Negative for depression and memory loss. Physical Exam   Constitutional: She is oriented to person, place, and time. She appears well-developed and well-nourished. HENT:   Head: Normocephalic and atraumatic. Eyes: Conjunctivae are normal. Pupils are equal, round, and reactive to light. Neck: Normal range of motion. Neck supple. No JVD present.    Cardiovascular: Normal rate, S1 normal and S2 normal. An irregularly irregular rhythm present. No extrasystoles are present. PMI is not displaced. Exam reveals no gallop and no friction rub. No murmur heard. Pulses:       Carotid pulses are 3+ on the right side, and 3+ on the left side. Pulmonary/Chest: Effort normal. She has no rales. Abdominal: Soft. There is no tenderness. Musculoskeletal: She exhibits no edema. Neurological: She is alert and oriented to person, place, and time. No cranial nerve deficit. Skin: Skin is warm and dry. Psychiatric: She has a normal mood and affect. Her behavior is normal.     Visit Vitals  /78   Pulse 91   Ht 5' (1.524 m)   Wt 54.9 kg (121 lb)   SpO2 98%   BMI 23.63 kg/m²       Past Medical History:   Diagnosis Date    Allergic rhinitis     Anemia     Asymptomatic carotid bruit     asymptomatic    Atrial fibrillation ([address] 04/2017    Started on Xarelto    Atrial fibrillation ([address]     Cardiac cath 12/15/2010    pRCA 30%. LM patent. LAD 25%. CX 30%. LVEDP 10 mmHg. EF 60%. Mild AS.  Cardiac nuclear imaging test, low to mod risk 02/22/2016    Low to intermediate risk. Sm reversible inferoapical defect may be a sm area of ischemia. No prior infarction. EF >75%. Neg EKG on pharm stress test.    Carotid duplex 02/10/2014    Mild 1-49% bilateral ICA stenosis.      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 Zach Peralta Hypertension     Lung collapse Dec 2011    Collapse of left upper lobe    Mild aortic stenosis 8/22/2011    Osteoarthritis     Osteoporosis     Positive PPD        Social History     Socioeconomic History    Marital status:      Spouse name: Not on file    Number of children: Not on file    Years of education: Not on file    Highest education level: Not on file   Social Needs    Financial resource strain: Not on file    Food insecurity - worry: Not on file    Food insecurity - inability: Not on file   Ludei needs - medical: Not on file   Ludei needs - non-medical: Not on file   Occupational History    Not on file   Tobacco Use    Smoking status: Never Smoker    Smokeless tobacco: Never Used    Tobacco comment: extensive secondhand smoke exposure through job   Substance and Sexual Activity    Alcohol use: No     Alcohol/week: 0.0 oz    Drug use: No    Sexual activity: No   Other Topics Concern    Not on file   Social History Narrative    ** Merged History Encounter **            Family History   Problem Relation Age of Onset    Heart Disease Mother         History of CHF    Heart Disease Father     Heart Attack Father     Heart Disease Sister     Cancer Brother         non-Hodgkin lymphoma    Cancer Brother         liver cancer    Heart Disease Brother     Stroke Brother     Parkinsonism Sister     Parkinsonism Brother        Past Surgical History:   Procedure Laterality Date    HX CATARACT REMOVAL      HX COLONOSCOPY      HX HEART CATHETERIZATION  12/15/10    negative    HX HYSTERECTOMY      HX KNEE ARTHROSCOPY      HX PTCA  3/1995    of the LAD; has had no recurrent angina since    HX TONSILLECTOMY      SC BRONCHOSCOPY BRONCHIAL/ENDOBRNCL BX 1+ SITES  9/25/2012            Current Outpatient Medications   Medication Sig Dispense Refill    Lactobac. rhamnosus GG-inulin (CULTURELLE PROBIOTICS) 10 billion cell -200 mg cap Take 2 Caps by mouth daily.  potassium chloride SR (KLOR-CON 10) 10 mEq tablet Take 20 mEq by mouth two (2) times a day.  senna-docusate (SENNA PLUS) 8.6-50 mg per tablet Take 1 Tab by mouth two (2) times a day.  aspirin (ASPIRIN) 325 mg tablet Take 1 Tab by mouth daily. 30 Tab 0    apixaban (ELIQUIS) 2.5 mg tablet Take 1 Tab by mouth two (2) times a day. 60 Tab 0    dilTIAZem (CARDIZEM) 30 mg tablet Take 1 Tab by mouth Before breakfast, lunch, and dinner. 90 Tab 0    furosemide (LASIX) 20 mg tablet 1 tablet by mouth daily 90 Tab 0    polyethylene glycol (MIRALAX) 17 gram packet Take 17 g by mouth daily.  promethazine (PHENERGAN) 25 mg tablet Take 1 Tab by mouth every six (6) hours as needed for Nausea. 30 Tab 0    losartan (COZAAR) 50 mg tablet Take 1 Tab by mouth daily. 90 Tab 3    tolterodine (DETROL) 2 mg tablet Take 1 Tab by mouth two (2) times a day. 180 Tab 3    carvedilol (COREG) 12.5 mg tablet take 1 tablet by mouth twice a day 180 Tab 3    lovastatin (MEVACOR) 20 mg tablet Take 1 Tab by mouth daily. 90 Tab 3    multivitamin (ONE A DAY) tablet Take 1 Tab by mouth daily.  omeprazole (PRILOSEC) 20 mg capsule Take 20 mg by mouth daily as needed.  ascorbic acid (VITAMIN C) 250 mg tablet Take 1 Tab by mouth two (2) times daily (with meals). [Request refills from PCP (Dr. Shawn Chapman)] 30 Tab 0    cholecalciferol (VITAMIN D3) 1,000 unit tablet Take 2 Tabs by mouth daily. [Request refills from PCP (Dr. Shawn Chapman)] 30 Tab 0       EKG: unchanged from previous tracings, atrial fibrillation, rate controlled  . ASSESSMENT and PLAN  Encounter Diagnoses   Name Primary?    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 She is now on Eliquis with no issues with hematuria or other hemorrhage. We discussed the high risk of embolic stroke in the future. As long as she does not have any massive hemorrhage issue, I would recommend that Eliquis be continued.

## 2019-02-27 ENCOUNTER — OFFICE VISIT (OUTPATIENT)
Dept: INTERNAL MEDICINE CLINIC | Age: 84
End: 2019-02-27

## 2019-02-27 ENCOUNTER — TELEPHONE (OUTPATIENT)
Dept: INTERNAL MEDICINE CLINIC | Age: 84
End: 2019-02-27

## 2019-02-27 VITALS
RESPIRATION RATE: 12 BRPM | SYSTOLIC BLOOD PRESSURE: 110 MMHG | DIASTOLIC BLOOD PRESSURE: 60 MMHG | WEIGHT: 122.2 LBS | TEMPERATURE: 97.5 F | HEART RATE: 88 BPM | OXYGEN SATURATION: 97 % | HEIGHT: 60 IN | BODY MASS INDEX: 23.99 KG/M2

## 2019-02-27 DIAGNOSIS — I10 ESSENTIAL HYPERTENSION: ICD-10-CM

## 2019-02-27 DIAGNOSIS — I48.20 CHRONIC ATRIAL FIBRILLATION (HCC): Primary | ICD-10-CM

## 2019-02-27 DIAGNOSIS — L85.3 DRY SKIN: ICD-10-CM

## 2019-02-27 DIAGNOSIS — E78.5 HYPERLIPIDEMIA LDL GOAL <100: ICD-10-CM

## 2019-02-27 RX ORDER — AMMONIUM LACTATE 12 G/100G
LOTION TOPICAL
Qty: 500 G | Refills: 2 | Status: SHIPPED | OUTPATIENT
Start: 2019-02-27

## 2019-02-27 RX ORDER — TOLTERODINE TARTRATE 2 MG/1
2 TABLET, EXTENDED RELEASE ORAL 2 TIMES DAILY
Qty: 180 TAB | Refills: 3 | Status: SHIPPED | OUTPATIENT
Start: 2019-02-27 | End: 2019-02-27 | Stop reason: SDUPTHER

## 2019-02-27 RX ORDER — TOLTERODINE TARTRATE 2 MG/1
2 TABLET, EXTENDED RELEASE ORAL 2 TIMES DAILY
Qty: 180 TAB | Refills: 3 | Status: SHIPPED | OUTPATIENT
Start: 2019-02-27

## 2019-02-27 NOTE — PROGRESS NOTES
Yadira Hameed,born 12/29/1928, is a 80 y.o. female, who is seen today for reevaluation of hypertension, paroxysmal atrial fibrillation, prior fractures, hypertension. She complains currently of a 2-month history of dry skin all over and even with the emollients she is using she is still quite itchy though it does help somewhat. He is taking all of her medicine correctly, she lives in assisted living. She uses a walker sometimes such as right now when she is here with her daughter but other times a wheelchair since she is gradually gotten weaker and more dyspneic. She feels most of the time she breathes well and has no exertional chest pain. She needs a refill on Detrol that helps her with overactive bladder and incontinence. Past Medical History:  
Diagnosis Date  Allergic rhinitis  Anemia  Asymptomatic carotid bruit   
 asymptomatic  Atrial fibrillation ([address] 04/2017 Started on Xarelto  Atrial fibrillation ([address]  Cardiac cath 12/15/2010  
 pRCA 30%. LM patent. LAD 25%. CX 30%. LVEDP 10 mmHg. EF 60%. Mild AS.  Cardiac nuclear imaging test, low to mod risk 02/22/2016 Low to intermediate risk. Sm reversible inferoapical defect may be a sm area of ischemia. No prior infarction. EF >75%. Neg EKG on pharm stress test.  
 Carotid duplex 02/10/2014 Mild 1-49% bilateral ICA stenosis.  Chronic anemia  Chronic kidney disease  CKD (chronic kidney disease) stage 2, GFR 60-89 ml/min  Closed bilateral fracture of pubic rami ([address] 5/15/2014 Acute pubic bone fracture on both sides of the symphysis pubis  Coronary artery disease Status post PTCA of the LAD in March 1995/ EF 70%  Coronary artery disease  Dyslipidemia  Dysphagia  Gastroesophageal reflux disease  Gross hematuria  Heart problem  Histoplasmosis Oct 2012 With probable bronchiectasis and localized AV malformations at left upper lobe with recurrent hemoptysis  History of peptic ulcer Dr. Bárbara Jaramillo  Hypertension  Lung collapse Dec 2011 Collapse of left upper lobe  Mild aortic stenosis 8/22/2011  Osteoarthritis  Osteoporosis  Positive PPD Current Outpatient Medications Medication Sig Dispense Refill  ammonium lactate (LAC-HYDRIN) 12 % lotion Apply once daily after bath or shower 500 g 2  
 tolterodine (DETROL) 2 mg tablet Take 1 Tab by mouth two (2) times a day. [address] rhamnosus GG-inulin (CULTURELLE PROBIOTICS) 10 billion cell -200 mg cap Take 2 Caps by mouth daily.  potassium chloride SR (KLOR-CON 10) 10 mEq tablet Take 20 mEq by mouth two (2) times a day.  senna-docusate (SENNA PLUS) 8.6-50 mg per tablet Take 1 Tab by mouth two (2) times a day.  aspirin (ASPIRIN) 325 mg tablet Take 1 Tab by mouth daily. 30 Tab 0  
 apixaban (ELIQUIS) 2.5 mg tablet Take 1 Tab by mouth two (2) times a day. 60 Tab 0  
 dilTIAZem (CARDIZEM) 30 mg tablet Take 1 Tab by mouth Before breakfast, lunch, and dinner. 90 Tab 0  
 furosemide (LASIX) 20 mg tablet 1 tablet by mouth daily 90 Tab 0  
 polyethylene glycol (MIRALAX) 17 gram packet Take 17 g by mouth daily.  promethazine (PHENERGAN) 25 mg tablet Take 1 Tab by mouth every six (6) hours as needed for Nausea. 30 Tab 0  
 losartan (COZAAR) 50 mg tablet Take 1 Tab by mouth daily. 90 Tab 3  carvedilol (COREG) 12.5 mg tablet take 1 tablet by mouth twice a day 180 Tab 3  
 lovastatin (MEVACOR) 20 mg tablet Take 1 Tab by mouth daily. 90 Tab 3  
 multivitamin (ONE A DAY) tablet Take 1 Tab by mouth daily.  omeprazole (PRILOSEC) 20 mg capsule Take 20 mg by mouth daily as needed.  ascorbic acid (VITAMIN C) 250 mg tablet Take 1 Tab by mouth two (2) times daily (with meals). [Request refills from PCP (Dr. Marialuisa Chapman)] 30 Tab 0  cholecalciferol (VITAMIN D3) 1,000 unit tablet Take 2 Tabs by mouth daily. [Request refills from PCP (Dr. Marialuisa Chapman)] 30 Tab 0 Visit Vitals /60 (BP 1 Location: Left arm, BP Patient Position: Sitting) Pulse 88 Temp 97.5 °F (36.4 °C) (Oral) Resp 12 Ht 5' (1.524 m) Wt 122 lb 3.2 oz (55.4 kg) SpO2 97% BMI 23.87 kg/m² Carotids are 2+ without bruits. Lungs are clear to percussion. Good breath sounds with no wheezing or crackles. Heart reveals an irregularly irregular rhythm with no murmur gallop click or rub. Apical impulse is not palpable. Abdomen is soft and nontender with no hepatosplenic megaly or masses and no bruits. Edema except there is 1+ edema of the left ankle only. Pulses are intact. Skin is mildly dry and there are some superficial excoriations of the lower back and left flank. Assessment: #1. Paroxysmal atrial fibrillation with rate controlled. She will continue Eliquis twice a day. #2. She is more fragile and will use her wheelchair when needed and walker with to avoid falls. #3.  Very dry skin point of excoriations, try Lac-Hydrin for the remainder of the winter and things should improve when the humidity increases. Daughter understands this. #4.  History of hypertension with blood pressure very well controlled. Continue no added salt diet and current medicine. Follow-up in early August and we will check a full set of lab done, sooner if needed Barry Lyon, [address] Please note: This document has been produced using voice recognition software. Unrecognized errors in transcription may be present.

## 2019-05-22 ENCOUNTER — OFFICE VISIT (OUTPATIENT)
Dept: PULMONOLOGY | Age: 84
End: 2019-05-22

## 2019-05-22 VITALS
SYSTOLIC BLOOD PRESSURE: 96 MMHG | DIASTOLIC BLOOD PRESSURE: 60 MMHG | WEIGHT: 121 LBS | RESPIRATION RATE: 16 BRPM | OXYGEN SATURATION: 96 % | TEMPERATURE: 97.5 F | HEIGHT: 60 IN | HEART RATE: 82 BPM | BODY MASS INDEX: 23.75 KG/M2

## 2019-05-22 DIAGNOSIS — J98.11 ATELECTASIS: Primary | ICD-10-CM

## 2019-05-22 NOTE — PROGRESS NOTES
Laurel Beard presents today for   Chief Complaint   Patient presents with    Asthma     CXR done 8/24    Shortness of Breath       Is someone accompanying this pt? Daughter Main Miller    Is the patient using any DME equipment during [address]? w/c    -DME Company no    Depression Screening:  3 most recent PHQ Screens 10/12/2015   Little interest or pleasure in doing things Several days   Feeling down, depressed, irritable, or hopeless Several days   Total Score PHQ 2 2       Learning Assessment:  Learning Assessment 6/3/2015   PRIMARY LEARNER Patient   HIGHEST LEVEL OF EDUCATION - PRIMARY LEARNER  2 YEARS OF COLLEGE   BARRIERS PRIMARY LEARNER -   CO-LEARNER CAREGIVER -   PRIMARY LANGUAGE ENGLISH    NEED -   LEARNER PREFERENCE PRIMARY READING     DEMONSTRATION   ANSWERED BY patient   RELATIONSHIP SELF       Abuse Screening:  Abuse Screening Questionnaire 8/26/2016   Do you ever feel afraid of your partner? N   Are you in a relationship with someone who physically or mentally threatens you? N   Is it safe for you to go home? Y       Fall Risk  Fall Risk Assessment, last 12 mths 2/27/2019   Able to walk? Yes   Fall in past 12 months? No   Fall with injury? -   Number of falls in past 12 months -   Fall Risk Score -         Coordination of Care:  1. Have you been to the ER, urgent care clinic since your last visit? Hospitalized since your last visit? Yes; Name: [facility]    2. Have you seen or consulted any other health care providers outside of the [address] [place] [address] since your last visit? Include any pap smears or colon screening.  Anna Marie

## 2019-05-22 NOTE — PROGRESS NOTES
[facility]  Pulmonary, Critical Care, and Sleep Medicine      Chief complaint:  Partial atelectasis left lung    HPI:    Ana Hartley    is 80years old and comes to the office today for a follow-up visit related to partial atelectasis of her left upper lung and relates that she has had a stroke and is very limited in her activity she walks around the room she has no shortness of breath but any other type of activity such as longer walks or as her daughter explained trying on close store causes dyspnea. She also can have audible wheezes but says she is not coughing or choking on food and denies chest pain but does report some mild leg edema has no associated pain      Allergies   Allergen Reactions    Bactrim [Sulfamethoprim Ds] Nausea and Vomiting    Vicodin [Hydrocodone-Acetaminophen] Nausea Only    Atenolol Other (comments)     interolance    Atenolol Other (comments)     Black out    Codeine Other (comments)     headache    Erythromycin Unknown (comments)    Erythromycin Hives and Itching    Other Medication Not Reported This Time     Refined sugar    Pcn [Penicillins] Hives    Penicillin G Hives and Itching     Current Outpatient Medications   Medication Sig    tolterodine (DETROL) 2 mg tablet Take 1 Tab by mouth two (2) times a day.  potassium chloride SR (KLOR-CON 10) 10 mEq tablet Take 20 mEq by mouth two (2) times a day.  senna-docusate (SENNA PLUS) 8.6-50 mg per tablet Take 1 Tab by mouth two (2) times a day.  aspirin (ASPIRIN) 325 mg tablet Take 1 Tab by mouth daily.  apixaban (ELIQUIS) 2.5 mg tablet Take 1 Tab by mouth two (2) times a day.  dilTIAZem (CARDIZEM) 30 mg tablet Take 1 Tab by mouth Before breakfast, lunch, and dinner.  furosemide (LASIX) 20 mg tablet 1 tablet by mouth daily    polyethylene glycol (MIRALAX) 17 gram packet Take 17 g by mouth daily.     promethazine (PHENERGAN) 25 mg tablet Take 1 Tab by mouth every six (6) hours as needed for Nausea.  losartan (COZAAR) 50 mg tablet Take 1 Tab by mouth daily.  carvedilol (COREG) 12.5 mg tablet take 1 tablet by mouth twice a day    lovastatin (MEVACOR) 20 mg tablet Take 1 Tab by mouth daily.  multivitamin (ONE A DAY) tablet Take 1 Tab by mouth daily.  omeprazole (PRILOSEC) 20 mg capsule Take 20 mg by mouth daily as needed.  ascorbic acid (VITAMIN C) 250 mg tablet Take 1 Tab by mouth two (2) times daily (with meals). [Request refills from PCP (Dr. Rashida Chapman)]    cholecalciferol (VITAMIN D3) 1,000 unit tablet Take 2 Tabs by mouth daily. [Request refills from PCP (Dr. Rashida Chapman)]    ammonium lactate (LAC-HYDRIN) 12 % lotion Apply once daily after bath or shower    Lactobac. rhamnosus GG-inulin (CULTURELLE PROBIOTICS) 10 billion cell -200 mg cap Take 2 Caps by mouth daily. No current facility-administered medications for this visit. Past Medical History:   Diagnosis Date    Allergic rhinitis     Anemia     Asymptomatic carotid bruit     asymptomatic    Atrial fibrillation ([facility] [address] 04/2017    Started on Xarelto    Atrial fibrillation ([facility] [address]     Cardiac cath 12/15/2010    pRCA 30%. LM patent. LAD 25%. CX 30%. LVEDP 10 mmHg. EF 60%. Mild AS.  Cardiac nuclear imaging test, low to mod risk 02/22/2016    Low to intermediate risk. Sm reversible inferoapical defect may be a sm area of ischemia. No prior infarction. EF >75%. Neg EKG on pharm stress test.    Carotid duplex 02/10/2014    Mild 1-49% bilateral ICA stenosis.      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 Shabnam Richardson Hypertension     Lung collapse Dec 2011    Collapse of left upper lobe    Mild aortic stenosis 8/22/2011    Osteoarthritis     Osteoporosis     Positive PPD      Past Surgical History:   Procedure Laterality Date    HX CATARACT REMOVAL      HX COLONOSCOPY      HX HEART CATHETERIZATION  12/15/10    negative    HX HYSTERECTOMY      HX KNEE ARTHROSCOPY      HX PTCA  3/1995    of the LAD; has had no recurrent angina since    HX TONSILLECTOMY      NM BRONCHOSCOPY BRONCHIAL/ENDOBRNCL BX 1+ SITES  9/25/2012          Social History     Socioeconomic History    Marital status:      Spouse name: Not on file    Number of children: Not on file    Years of education: Not on file    Highest education level: Not on file   Occupational History    Not on file   Social Needs    Financial resource strain: Not on file    Food insecurity:     Worry: Not on file     Inability: Not on file    Transportation needs:     Medical: Not on file     Non-medical: Not on file   Tobacco Use    Smoking status: Never Smoker    Smokeless tobacco: Never Used    Tobacco comment: extensive secondhand smoke exposure through job   Substance and Sexual Activity    Alcohol use: No     Alcohol/week: 0.0 oz    Drug use: No    Sexual activity: Never   Lifestyle    Physical activity:     Days per week: Not on file     Minutes per session: Not on file    Stress: Not on file   Relationships    Social connections:     Talks on phone: Not on file     Gets together: Not on file     Attends Yazdanism service: Not on file     Active member of club or organization: Not on file     Attends meetings of clubs or organizations: Not on file     Relationship status: Not on file    Intimate partner violence:     Fear of current or ex partner: Not on file     Emotionally abused: Not on file     Physically abused: Not on file     Forced sexual activity: Not on file   Other Topics Concern    Not on file   Social History Narrative    ** Merged History Encounter **          Family History   Problem Relation Age of Onset    Heart Disease Mother         History of CHF    Heart Disease Father     Heart Attack Father     Heart Disease Sister     Cancer Brother         non-Hodgkin lymphoma    Cancer Brother         liver cancer    Heart Disease Brother     Stroke Brother     Parkinsonism Sister     Parkinsonism Brother        Review of systems:  No recent fever chills poor appetite or weight loss    Physical Exam:  Visit Vitals  BP 96/60 (BP 1 Location: Left arm, BP Patient Position: Sitting)   Pulse 82   Temp 97.5 °F (36.4 °C) (Oral)   Resp 16   Ht 5' (1.524 m)   Wt 54.9 kg (121 lb) Comment: per patient   SpO2 96%   BMI 23.63 kg/m²       Well-developed well-nourished elderly  HEENT: WNL  Lymph node exam: Supraclavicular cervical lymph nodes negative  Chest: Equal symmetrical expansion no dullness no wheezes rales or rubs except in the left upper lung field anteriorly there are inspiratory and expiratory wheezes  Heart: Irregular rhythm no gallop no murmur no JVD peripheral edema in the right lower extremity and trace pretibial edema in the left lower extremity  Extremities: No cyanosis clubbing or calf  Neurological: Alert and oriented      Labs:  O2 sat room air at rest 96%  Chest x-ray 5/22/19/and compared to 4 24/18 chest x-ray personally reviewed shows partial atelectasis elevated diaphragm but seems less elevated on the most recent x-ray but no other changes    Impression:     Chronic left upper lobe atelectasis no worse by chest x-ray or by history chronic wheezing heard compatible with history of chronic atelectasis    Plan:     Call for worsening symptoms otherwise follow-up in 1 year    Jian Cristobal MD , [facility]    CC: Charli Mcgill, Octavio Khoury MD     [address] [facility], [address] [address]     P: [phone]     F: [phone]

## 2019-07-30 ENCOUNTER — OFFICE VISIT (OUTPATIENT)
Dept: CARDIOLOGY CLINIC | Age: 84
End: 2019-07-30

## 2019-07-30 VITALS
WEIGHT: 120 LBS | BODY MASS INDEX: 23.56 KG/M2 | HEART RATE: 73 BPM | HEIGHT: 60 IN | DIASTOLIC BLOOD PRESSURE: 58 MMHG | SYSTOLIC BLOOD PRESSURE: 124 MMHG | OXYGEN SATURATION: 98 %

## 2019-07-30 DIAGNOSIS — I25.10 ATHEROSCLEROSIS OF NATIVE CORONARY ARTERY OF NATIVE HEART WITHOUT ANGINA PECTORIS: ICD-10-CM

## 2019-07-30 DIAGNOSIS — I51.9 DIASTOLIC DYSFUNCTION, LEFT VENTRICLE: ICD-10-CM

## 2019-07-30 DIAGNOSIS — I10 ESSENTIAL HYPERTENSION WITH GOAL BLOOD PRESSURE LESS THAN 140/90: ICD-10-CM

## 2019-07-30 DIAGNOSIS — R06.09 DOE (DYSPNEA ON EXERTION): ICD-10-CM

## 2019-07-30 DIAGNOSIS — I48.20 CHRONIC ATRIAL FIBRILLATION (HCC): Primary | ICD-10-CM

## 2019-07-30 NOTE — PROGRESS NOTES
Eriberto Baum presents today for   Chief Complaint   Patient presents with    Irregular Heart Beat     6 month f/u    Shortness of Breath     with exertion     Palpitations     fluttering once a week        Eriberto Baum preferred language for health care discussion is english/other. Is someone accompanying this pt? yes    Is the patient using any DME equipment during OV? yes    Depression Screening:  3 most recent PHQ Screens 10/12/2015   Little interest or pleasure in doing things Several days   Feeling down, depressed, irritable, or hopeless Several days   Total Score PHQ 2 2       Learning Assessment:  Learning Assessment 6/3/2015   PRIMARY LEARNER Patient   HIGHEST LEVEL OF EDUCATION - PRIMARY LEARNER  2 YEARS OF COLLEGE   BARRIERS PRIMARY LEARNER -   CO-LEARNER CAREGIVER -   PRIMARY LANGUAGE ENGLISH    NEED -   LEARNER PREFERENCE PRIMARY READING     DEMONSTRATION   ANSWERED BY patient   RELATIONSHIP SELF       Abuse Screening:  Abuse Screening Questionnaire 8/26/2016   Do you ever feel afraid of your partner? N   Are you in a relationship with someone who physically or mentally threatens you? N   Is it safe for you to go home? Y       Fall Risk  Fall Risk Assessment, last 12 mths 7/30/2019   Able to walk? Yes   Fall in past 12 months? Yes   Fall with injury? Yes   Number of falls in past 12 months 3   Fall Risk Score 4       Pt currently taking Anticoagulant therapy? yes    Coordination of Care:  1. Have you been to the ER, urgent care clinic since your last visit? Hospitalized since your last visit? yes    2. Have you seen or consulted any other health care providers outside of the [address] [place] [address] [place] since your last visit? Include any pap smears or colon screening.  no

## 2019-07-30 NOTE — PROGRESS NOTES
HISTORY OF PRESENT ILLNESS  Maria M Birch is a 80 y.o. female. HPI  She seems to be doing reasonably well. She is in a wheelchair but she is able to walk with a walker. She indicates that her hand movement is slightly impaired. She does not have any particular paralysis. Her speech seems to be adequate. She indicates that she does have some difficulties with the handwriting and swallowing. She denies chest pain, dyspnea, orthopnea or PND. She indicates that she has been told that her right lung collapsed and she could hear wheezing at times. She does not have any resting dyspnea, orthopnea or PND. She has had very little palpitation. She has had no symptoms to indicate TIA, amaurosis fugax or recurrent stroke. She has a history of successful PTCA of the LAD in March 1995 and has had no recurrent angina ever since. She had a repeat cardiac catheterization in March 1995 for chest pain, which revealed no evidence of restenosis . Her noninvasive carotid studies on March 19, 2007, demonstrated 50% stenosis of the distal right internal carotid artery, which was not severe enough to warrant surgical intervention. She underwent stress nuclear cardiac imaging on May 11, 2009, which was a normal imaging, with no evidence of scarring or ischemia and no interval change in comparison to the study of March 2007.    Because of the continued chest pain despite the negative stress and EKG and cardiac imaging, she underwent the cardiac catheterization on 12/15/2010, which was reviewed.    1. Patient dominant RCA with the 30% of proximal stenosis. 2. Patent left main trunk. .  3. Patient LAD with the diffuse 20 to 30 % narrowing throughout. 4. Patient circumflex artery with diffuse 30% stenosis in the mid segment. 5. Normal left ventricular systolic function with EF in the 60% range. 6. Mild aortic stenosis with the pressure gradient of 10 to 20 mmHg on pullback.   It was felt that her chest pain was noncardiac in nature, and the continued medical treatment was recommended.    She was admitted to [facility] on 12/4/11 with hemoptysis. She was evaluated by endoscopy examination and found to have a hiatal hernia but no source of bleeding. She was subsequently evaluated by pulmonology and was found to have obstructive lesion of left upper lobe. A bronchoscopy was done on 12/5/11 which demonstrated obstructive upper left lobe with hemorrhage of entire bronchial tree suggestive of hemoptysis rather than hematemesis from the GI system. 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 She has been under the care of Dr. Tabatha Lockhart for hemoptysis and abnormal CT scan and chest X-ray but no decision has been made thus far. She underwent endoscopic examination on 10/21/13 and was found to have gastric ulcers, which were treated with Nexium. She is now just taking Prilosec occasionally. She has had stress nuclear cardiac imaging on 2/10/14, which demonstrated normal perfusion with no evidence of scarring or ischemia. The ejection fraction was in the 80% range. She underwent noninvasive carotid study to evaluate asymptomatic carotid bruit on 2/10/14, which demonstrated bilateral 1-49% stenosis of the internal carotid arteries, which was felt to be not severe enough to cause any symptoms or warrant treatment.    She continues to be followed by a pulmonologist for some mass like lesion in the lungs by chest X-ray and CT scan, but it has been negative for malignancy thus far. She has had occasional hemoptysis and it is not clear if it ever has been related to the lung lesions. She is now under the care of Dr. Milton Lopez, as Dr. Tabatha Lockhart has retired.    She underwent stress nuclear cardiac imaging on 02/22/16, which demonstrated a very small focal area of mild ischemia in the inferior apex. LV function was normal with EF in the 75% range. For the evaluation of her chest pain, she underwent stress nuclear cardiac imaging on 03/02/2017, which demonstrated normal perfusion with no evidence of scarring or ischemia.  Ejection fraction was greater than 70%.  She underwent a Holter monitor on 03/06/2017 to evaluate the heart rate response, which demonstrated baseline atrial fibrillation with a minimum heart rate of 58 and a maximum heart rate of 141 with an average heart rate of 88.  There were no significant pauses.  Her BUN/creatinine were 36 and 0.81 on 02/28/2017.    She apparently had CVA and was hospitalized between 8/24/18 and 8/31/18. She was taken off Xarelto by her urologist because of the hematuria before she had a stroke. During the hospitalization, she had an echocardiogram on 8/26/18 which demonstrated normal left ventricular systolic function with EF in the 55% range. There was moderate pulmonary hypertension with PA pressure estimated at 46 mmHg. There was no significant valvular pathology. Review of Systems   Constitutional: Positive for malaise/fatigue. Negative for weight loss. HENT: Negative for hearing loss. Eyes: Negative for blurred vision and double vision. Respiratory: Positive for shortness of breath. Cardiovascular: Positive for palpitations. Negative for chest pain, orthopnea, claudication, leg swelling and PND. Gastrointestinal: Negative for blood in stool, heartburn and melena. Genitourinary: Negative for dysuria, frequency, hematuria and urgency. Musculoskeletal: Positive for back pain and joint pain. Skin: Negative for itching and rash. Neurological: Negative for dizziness and loss of consciousness. Psychiatric/Behavioral: Negative for depression and memory loss. Physical Exam   Constitutional: She is oriented to person, place, and time. She appears well-developed and well-nourished. HENT:   Head: Normocephalic and atraumatic. Eyes: Pupils are equal, round, and reactive to light. Conjunctivae are normal.   Neck: Normal range of motion. Neck supple. No JVD present. Cardiovascular: Normal rate, S1 normal and S2 normal. An irregularly irregular rhythm present. No extrasystoles are present. PMI is not displaced. Exam reveals no gallop and no friction rub. No murmur heard. Pulses:       Carotid pulses are 3+ on the right side, and 3+ on the left side. Pulmonary/Chest: Effort normal. She has wheezes. She has no rales. Diminished BS in Lt base with scattered wheezing   Abdominal: Soft. There is no tenderness. Musculoskeletal: She exhibits no edema. Neurological: She is alert and oriented to person, place, and time. No cranial nerve deficit. Skin: Skin is warm and dry. Psychiatric: She has a normal mood and affect. Her behavior is normal.     Visit Vitals  /58   Pulse 73   Ht 5' (1.524 m)   Wt 54.4 kg (120 lb)   SpO2 98%   BMI 23.44 kg/m²       Past Medical History:   Diagnosis Date    Allergic rhinitis     Anemia     Asymptomatic carotid bruit     asymptomatic    Atrial fibrillation ([facility] [address] 04/2017    Started on Xarelto    Atrial fibrillation ([address]     Cardiac cath 12/15/2010    pRCA 30%. LM patent. LAD 25%. CX 30%. LVEDP 10 mmHg. EF 60%. Mild AS.  Cardiac nuclear imaging test, low to mod risk 02/22/2016    Low to intermediate risk. Sm reversible inferoapical defect may be a sm area of ischemia. No prior infarction. EF >75%. Neg EKG on pharm stress test.    Carotid duplex 02/10/2014    Mild 1-49% bilateral ICA stenosis.      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 Abimbola Gomez Hypertension     Lung collapse Dec 2011    Collapse of left upper lobe    Mild aortic stenosis 8/22/2011    Osteoarthritis     Osteoporosis     Positive PPD        Social History     Socioeconomic History    Marital status:      Spouse name: Not on file    Number of children: Not on file    Years of education: Not on file    Highest education level: Not on file   Occupational History    Not on file   Social Needs    Financial resource strain: Not on file    Food insecurity:     Worry: Not on file     Inability: Not on file    Transportation needs:     Medical: Not on file     Non-medical: Not on file   Tobacco Use    Smoking status: Never Smoker    Smokeless tobacco: Never Used    Tobacco comment: extensive secondhand smoke exposure through job   Substance and Sexual Activity    Alcohol use: No     Alcohol/week: 0.0 standard drinks    Drug use: No    Sexual activity: Never   Lifestyle    Physical activity:     Days per week: Not on file     Minutes per session: Not on file    Stress: Not on file   Relationships    Social connections:     Talks on phone: Not on file     Gets together: Not on file     Attends Orthodox service: Not on file     Active member of club or organization: Not on file     Attends meetings of clubs or organizations: Not on file     Relationship status: Not on file    Intimate partner violence:     Fear of current or ex partner: Not on file     Emotionally abused: Not on file     Physically abused: Not on file     Forced sexual activity: Not on file   Other Topics Concern    Not on file   Social History Narrative    ** Merged History Encounter **            Family History   Problem Relation Age of Onset    Heart Disease Mother         History of CHF    Heart Disease Father     Heart Attack Father     Heart Disease Sister     Cancer Brother         non-Hodgkin lymphoma    Cancer Brother         liver cancer    Heart Disease Brother     Stroke Brother     Parkinsonism Sister     Parkinsonism Brother        Past Surgical History:   Procedure Laterality Date    HX CATARACT REMOVAL      HX COLONOSCOPY      HX HEART CATHETERIZATION  12/15/10    negative    HX HYSTERECTOMY      HX KNEE ARTHROSCOPY      HX PTCA  3/1995    of the LAD; has had no recurrent angina since    HX TONSILLECTOMY      CT BRONCHOSCOPY BRONCHIAL/ENDOBRNCL BX 1+ SITES  9/25/2012            Current Outpatient Medications   Medication Sig Dispense Refill    ammonium lactate (LAC-HYDRIN) 12 % lotion Apply once daily after bath or shower 500 g 2    tolterodine (DETROL) 2 mg tablet Take 1 Tab by mouth two (2) times a day. [address] rhamnosus GG-inulin (CULTURELLE PROBIOTICS) 10 billion cell -200 mg cap Take 2 Caps by mouth daily.  potassium chloride SR (KLOR-CON 10) 10 mEq tablet Take 20 mEq by mouth two (2) times a day.  senna-docusate (SENNA PLUS) 8.6-50 mg per tablet Take 1 Tab by mouth two (2) times a day.  aspirin (ASPIRIN) 325 mg tablet Take 1 Tab by mouth daily. 30 Tab 0    apixaban (ELIQUIS) 2.5 mg tablet Take 1 Tab by mouth two (2) times a day. 60 Tab 0    dilTIAZem (CARDIZEM) 30 mg tablet Take 1 Tab by mouth Before breakfast, lunch, and dinner. 90 Tab 0    furosemide (LASIX) 20 mg tablet 1 tablet by mouth daily 90 Tab 0    polyethylene glycol (MIRALAX) 17 gram packet Take 17 g by mouth daily.  promethazine (PHENERGAN) 25 mg tablet Take 1 Tab by mouth every six (6) hours as needed for Nausea. 30 Tab 0    losartan (COZAAR) 50 mg tablet Take 1 Tab by mouth daily. 90 Tab 3    carvedilol (COREG) 12.5 mg tablet take 1 tablet by mouth twice a day 180 Tab 3    lovastatin (MEVACOR) 20 mg tablet Take 1 Tab by mouth daily.  90 Tab 3    multivitamin (ONE A DAY) tablet Take 1 Tab by mouth daily.  omeprazole (PRILOSEC) 20 mg capsule Take 20 mg by mouth daily as needed.  ascorbic acid (VITAMIN C) 250 mg tablet Take 1 Tab by mouth two (2) times daily (with meals). [Request refills from PCP (Dr. Hussain Chapman)] 30 Tab 0    cholecalciferol (VITAMIN D3) 1,000 unit tablet Take 2 Tabs by mouth daily. [Request refills from PCP (Dr. Hussain Chapman)] 30 Tab 0       EKG: unchanged from previous tracings, atrial fibrillation, rate controlled  . ASSESSMENT and PLAN  Encounter Diagnoses   Name Primary?  Chronic atrial fibrillation (HCC) Yes    Atherosclerosis of native coronary artery of native heart without angina pectoris,PTCA of LAD 1995     WILSON (dyspnea on exertion)     Essential hypertension with goal blood pressure less than 651/04     Diastolic dysfunction, left ventricle    She has been stable. She has had no symptoms to indicate angina or cardiac decompensation. Her atrial fibrillation has been stable with good rate control. She has been essentially asymptomatic from the atrial fibrillation. She will be continuously anticoagulated because of a history of stroke when she went off the anticoagulation.

## 2019-07-30 NOTE — PATIENT INSTRUCTIONS
Preventing Falls: Care Instructions  Your Care Instructions    Getting around your home safely can be a challenge if you have injuries or health problems that make it easy for you to fall. Loose rugs and furniture in walkways are among the dangers for many older people who have problems walking or who have poor eyesight. People who have conditions such as arthritis, osteoporosis, or dementia also have to be careful not to fall. You can make your home safer with a few simple measures. Follow-up care is a key part of your treatment and safety. Be sure to make and go to all appointments, and call your doctor if you are having problems. It's also a good idea to know your test results and keep a list of the medicines you take. How can you care for yourself at home? Taking care of yourself  · You may get dizzy if you do not drink enough water. To prevent dehydration, drink plenty of fluids, enough so that your urine is light yellow or clear like water. Choose water and other caffeine-free clear liquids. If you have kidney, heart, or liver disease and have to limit fluids, talk with your doctor before you increase the amount of fluids you drink. · Exercise regularly to improve your strength, muscle tone, and balance. Walk if you can. Swimming may be a good choice if you cannot walk easily. · Have your vision and hearing checked each year or any time you notice a change. If you have trouble seeing and hearing, you might not be able to avoid objects and could lose your balance. · Know the side effects of the medicines you take. Ask your doctor or pharmacist whether the medicines you take can affect your balance. Sleeping pills or sedatives can affect your balance. · Limit the amount of alcohol you drink. Alcohol can impair your balance and other senses. · Ask your doctor whether calluses or corns on your feet need to be removed.  If you wear loose-fitting shoes because of calluses or corns, you can lose your balance and fall. · Talk to your doctor if you have numbness in your feet. Preventing falls at home  · Remove raised doorway thresholds, throw rugs, and clutter. Repair loose carpet or raised areas in the floor. · Move furniture and electrical cords to keep them out of walking paths. · Use nonskid floor wax, and wipe up spills right away, especially on ceramic tile floors. · If you use a walker or cane, put rubber tips on it. If you use crutches, clean the bottoms of them regularly with an abrasive pad, such as steel wool. · Keep your house well lit, especially Stefanie Guzman, and outside walkways. Use night-lights in areas such as hallways and bathrooms. Add extra light switches or use remote switches (such as switches that go on or off when you clap your hands) to make it easier to turn lights on if you have to get up during the night. · Install sturdy handrails on stairways. · Move items in your cabinets so that the things you use a lot are on the lower shelves (about waist level). · Keep a cordless phone and a flashlight with new batteries by your bed. If possible, put a phone in each of the main rooms of your house, or carry a cell phone in case you fall and cannot reach a phone. Or, you can wear a device around your neck or wrist. You push a button that sends a signal for help. · Wear low-heeled shoes that fit well and give your feet good support. Use footwear with nonskid soles. Check the heels and soles of your shoes for wear. Repair or replace worn heels or soles. · Do not wear socks without shoes on wood floors. · Walk on the grass when the sidewalks are slippery. If you live in an area that gets snow and ice in the winter, sprinkle salt on slippery steps and sidewalks. Preventing falls in the bath  · Install grab bars and nonskid mats inside and outside your shower or tub and near the toilet and sinks. · Use shower chairs and bath benches.   · Use a hand-held shower head that will allow you to sit while showering. · Get into a tub or shower by putting the weaker leg in first. Get out of a tub or shower with your strong side first.  · Repair loose toilet seats and consider installing a raised toilet seat to make getting on and off the toilet easier. · Keep your bathroom door unlocked while you are in the shower. Where can you learn more? Go to http://raine-geovanny.info/. Enter 0476 [id number] in the search box to learn more about \"Preventing Falls: Care Instructions. \"  Current as of: November 7, 2018  Content Version: 12.1  © 9370-7373 [employer]. Care instructions adapted under license by [employer] (which disclaims liability or warranty for this information). If you have questions about a medical condition or this instruction, always ask your healthcare professional. [place] [address] any warranty or liability for your use of this information. How to Get Up Safely After a Fall: Care Instructions  Your Care Instructions    If you have injuries, health problems, or other reasons that may make it easy for you to fall at home, it is a good idea to learn how to get up safely after a fall. Learning how to get up correctly can help you avoid making an injury worse. Also, knowing what to do if you cannot get up can help you stay safe until help arrives. Follow-up care is a key part of your treatment and safety. Be sure to make and go to all appointments, and call your doctor if you are having problems. It's also a good idea to know your test results and keep a list of the medicines you take. How can you care for yourself after a fall? If you think you can get up  First lie still for a few minutes and think about how you feel. If your body feels okay and you think you can get up safely, follow the rest of the steps below:  1. Look for a chair or other piece of furniture that is close to you.   2. Roll onto your side and rest. Roll by turning your head in the direction you want to roll, move your shoulder and arm, then hip and leg in the same direction. 3. Lie still for a moment to let your blood pressure adjust.  4. Slowly push your upper body up, lift your head, and take a moment to rest.  5. Slowly get up on your hands and knees, and crawl to the chair or other stable piece of furniture. 6. Put your hands on the chair. 7. Move one foot forward, and place it flat on the floor. Your other leg should be bent with the knee on the floor. 8. Rise slowly, turn your body, and sit in the chair. Stay seated for a bit and think about how you feel. Call for help. Even if you feel okay, let someone know what happened to you. You might not know that you have a serious injury. If you cannot get up  1. If you think you are injured after a fall or you cannot get up, try not to panic. 2. Call out for help. 3. If you have a phone within reach or you have an emergency call device, use it to call for help. 4. If you do not have a phone within reach, try to slide yourself toward it. If you cannot get to the phone, try to slide toward a door or window or a place where you think you can be heard. 5. Lamb or use an object to make noise so someone might hear you. 6. If you can reach something that you can use for a pillow, place it under your head. Try to stay warm by covering yourself with a blanket or clothing while you wait for help. When should you call for help? Call 911 anytime you think you may need emergency care. For example, call if:    · You passed out (lost consciousness).     · You cannot get up after a fall.     · You have severe pain.    Call your doctor now or seek immediate medical care if:    · You have new or worse pain.     · You are dizzy or lightheaded.     · You hit your head.    Watch closely for changes in your health, and be sure to contact your doctor if:    · You do not get better as expected. Where can you learn more?   Go to http://raine-geovanny.info/. Enter Q363 in the search box to learn more about \"How to Get Up Safely After a Fall: Care Instructions. \"  Current as of: November 7, 2018  Content Version: 12.1  © 5258-6306 [employer]. Care instructions adapted under license by [employer] (which disclaims liability or warranty for this information). If you have questions about a medical condition or this instruction, always ask your healthcare professional. [address] any warranty or liability for your use of this information. Preventing Outdoor Falls: Care Instructions  Your Care Instructions    Worries about falls don't need to keep you indoors. Outdoor activities like walking have big benefits for your health. You will need to watch your step and learn a few safety measures. If you are worried about having a fall outdoors, ask your doctor about exercises, classes, or physical therapy that may help. You can learn ways to gain strength, flexibility, and balance. Ask if it might help to use a cane or walker. You can make your time outdoors safer with a few simple measures. Follow-up care is a key part of your treatment and safety. Be sure to make and go to all appointments, and call your doctor if you are having problems. It's also a good idea to know your test results and keep a list of the medicines you take. How can you prevent falls outdoors? · Wear shoes with firm soles and low heels. If you have to walk on an icy surface, use grippers that can be worn over your shoes in bad weather. · Be extra careful if weather is bad. Walk on the grass when the sidewalks are slick. If you live in a place that gets snow and ice in the winter, sprinkle salt on slippery stairs and sidewalks. · Be careful getting on or off buses and trains or getting in and out of cars. If handrails are available, use them. · Be careful when you cross the street.  Look for crosswalks or places where curb cuts or ramps are present. · Try not to hurry, especially if you are carrying something. · Be cautious in parking lots or garages. There may be curbs or changes in pavement, or the height of the pavement may vary. · Make sure to wear the correct eyeglasses, if you need them. Reading glasses or bifocals can make it harder to see hazards that might be in your way. · If you are walking outdoors for exercise, try to:  ? Walk in well-lighted, well-maintained areas. These include high school or college tracks, shopping malls, and public spaces. ? Walk with a partner. ? Watch out for cracked sidewalks, curbs, changes in the height of the pavement, exposed tree roots, and debris such as fallen leaves or branches. Where can you learn more? Go to http://raine-geovanny.info/. Enter T363 in the search box to learn more about \"Preventing Outdoor Falls: Care Instructions. \"  Current as of: November 7, 2018  Content Version: 12.1  © 0674-0215 [employer]. Care instructions adapted under license by [employer] (which disclaims liability or warranty for this information). If you have questions about a medical condition or this instruction, always ask your healthcare professional. [address] any warranty or liability for your use of this information.

## 2019-08-05 ENCOUNTER — OFFICE VISIT (OUTPATIENT)
Dept: INTERNAL MEDICINE CLINIC | Age: 84
End: 2019-08-05

## 2019-08-05 ENCOUNTER — HOSPITAL ENCOUNTER (OUTPATIENT)
Dept: LAB | Age: 84
Discharge: HOME | End: 2019-08-05
Payer: MEDICARE

## 2019-08-05 VITALS
HEIGHT: 60 IN | SYSTOLIC BLOOD PRESSURE: 124 MMHG | DIASTOLIC BLOOD PRESSURE: 70 MMHG | BODY MASS INDEX: 23.4 KG/M2 | OXYGEN SATURATION: 98 % | RESPIRATION RATE: 14 BRPM | HEART RATE: 82 BPM | WEIGHT: 119.2 LBS | TEMPERATURE: 97.7 F

## 2019-08-05 DIAGNOSIS — E61.1 IRON DEFICIENCY: ICD-10-CM

## 2019-08-05 DIAGNOSIS — I48.0 PAF (PAROXYSMAL ATRIAL FIBRILLATION) (HCC): ICD-10-CM

## 2019-08-05 DIAGNOSIS — E53.8 B12 DEFICIENCY: ICD-10-CM

## 2019-08-05 DIAGNOSIS — E55.9 HYPOVITAMINOSIS D: ICD-10-CM

## 2019-08-05 DIAGNOSIS — R73.01 IMPAIRED FASTING GLUCOSE: ICD-10-CM

## 2019-08-05 DIAGNOSIS — I25.10 ATHEROSCLEROSIS OF NATIVE CORONARY ARTERY OF NATIVE HEART WITHOUT ANGINA PECTORIS: ICD-10-CM

## 2019-08-05 DIAGNOSIS — K21.9 GASTROESOPHAGEAL REFLUX DISEASE WITHOUT ESOPHAGITIS: ICD-10-CM

## 2019-08-05 DIAGNOSIS — E78.5 HYPERLIPIDEMIA LDL GOAL <100: ICD-10-CM

## 2019-08-05 DIAGNOSIS — I48.0 PAF (PAROXYSMAL ATRIAL FIBRILLATION) (HCC): Primary | ICD-10-CM

## 2019-08-05 LAB
ALBUMIN SERPL-MCNC: 4 G/DL (ref 3.4–5)
ALBUMIN/GLOB SERPL: 1.3 {RATIO} (ref 0.8–1.7)
ALP SERPL-CCNC: 93 U/L (ref 45–117)
ALT SERPL-CCNC: 24 U/L (ref 13–56)
ANION GAP SERPL CALC-SCNC: 10 MMOL/L (ref 3–18)
AST SERPL-CCNC: 26 U/L (ref 10–38)
BASOPHILS # BLD: 0 K/UL (ref 0–0.1)
BASOPHILS NFR BLD: 1 % (ref 0–2)
BILIRUB SERPL-MCNC: 0.7 MG/DL (ref 0.2–1)
BUN SERPL-MCNC: 55 MG/DL (ref 7–18)
BUN/CREAT SERPL: 39 (ref 12–20)
CALCIUM SERPL-MCNC: 8.9 MG/DL (ref 8.5–10.1)
CHLORIDE SERPL-SCNC: 103 MMOL/L (ref 100–111)
CHOLEST SERPL-MCNC: 145 MG/DL
CO2 SERPL-SCNC: 29 MMOL/L (ref 21–32)
CREAT SERPL-MCNC: 1.4 MG/DL (ref 0.6–1.3)
DIFFERENTIAL METHOD BLD: ABNORMAL
EOSINOPHIL # BLD: 0.1 K/UL (ref 0–0.4)
EOSINOPHIL NFR BLD: 2 % (ref 0–5)
ERYTHROCYTE [DISTWIDTH] IN BLOOD BY AUTOMATED COUNT: 13.2 % (ref 11.6–14.5)
EST. AVERAGE GLUCOSE BLD GHB EST-MCNC: 123 MG/DL
GLOBULIN SER CALC-MCNC: 3 G/DL (ref 2–4)
GLUCOSE SERPL-MCNC: 96 MG/DL (ref 74–99)
HBA1C MFR BLD: 5.9 % (ref 4.2–5.6)
HCT VFR BLD AUTO: 37 % (ref 35–45)
HDLC SERPL-MCNC: 77 MG/DL (ref 40–60)
HDLC SERPL: 1.9 {RATIO} (ref 0–5)
HGB BLD-MCNC: 12 G/DL (ref 12–16)
IRON SATN MFR SERPL: 46 %
IRON SERPL-MCNC: 155 UG/DL (ref 50–175)
LDLC SERPL CALC-MCNC: 54 MG/DL (ref 0–100)
LIPID PROFILE,FLP: ABNORMAL
LYMPHOCYTES # BLD: 1.3 K/UL (ref 0.9–3.6)
LYMPHOCYTES NFR BLD: 23 % (ref 21–52)
MCH RBC QN AUTO: 32.6 PG (ref 24–34)
MCHC RBC AUTO-ENTMCNC: 32.4 G/DL (ref 31–37)
MCV RBC AUTO: 100.5 FL (ref 74–97)
MONOCYTES # BLD: 0.5 K/UL (ref 0.05–1.2)
MONOCYTES NFR BLD: 9 % (ref 3–10)
NEUTS SEG # BLD: 3.6 K/UL (ref 1.8–8)
NEUTS SEG NFR BLD: 65 % (ref 40–73)
PLATELET # BLD AUTO: 187 K/UL (ref 135–420)
PMV BLD AUTO: 10.7 FL (ref 9.2–11.8)
POTASSIUM SERPL-SCNC: 4.7 MMOL/L (ref 3.5–5.5)
PROT SERPL-MCNC: 7 G/DL (ref 6.4–8.2)
RBC # BLD AUTO: 3.68 M/UL (ref 4.2–5.3)
SODIUM SERPL-SCNC: 142 MMOL/L (ref 136–145)
T4 FREE SERPL-MCNC: 1.2 NG/DL (ref 0.7–1.5)
TIBC SERPL-MCNC: 335 UG/DL (ref 250–450)
TRIGL SERPL-MCNC: 70 MG/DL (ref ?–150)
TSH SERPL DL<=0.05 MIU/L-ACNC: 1.51 UIU/ML (ref 0.36–3.74)
VIT B12 SERPL-MCNC: 858 PG/ML (ref 211–911)
VLDLC SERPL CALC-MCNC: 14 MG/DL
WBC # BLD AUTO: 5.5 K/UL (ref 4.6–13.2)

## 2019-08-05 PROCEDURE — 85025 COMPLETE CBC W/AUTO DIFF WBC: CPT

## 2019-08-05 PROCEDURE — 82306 VITAMIN D 25 HYDROXY: CPT

## 2019-08-05 PROCEDURE — 80061 LIPID PANEL: CPT

## 2019-08-05 PROCEDURE — 84443 ASSAY THYROID STIM HORMONE: CPT

## 2019-08-05 PROCEDURE — 83036 HEMOGLOBIN GLYCOSYLATED A1C: CPT

## 2019-08-05 PROCEDURE — 80053 COMPREHEN METABOLIC PANEL: CPT

## 2019-08-05 PROCEDURE — 82607 VITAMIN B-12: CPT

## 2019-08-05 PROCEDURE — 84439 ASSAY OF FREE THYROXINE: CPT

## 2019-08-05 PROCEDURE — 83540 ASSAY OF IRON: CPT

## 2019-08-05 RX ORDER — KETOTIFEN FUMARATE 0.35 MG/ML
1 SOLUTION/ DROPS OPHTHALMIC 2 TIMES DAILY
COMMUNITY

## 2019-08-05 NOTE — PROGRESS NOTES
May Hameed,born 12/29/1928, is a 80 y.o. female, who is seen today for reevaluation of urinary incontinence, hyperlipidemia, GERD, constipation,  heart failure. She was told by Dr. Conrado Hayes that she had collapse of her lung (partial atelectasis)  but that surgery was not indicated. She had a good report from her cardiologist recently, she has chronic atrial fibrillation. She takes all her medicine correctly. She awakened this morning at 3 AM feeling dyspneic but that has not happened in the past.  She does have some dyspnea on exertion some days. No exertional discomfort in the chest neck jaw back or arm. No orthopnea. Still has urinary incontinence and that got worse with recent change in medicine. She says she is taking generic Detrol but was taking generic Detrol in the past the same dose of her chart today, 2 mg twice a day. She is having no reflux symptoms currently she continues omeprazole. Bowels are working quite well and she uses polyethylene glycol some days but not every day. Past Medical History:   Diagnosis Date    Allergic rhinitis     Anemia     Asymptomatic carotid bruit     asymptomatic    Atrial fibrillation ([address] 04/2017    Started on Xarelto    Atrial fibrillation ([address]     Cardiac cath 12/15/2010    pRCA 30%. LM patent. LAD 25%. CX 30%. LVEDP 10 mmHg. EF 60%. Mild AS.  Cardiac nuclear imaging test, low to mod risk 02/22/2016    Low to intermediate risk. Sm reversible inferoapical defect may be a sm area of ischemia. No prior infarction. EF >75%. Neg EKG on pharm stress test.    Carotid duplex 02/10/2014    Mild 1-49% bilateral ICA stenosis.      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 Bambi Farnsworth Hypertension     Lung collapse Dec 2011    Collapse of left upper lobe    Mild aortic stenosis 8/22/2011    Osteoarthritis     Osteoporosis     Positive PPD      Current Outpatient Medications   Medication Sig Dispense Refill    ketotifen (ZADITOR) 0.025 % (0.035 %) ophthalmic solution Administer 1 Drop to both eyes two (2) times a day.  ammonium lactate (LAC-HYDRIN) 12 % lotion Apply once daily after bath or shower 500 g 2    tolterodine (DETROL) 2 mg tablet Take 1 Tab by mouth two (2) times a day. [address] rhamnosus GG-inulin (CULTURELLE PROBIOTICS) 10 billion cell -200 mg cap Take 2 Caps by mouth daily.  potassium chloride SR (KLOR-CON 10) 10 mEq tablet Take 20 mEq by mouth two (2) times a day.  senna-docusate (SENNA PLUS) 8.6-50 mg per tablet Take 1 Tab by mouth two (2) times a day.  aspirin (ASPIRIN) 325 mg tablet Take 1 Tab by mouth daily. 30 Tab 0    apixaban (ELIQUIS) 2.5 mg tablet Take 1 Tab by mouth two (2) times a day. 60 Tab 0    dilTIAZem (CARDIZEM) 30 mg tablet Take 1 Tab by mouth Before breakfast, lunch, and dinner. 90 Tab 0    furosemide (LASIX) 20 mg tablet 1 tablet by mouth daily 90 Tab 0    polyethylene glycol (MIRALAX) 17 gram packet Take 17 g by mouth daily.  promethazine (PHENERGAN) 25 mg tablet Take 1 Tab by mouth every six (6) hours as needed for Nausea. 30 Tab 0    losartan (COZAAR) 50 mg tablet Take 1 Tab by mouth daily. 90 Tab 3    carvedilol (COREG) 12.5 mg tablet take 1 tablet by mouth twice a day 180 Tab 3    lovastatin (MEVACOR) 20 mg tablet Take 1 Tab by mouth daily.  90 Tab 3    multivitamin (ONE A DAY) tablet Take 1 Tab by mouth daily.  omeprazole (PRILOSEC) 20 mg capsule Take 20 mg by mouth daily as needed.  cholecalciferol (VITAMIN D3) 1,000 unit tablet Take 2 Tabs by mouth daily. [Request refills from PCP (Dr. Leslie Chapman)] 30 Tab 0    ascorbic acid (VITAMIN C) 250 mg tablet Take 1 Tab by mouth two (2) times daily (with meals). [Request refills from PCP (Dr. Leslie Chapman)] 30 Tab 0     Visit Vitals  /70 (BP 1 Location: Left arm, BP Patient Position: Sitting)   Pulse 82   Temp 97.7 °F (36.5 °C) (Oral)   Resp 14   Ht 5' (1.524 m)   Wt 119 lb 3.2 oz (54.1 kg)   SpO2 98%   BMI 23.28 kg/m²     No JVD or HJR. Carotids are 2+ without bruits. Lungs are clear to percussion. Good breath sounds with no wheezing or crackles. Heart reveals an irregularly irregular rhythm with normal S1 and S2 no murmur gallop click or rub. Apical impulse is not palpable. Abdomen is soft and nontender with no hepatosplenomegaly or masses and no bruits. Extremities reveal no clubbing cyanosis or edema. Pulses are intact but diminished in the feet. Assessment: #1. Urinary incontinence is worsened somewhat, unclear why, she is still on generic Detrol 2 mg twice a day. No change for now but her symptoms do not improve I may have her see the urologist again. #2.  History of heart failure asymptomatic, she will continue current medication which includes carvedilol 12.5 mg twice a day and furosemide 20 mg daily. #3. Hyperlipidemia has been doing well, she will continue lovastatin 20 mg each evening. #4.  Constipation doing quite well, she will continue polyethylene glycol 17 g as needed. #5.  Chronic atrial fibrillation with rate controlled, she will continue diltiazem 30 mg 3 times a day and Eliquis 2.5 mg twice a day. #6.  GERD asymptomatic, she will continue omeprazole 20 mg daily.     We will check a battery of labs today in view of all of her health issues including impaired fasting glucose, hypertension, hyperlipidemia, history of anemia. We will call results when available, probably tomorrow. This visit lasted 40 minutes, greater than 50% of the time spent counseling on all of the above issues, urinary incontinence, GERD, angina, GERD, constipation, and need for ongoing treatment with above medicine. Barry Nunn MD FACP    Please note: This document has been produced using voice recognition software. Unrecognized errors in transcription may be present. Follow-up in 6 months or sooner if needed.

## 2019-08-06 ENCOUNTER — TELEPHONE (OUTPATIENT)
Dept: INTERNAL MEDICINE CLINIC | Age: 84
End: 2019-08-06

## 2019-08-06 LAB — 25(OH)D3 SERPL-MCNC: 51.4 NG/ML (ref 30–100)

## 2019-08-06 NOTE — TELEPHONE ENCOUNTER
Patient contacted, patient identified with two identifiers (Name & ). Patient aware of results per DR. Telles and verbalizes understanding.

## 2019-10-11 ENCOUNTER — TELEPHONE (OUTPATIENT)
Dept: INTERNAL MEDICINE CLINIC | Age: 84
End: 2019-10-11

## 2019-10-11 NOTE — TELEPHONE ENCOUNTER
Dariel Phillips from [facility] called stating pt is feeling weak and dizzy her bp is 90/48 pulse 85 resp 22 they are holding pt  LOSARTAN please advise Dariel Phillips [phone]

## 2019-10-16 NOTE — TELEPHONE ENCOUNTER
Received call back from [facility]. The nurse states that the patients blood pressure has been stable on the losartan. She is sending over a BP log for review.  Do you still want to hold the losartan or wait to review the BP log first? If we do hold the medication the order needs to be faxed to [phone]

## 2019-10-16 NOTE — TELEPHONE ENCOUNTER
Patient Call      James Montgomery MD  [facility] 17 hours ago (5:59 PM)      Is this message from 4 days ago? Montrell Escobar would recommend staying off losartan and monitoring blood pressure on a daily basis for a week or so.     Routing comment

## 2019-10-17 NOTE — TELEPHONE ENCOUNTER
Chief Complaint   Patient presents with    Medication Evaluation     regarding blood pressure medication Losartan 50 mg per Dr Mary Grace Parks     [phone]   Thomas Graves MD  [facility] 16 hours ago (5:01 PM)      His blood pressure now has stabilized she may continue losartan.  Blood pressure can be checked on a weekly basis.     Routing comment

## 2019-10-17 NOTE — TELEPHONE ENCOUNTER
Ms Spike Pelayo returning call to nurse [phone] or fax info you need to give her to [phone] since you keep playing phone tag

## 2019-10-18 NOTE — TELEPHONE ENCOUNTER
Called and spoke to patients daughter. Mrs. Feli Gordillo wanted to know why the mother is on 2 different blood pressure medications. Mrs. Feli Gordillo stated that her mom is not at all active and doesn't move around much and would like her mom to come off some of these medications. Mrs. Feli Gordillo is requesting a call to advise the changes and also a fax to be sent to the nursing facility so they can make the changes.

## 2019-11-12 ENCOUNTER — TELEPHONE (OUTPATIENT)
Dept: INTERNAL MEDICINE CLINIC | Age: 84
End: 2019-11-12

## 2019-11-12 NOTE — TELEPHONE ENCOUNTER
TRC to find out if she is transferring her care. Received a record request from [facility].

## 2019-11-12 NOTE — TELEPHONE ENCOUNTER
Pt is leaving the practice, she is wheelchair bound now. She wanted to thank Dr Fabricio Baez for all his care over the years, she liked him a lot.

## 2020-06-21 PROBLEM — I48.91 ATRIAL FIBRILLATION (HCC): Status: ACTIVE | Noted: 2020-06-21
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
## 2020-06-21 PROBLEM — S72.002A CLOSED LEFT HIP FRACTURE, INITIAL ENCOUNTER (HCC): Status: ACTIVE | Noted: 2020-06-21

## 2020-06-30 ENCOUNTER — PATIENT OUTREACH (OUTPATIENT)
Dept: CASE MANAGEMENT | Age: 85
End: 2020-06-30

## 2020-06-30 RX ORDER — LOSARTAN POTASSIUM 50 MG/1
50 TABLET ORAL DAILY
COMMUNITY

## 2020-06-30 NOTE — PROGRESS NOTES
Transition of Care Coordination/Hospital to Post Acute Facility:     Date/Time:  2020 11:26 AM    Patient was admitted to [facility] on 20 for treatment of fractured femur. Patient was discharged 20 to [facility] for continuation of care. Top Challenges reviewed    · Pressure Ulcer - patient has sacral wound that as of time of call was not staged. Patient was placed on pro stat and Jovian per facility protocol for protein malnutrition and wound healing  · Patient placed on pureed food and nectar thick liquids on admission to facility             Care Transitions Nurse(CTN) spoke with Deven Regalado to provide introduction to self and explanation of the Nurse Navigator Role. Verified name and  as patient identifiers. Discussed and reviewed  daily weights, diet restrictions, discharge summary, follow up appointments, medication reconciliation, wound care orders    ACP:   Does the patient have a current ACP (including DDNR):  yes  Does the post acute facility have a copy of the patients ACP:  yes    Medication(s):   New Medications at Discharge:   acetaminophen (TYLENOL) 325 mg tablet Take 2 Tabs by mouth every six (6) hours as needed for Fever. Changed Medications at Discharge: none  Discontinued Medications at Discharge:   furosemide (LASIX) 20 mg tablet   losartan (COZAAR) 50 mg tablet   potassium chloride SR (KLOR-CON 10) 10 mEq tablet          PCP/Specialist follow up: No future appointments. Opportunity to ask questions was provided. Contact information was provided for future reference or further questions. Patient discharged to a SNF [facility] facility, [facility]. (Medicare A & B. Chart forwarded to Henrietta Curling, RN [facility] Nurse for continued monitoring.

## 2020-07-02 ENCOUNTER — PATIENT OUTREACH (OUTPATIENT)
Dept: CASE MANAGEMENT | Age: 85
End: 2020-07-02

## 2020-07-06 NOTE — PROGRESS NOTES
Community Care Team Documentation for Patient in [facility]     Patient discharged from [facility]  to [facility] on 6/27/20. Hospital Discharge diagnosis:       1.  acute kidney injury, due to ATN from perioperative hypotension, now resolved   2. A. fib with RVR, rate controlled, continue carvedilol and diltiazem continue Eliquis   3. Left hip fracture status post repair, rehab on discharge, follow-up with Dr. Belia Peabody in the office in 4 weeks   4. CAD, status post stent in the past.  Continue aspirin statin beta-blocker   5.  hypertension, continue diltiazem and carvedilol. losartan and Lasix held on discharge due to recent DENISA, consider restarting at follow-up with PMD   6.  chronic anemia stable with some acute perioperative expected blood loss   7. Constipation   8.  aortic stenosis   9. GERD   10. CKD stage II   11. Hyperlipidemia   12. Encephalopathy toxic and metabolic in the setting of Ultram use and acute renal failure, now resolved     SNF Attending Provider:      Anticipated discharge date from SNF:  TBD    PCP : Jerrell Doan [address] [place] [address] Team rounds completed, updates provided by facility. Brief Summary of Care:    Patient receiving PT/OT. Confusion at times. BP low - fluids given. Unstageable sacral wound. Nonambulatory at this time. - Uses wheeled w/c for transfers. Min assist for grooming. Dispo:  Patient was living at [facility]. She will be returning there once therapy complete. RRAT:  Medium Risk            19       Total Score        19 Charlson Comorbidity Score (Age + Comorbid Conditions)        Criteria that do not apply:    Has Seen PCP in Last 6 Months (Yes=3, No=0)    . Living with Significant Other. Assisted Living. LTAC. SNF. or   Rehab    Patient Length of Stay (>5 days = 3)    IP Visits Last 12 Months (1-3=4, 4=9, >4=11)    Pt.  Coverage (Medicare=5 , Medicaid, or Self-Pay=4)        Active Ambulatory Problems     Diagnosis Date Noted    Coronary artery disease, status post PTCA of LAD in March 1995/EF 70%.  Asymptomatic carotid bruit 08/22/2011    Chest pain 08/22/2011    Mild aortic stenosis 08/22/2011    Osteoporosis     Closed bilateral fracture of pubic rami ([facility]) 05/15/2014    Impaired mobility and ADLs     Dysphagia     Osteoarthritis     Allergic rhinitis     History of peptic ulcer     Gastroesophageal reflux disease     Dyslipidemia     Chronic anemia     History of acute respiratory failure 05/16/2014    Requires supplemental oxygen     Urinary tract infection 05/15/2014    CKD (chronic kidney disease) stage 2, GFR 60-89 ml/min     Pulmonary collapse 03/24/2015    Palpitations, prob.  secondary to PVC's  09/01/2015    Abnormal nuclear stress test 02/29/2016    Advance directive discussed with patient 04/21/2016    Hyperlipidemia LDL goal <100 04/21/2016    Essential hypertension with goal blood pressure less than 140/90 04/21/2016    Mild single current episode of major depressive disorder ([address] 04/21/2016    PAF (paroxysmal atrial fibrillation) ([facility]) 02/28/2017    Chronic atrial fibrillation ([address] 04/18/2017    Gross hematuria 05/09/2017    Atrophic vaginitis 05/09/2017    History of gross hematuria 06/13/2017    Urethral caruncle 06/13/2017    WILSON (dyspnea on exertion) 45/18/9729    Diastolic dysfunction, left ventricle 01/10/2018    Confusion 08/24/2018    UTI (urinary tract infection) 08/24/2018    CVA (cerebral vascular accident) ([address] 08/24/2018    Essential hypertension 08/24/2018    Dehydration 08/24/2018    Vasogenic brain edema ([address] 08/25/2018    Status post CVA 10/30/2018    Atrial fibrillation ([address] 06/21/2020    Fall at nursing home 06/21/2020    Closed left hip fracture, initial encounter ([address] 06/21/2020     Resolved Ambulatory Problems     Diagnosis Date Noted    Epigastric discomfort     Palpitations, prob. secondary to PVC's 08/22/2011    Reflux esophagitis 08/17/2012    Hemoptysis 09/25/2012    GI bleed 08/20/2013    Anemia 08/20/2013    Atelectasis of left lung 01/09/2014    Hyperlipidemia LDL goal < 100 02/19/2014    Acute respiratory failure ([facility] 05/16/2014    Hypoxemia 38/80/7064    Metabolic encephalopathy 38/27/3231    Nausea & vomiting 05/16/2014    CKD (chronic kidney disease) stage 3, GFR 30-59 ml/min ([facility])      Past Medical History:   Diagnosis Date    Cardiac cath 12/15/2010    Cardiac nuclear imaging test, low to mod risk 02/22/2016    Carotid duplex 02/10/2014    Chronic kidney disease     Coronary artery disease     Heart problem     Histoplasmosis Oct 2012    Hypertension     Lung collapse Dec 2011    Positive PPD

## 2020-07-19 ENCOUNTER — PATIENT OUTREACH (OUTPATIENT)
Dept: CASE MANAGEMENT | Age: 85
End: 2020-07-19

## 2020-07-19 NOTE — PROGRESS NOTES
Community Care Team Documentation for Patient in [facility]     Patient discharged from [facility]  to [facility] on 6/27/20. Hospital Discharge diagnosis:       1.  acute kidney injury, due to ATN from perioperative hypotension, now resolved   2. A. fib with RVR, rate controlled, continue carvedilol and diltiazem continue Eliquis   3. Left hip fracture status post repair, rehab on discharge, follow-up with Dr. Melanie Valle in the office in 4 weeks   4. CAD, status post stent in the past.  Continue aspirin statin beta-blocker   5.  hypertension, continue diltiazem and carvedilol. losartan and Lasix held on discharge due to recent DENISA, consider restarting at follow-up with PMD   6.  chronic anemia stable with some acute perioperative expected blood loss   7. Constipation   8.  aortic stenosis   9. GERD   10. CKD stage II   11. Hyperlipidemia   12. Encephalopathy toxic and metabolic in the setting of Ultram use and acute renal failure, now resolved     SNF Attending Provider:      Anticipated discharge date from SNF:  TBD    PCP : Ryann Hairston, [address] [place] [address] Team rounds completed, updates provided by facility. Brief Summary of Care:    Patient receiving PT/OT. Confusion at times. Unstageable sacral wound. Nonambulatory at this time. - Uses wheeled w/c for transfers. Patient is 1:2 on all 3 therapies. Dispo:  Patient was living at [facility]. She will be returning there once therapy complete. RRAT:  Medium Risk            19       Total Score        19 Charlson Comorbidity Score (Age + Comorbid Conditions)        Criteria that do not apply:    Has Seen PCP in Last 6 Months (Yes=3, No=0)    . Living with Significant Other. Assisted Living. LTAC. SNF. or   Rehab    Patient Length of Stay (>5 days = 3)    IP Visits Last 12 Months (1-3=4, 4=9, >4=11)    Pt.  Coverage (Medicare=5 , Medicaid, or Self-Pay=4) Active Ambulatory Problems     Diagnosis Date Noted    Coronary artery disease, status post PTCA of LAD in March 1995/EF 70%.  Asymptomatic carotid bruit 08/22/2011    Chest pain 08/22/2011    Mild aortic stenosis 08/22/2011    Osteoporosis     Closed bilateral fracture of pubic rami ([facility]) 05/15/2014    Impaired mobility and ADLs     Dysphagia     Osteoarthritis     Allergic rhinitis     History of peptic ulcer     Gastroesophageal reflux disease     Dyslipidemia     Chronic anemia     History of acute respiratory failure 05/16/2014    Requires supplemental oxygen     Urinary tract infection 05/15/2014    CKD (chronic kidney disease) stage 2, GFR 60-89 ml/min     Pulmonary collapse 03/24/2015    Palpitations, prob. secondary to PVC's  09/01/2015    Abnormal nuclear stress test 02/29/2016    Advance directive discussed with patient 04/21/2016    Hyperlipidemia LDL goal <100 04/21/2016    Essential hypertension with goal blood pressure less than 140/90 04/21/2016    Mild single current episode of major depressive disorder ([address] 04/21/2016    PAF (paroxysmal atrial fibrillation) ([facility]) 02/28/2017    Chronic atrial fibrillation ([address] 04/18/2017    Gross hematuria 05/09/2017    Atrophic vaginitis 05/09/2017    History of gross hematuria 06/13/2017    Urethral caruncle 06/13/2017    WILSON (dyspnea on exertion) 40/72/0356    Diastolic dysfunction, left ventricle 01/10/2018    Confusion 08/24/2018    UTI (urinary tract infection) 08/24/2018    CVA (cerebral vascular accident) ([address] 08/24/2018    Essential hypertension 08/24/2018    Dehydration 08/24/2018    Vasogenic brain edema ([address] 08/25/2018    Status post CVA 10/30/2018    Atrial fibrillation ([address] 06/21/2020    Fall at nursing home 06/21/2020    Closed left hip fracture, initial encounter ([address] 06/21/2020     Resolved Ambulatory Problems     Diagnosis Date Noted    Epigastric discomfort     Palpitations, prob.  secondary to PVC's 08/22/2011    Reflux esophagitis 08/17/2012    Hemoptysis 09/25/2012    GI bleed 08/20/2013    Anemia 08/20/2013    Atelectasis of left lung 01/09/2014    Hyperlipidemia LDL goal < 100 02/19/2014    Acute respiratory failure ([facility] 05/16/2014    Hypoxemia 40/63/2903    Metabolic encephalopathy 48/60/5564    Nausea & vomiting 05/16/2014    CKD (chronic kidney disease) stage 3, GFR 30-59 ml/min ([facility])      Past Medical History:   Diagnosis Date    Cardiac cath 12/15/2010    Cardiac nuclear imaging test, low to mod risk 02/22/2016    Carotid duplex 02/10/2014    Chronic kidney disease     Coronary artery disease     Heart problem     Histoplasmosis Oct 2012    Hypertension     Lung collapse Dec 2011    Positive PPD

## 2020-07-23 ENCOUNTER — PATIENT OUTREACH (OUTPATIENT)
Dept: CASE MANAGEMENT | Age: 85
End: 2020-07-23

## 2020-07-31 ENCOUNTER — PATIENT OUTREACH (OUTPATIENT)
Dept: CASE MANAGEMENT | Age: 85
End: 2020-07-31

## 2020-07-31 NOTE — PROGRESS NOTES
Community Care Team Documentation for Patient in [facility]     Patient discharged from [facility]  to [facility] on 6/27/20. Hospital Discharge diagnosis:       1.  acute kidney injury, due to ATN from perioperative hypotension, now resolved   2. A. fib with RVR, rate controlled, continue carvedilol and diltiazem continue Eliquis   3. Left hip fracture status post repair, rehab on discharge, follow-up with Dr. Louis Yanes in the office in 4 weeks   4. CAD, status post stent in the past.  Continue aspirin statin beta-blocker   5.  hypertension, continue diltiazem and carvedilol. losartan and Lasix held on discharge due to recent DENISA, consider restarting at follow-up with PMD   6.  chronic anemia stable with some acute perioperative expected blood loss   7. Constipation   8.  aortic stenosis   9. GERD   10. CKD stage II   11. Hyperlipidemia   12. Encephalopathy toxic and metabolic in the setting of Ultram use and acute renal failure, now resolved     SNF Attending Provider:      Anticipated discharge date from SNF:  TBD, Pt may transition to LTC at this facility. PCP : Cipriano Rowley MD    [facility] Team rounds completed, updates provided by facility. Brief Summary of Care:    Patient receiving PT/OT. Confusion at times. Unstageable sacral wound. Ambulated 5 ft with mod assist.   - Uses wheeled w/c for transfers. Patient is 1:2 on all 3 therapies. Dispo:  Patient and family are now considering transition to LTC at [facility] RRAT:  Medium Risk            19       Total Score        19 Charlson Comorbidity Score (Age + Comorbid Conditions)        Criteria that do not apply:    Has Seen PCP in Last 6 Months (Yes=3, No=0)    . Living with Significant Other. Assisted Living. LTAC. SNF. or   Rehab    Patient Length of Stay (>5 days = 3)    IP Visits Last 12 Months (1-3=4, 4=9, >4=11)    Pt.  Coverage (Medicare=5 , Medicaid, or Self-Pay=4)        Active Ambulatory Problems     Diagnosis Date Noted    Coronary artery disease, status post PTCA of LAD in March 1995/EF 70%.  Asymptomatic carotid bruit 08/22/2011    Chest pain 08/22/2011    Mild aortic stenosis 08/22/2011    Osteoporosis     Closed bilateral fracture of pubic rami ([facility] 05/15/2014    Impaired mobility and ADLs     Dysphagia     Osteoarthritis     Allergic rhinitis     History of peptic ulcer     Gastroesophageal reflux disease     Dyslipidemia     Chronic anemia     History of acute respiratory failure 05/16/2014    Requires supplemental oxygen     Urinary tract infection 05/15/2014    CKD (chronic kidney disease) stage 2, GFR 60-89 ml/min     Pulmonary collapse 03/24/2015    Palpitations, prob.  secondary to PVC's  09/01/2015    Abnormal nuclear stress test 02/29/2016    Advance directive discussed with patient 04/21/2016    Hyperlipidemia LDL goal <100 04/21/2016    Essential hypertension with goal blood pressure less than 140/90 04/21/2016    Mild single current episode of major depressive disorder ([address] 04/21/2016    PAF (paroxysmal atrial fibrillation) ([facility] 02/28/2017    Chronic atrial fibrillation ([address] 04/18/2017    Gross hematuria 05/09/2017    Atrophic vaginitis 05/09/2017    History of gross hematuria 06/13/2017    Urethral caruncle 06/13/2017    WILSON (dyspnea on exertion) 32/18/7041    Diastolic dysfunction, left ventricle 01/10/2018    Confusion 08/24/2018    UTI (urinary tract infection) 08/24/2018    CVA (cerebral vascular accident) ([address] 08/24/2018    Essential hypertension 08/24/2018    Dehydration 08/24/2018    Vasogenic brain edema ([address] 08/25/2018    Status post CVA 10/30/2018    Atrial fibrillation ([address] 06/21/2020    Fall at nursing home 06/21/2020    Closed left hip fracture, initial encounter ([address] 06/21/2020     Resolved Ambulatory Problems     Diagnosis Date Noted    Epigastric discomfort     Palpitations, prob.  secondary to PVC's 08/22/2011    Reflux esophagitis 08/17/2012    Hemoptysis 09/25/2012    GI bleed 08/20/2013    Anemia 08/20/2013    Atelectasis of left lung 01/09/2014    Hyperlipidemia LDL goal < 100 02/19/2014    Acute respiratory failure ([facility] 05/16/2014    Hypoxemia 06/07/5716    Metabolic encephalopathy 77/62/7027    Nausea & vomiting 05/16/2014    CKD (chronic kidney disease) stage 3, GFR 30-59 ml/min ([facility])      Past Medical History:   Diagnosis Date    Cardiac cath 12/15/2010    Cardiac nuclear imaging test, low to mod risk 02/22/2016    Carotid duplex 02/10/2014    Chronic kidney disease     Coronary artery disease     Heart problem     Histoplasmosis Oct 2012    Hypertension     Lung collapse Dec 2011    Positive PPD

## 2020-08-06 ENCOUNTER — PATIENT OUTREACH (OUTPATIENT)
Dept: CASE MANAGEMENT | Age: 85
End: 2020-08-06

## 2020-08-06 NOTE — PROGRESS NOTES
Community Care Team Documentation for Patient in [facility]     Patient discharged from [facility]  to [facility]., on 6/27/20. Hospital Discharge diagnosis:       1.  acute kidney injury, due to ATN from perioperative hypotension, now resolved   2. A. fib with RVR, rate controlled, continue carvedilol and diltiazem continue Eliquis   3. Left hip fracture status post repair, rehab on discharge, follow-up with Dr. Stewart Qureshi in the office in 4 weeks   4. CAD, status post stent in the past.  Continue aspirin statin beta-blocker   5.  hypertension, continue diltiazem and carvedilol. losartan and Lasix held on discharge due to recent DEINSA, consider restarting at follow-up with PMD   6.  chronic anemia stable with some acute perioperative expected blood loss   7. Constipation   8.  aortic stenosis   9. GERD   10. CKD stage II   11. Hyperlipidemia   12. Encephalopathy toxic and metabolic in the setting of Ultram use and acute renal failure, now resolved     SNF Attending Provider:      Anticipated discharge date from SNF: Pt will transition to LTC at this facility. PCP : Thomas Graves MD    [facility] Team rounds completed, updates provided by facility. Brief Summary of Care:    Patient receiving PT/OT. Confusion at times. Unstageable sacral wound. Ambulated 5 ft with mod assist.   - Uses wheeled w/c for transfers. Patient is still 24 hr care and will not be able to return to [facility]. Dispo:  Patient will be staying LTC at [facility]. Episode Resolved. RRAT:  Medium Risk            19       Total Score        19 Charlson Comorbidity Score (Age + Comorbid Conditions)        Criteria that do not apply:    Has Seen PCP in Last 6 Months (Yes=3, No=0)    . Living with Significant Other. Assisted Living. LTAC. SNF. or   Rehab    Patient Length of Stay (>5 days = 3)    IP Visits Last 12 Months (1-3=4, 4=9, >4=11)    Pt. Coverage (Medicare=5 , Medicaid, or Self-Pay=4)        Active Ambulatory Problems     Diagnosis Date Noted    Coronary artery disease, status post PTCA of LAD in March 1995/EF 70%.  Asymptomatic carotid bruit 08/22/2011    Chest pain 08/22/2011    Mild aortic stenosis 08/22/2011    Osteoporosis     Closed bilateral fracture of pubic rami ([facility] 05/15/2014    Impaired mobility and ADLs     Dysphagia     Osteoarthritis     Allergic rhinitis     History of peptic ulcer     Gastroesophageal reflux disease     Dyslipidemia     Chronic anemia     History of acute respiratory failure 05/16/2014    Requires supplemental oxygen     Urinary tract infection 05/15/2014    CKD (chronic kidney disease) stage 2, GFR 60-89 ml/min     Pulmonary collapse 03/24/2015    Palpitations, prob.  secondary to PVC's  09/01/2015    Abnormal nuclear stress test 02/29/2016    Advance directive discussed with patient 04/21/2016    Hyperlipidemia LDL goal <100 04/21/2016    Essential hypertension with goal blood pressure less than 140/90 04/21/2016    Mild single current episode of major depressive disorder ([address] 04/21/2016    PAF (paroxysmal atrial fibrillation) ([facility] 02/28/2017    Chronic atrial fibrillation ([address] 04/18/2017    Gross hematuria 05/09/2017    Atrophic vaginitis 05/09/2017    History of gross hematuria 06/13/2017    Urethral caruncle 06/13/2017    WILSON (dyspnea on exertion) 06/58/3482    Diastolic dysfunction, left ventricle 01/10/2018    Confusion 08/24/2018    UTI (urinary tract infection) 08/24/2018    CVA (cerebral vascular accident) ([address] 08/24/2018    Essential hypertension 08/24/2018    Dehydration 08/24/2018    Vasogenic brain edema ([address] 08/25/2018    Status post CVA 10/30/2018    Atrial fibrillation ([address] 06/21/2020    Fall at nursing home 06/21/2020    Closed left hip fracture, initial encounter ([address] 06/21/2020     Resolved Ambulatory Problems     Diagnosis Date Noted    Epigastric discomfort     Palpitations, prob.  secondary to PVC's 08/22/2011    Reflux esophagitis 08/17/2012    Hemoptysis 09/25/2012    GI bleed 08/20/2013    Anemia 08/20/2013    Atelectasis of left lung 01/09/2014    Hyperlipidemia LDL goal < 100 02/19/2014    Acute respiratory failure ([facility] 05/16/2014    Hypoxemia 35/81/3002    Metabolic encephalopathy 21/77/6364    Nausea & vomiting 05/16/2014    CKD (chronic kidney disease) stage 3, GFR 30-59 ml/min ([facility])      Past Medical History:   Diagnosis Date    Cardiac cath 12/15/2010    Cardiac nuclear imaging test, low to mod risk 02/22/2016    Carotid duplex 02/10/2014    Chronic kidney disease     Coronary artery disease     Heart problem     Histoplasmosis Oct 2012    Hypertension     Lung collapse Dec 2011    Positive PPD
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
## 2021-08-03 PROBLEM — I10 ESSENTIAL HYPERTENSION: Status: RESOLVED | Noted: 2018-08-24 | Resolved: 2021-08-03

## 2022-03-18 PROBLEM — I51.9 DIASTOLIC DYSFUNCTION, LEFT VENTRICLE: Status: ACTIVE | Noted: 2018-01-10

## 2022-03-18 PROBLEM — N95.2 ATROPHIC VAGINITIS: Status: ACTIVE | Noted: 2017-05-09

## 2022-03-18 PROBLEM — Z86.73 STATUS POST CVA: Status: ACTIVE | Noted: 2018-10-30

## 2022-03-19 PROBLEM — N36.2 URETHRAL CARUNCLE: Status: ACTIVE | Noted: 2017-06-13
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
## 2022-03-19 PROBLEM — R06.09 DOE (DYSPNEA ON EXERTION): Status: ACTIVE | Noted: 2018-01-10

## 2022-03-19 PROBLEM — N39.0 UTI (URINARY TRACT INFECTION): Status: ACTIVE | Noted: 2018-08-24

## 2022-03-19 PROBLEM — S72.002A CLOSED LEFT HIP FRACTURE, INITIAL ENCOUNTER (HCC): Status: ACTIVE | Noted: 2020-06-21

## 2022-03-19 PROBLEM — E86.0 DEHYDRATION: Status: ACTIVE | Noted: 2018-08-24

## 2022-03-19 PROBLEM — G93.6 VASOGENIC BRAIN EDEMA (HCC): Status: ACTIVE | Noted: 2018-08-25

## 2022-03-19 PROBLEM — I48.91 ATRIAL FIBRILLATION (HCC): Status: ACTIVE | Noted: 2020-06-21

## 2022-03-19 PROBLEM — I48.20 CHRONIC ATRIAL FIBRILLATION (HCC): Status: ACTIVE | Noted: 2017-04-18

## 2022-03-19 PROBLEM — R31.0 GROSS HEMATURIA: Status: ACTIVE | Noted: 2017-05-09

## 2022-03-19 PROBLEM — R41.0 CONFUSION: Status: ACTIVE | Noted: 2018-08-24

## 2022-03-19 PROBLEM — I63.9 CVA (CEREBRAL VASCULAR ACCIDENT) (HCC): Status: ACTIVE | Noted: 2018-08-24
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
## 2022-03-19 PROBLEM — I48.0 PAF (PAROXYSMAL ATRIAL FIBRILLATION) (HCC): Status: ACTIVE | Noted: 2017-02-28

## 2022-03-20 PROBLEM — Z87.898 HISTORY OF GROSS HEMATURIA: Status: ACTIVE | Noted: 2017-06-13

## 2023-01-31 RX ORDER — CARVEDILOL 12.5 MG/1
TABLET ORAL
COMMUNITY
Start: 2017-10-31

## 2023-01-31 RX ORDER — LOSARTAN POTASSIUM 50 MG/1
50 TABLET ORAL DAILY
COMMUNITY

## 2023-01-31 RX ORDER — KETOTIFEN FUMARATE 0.35 MG/ML
1 SOLUTION/ DROPS OPHTHALMIC 2 TIMES DAILY
COMMUNITY

## 2023-01-31 RX ORDER — LACTOBACILLUS RHAMNOSUS GG 10B CELL
2 CAPSULE ORAL DAILY
COMMUNITY

## 2023-01-31 RX ORDER — AMOXICILLIN 250 MG
1 CAPSULE ORAL 2 TIMES DAILY
COMMUNITY

## 2023-01-31 RX ORDER — POLYETHYLENE GLYCOL 3350 17 G/17G
17 POWDER, FOR SOLUTION ORAL DAILY
COMMUNITY

## 2023-01-31 RX ORDER — PROMETHAZINE HYDROCHLORIDE 25 MG/1
25 TABLET ORAL EVERY 6 HOURS PRN
COMMUNITY
Start: 2018-07-17

## 2023-01-31 RX ORDER — ASCORBIC ACID 250 MG
250 TABLET ORAL 2 TIMES DAILY WITH MEALS
COMMUNITY
Start: 2014-06-17

## 2023-01-31 RX ORDER — AMMONIUM LACTATE 12 G/100G
LOTION TOPICAL
COMMUNITY
Start: 2019-02-27

## 2023-01-31 RX ORDER — ACETAMINOPHEN 325 MG/1
650 TABLET ORAL EVERY 6 HOURS PRN
COMMUNITY
Start: 2020-06-27

## 2023-01-31 RX ORDER — OMEPRAZOLE 20 MG/1
20 CAPSULE, DELAYED RELEASE ORAL DAILY PRN
COMMUNITY

## 2023-01-31 RX ORDER — TOLTERODINE TARTRATE 2 MG/1
2 TABLET, EXTENDED RELEASE ORAL 2 TIMES DAILY
COMMUNITY
Start: 2019-02-27

## 2023-01-31 RX ORDER — ASPIRIN 325 MG
325 TABLET ORAL DAILY
COMMUNITY
Start: 2018-09-01

## 2023-01-31 RX ORDER — LOVASTATIN 20 MG/1
20 TABLET ORAL DAILY
COMMUNITY
Start: 2017-08-31
